# Patient Record
Sex: MALE | Race: WHITE | NOT HISPANIC OR LATINO | Employment: FULL TIME | URBAN - METROPOLITAN AREA
[De-identification: names, ages, dates, MRNs, and addresses within clinical notes are randomized per-mention and may not be internally consistent; named-entity substitution may affect disease eponyms.]

---

## 2017-03-14 ENCOUNTER — OFFICE VISIT - HEALTHEAST (OUTPATIENT)
Dept: FAMILY MEDICINE | Facility: CLINIC | Age: 32
End: 2017-03-14

## 2017-03-14 DIAGNOSIS — J40 BRONCHITIS: ICD-10-CM

## 2017-03-29 ENCOUNTER — RECORDS - HEALTHEAST (OUTPATIENT)
Dept: ADMINISTRATIVE | Facility: OTHER | Age: 32
End: 2017-03-29

## 2017-05-12 ENCOUNTER — RECORDS - HEALTHEAST (OUTPATIENT)
Dept: GENERAL RADIOLOGY | Facility: CLINIC | Age: 32
End: 2017-05-12

## 2017-05-12 ENCOUNTER — OFFICE VISIT - HEALTHEAST (OUTPATIENT)
Dept: FAMILY MEDICINE | Facility: CLINIC | Age: 32
End: 2017-05-12

## 2017-05-12 DIAGNOSIS — M54.12 RADICULOPATHY, CERVICAL REGION: ICD-10-CM

## 2017-05-12 DIAGNOSIS — M54.12 BRACHIAL NEURITIS OR RADICULITIS: ICD-10-CM

## 2017-05-15 ENCOUNTER — COMMUNICATION - HEALTHEAST (OUTPATIENT)
Dept: FAMILY MEDICINE | Facility: CLINIC | Age: 32
End: 2017-05-15

## 2017-05-15 DIAGNOSIS — M54.9 BACK PAIN: ICD-10-CM

## 2017-05-15 DIAGNOSIS — M47.816 OSTEOARTHRITIS OF LUMBAR SPINE: ICD-10-CM

## 2017-05-15 DIAGNOSIS — M47.812 OSTEOARTHRITIS CERVICAL SPINE: ICD-10-CM

## 2017-05-15 DIAGNOSIS — M54.2 NECK PAIN: ICD-10-CM

## 2017-05-18 ENCOUNTER — OFFICE VISIT - HEALTHEAST (OUTPATIENT)
Dept: FAMILY MEDICINE | Facility: CLINIC | Age: 32
End: 2017-05-18

## 2017-05-18 DIAGNOSIS — M54.12 BRACHIAL NEURITIS OR RADICULITIS: ICD-10-CM

## 2017-05-18 DIAGNOSIS — M54.50 LUMBAGO: ICD-10-CM

## 2017-05-18 DIAGNOSIS — M54.16 LUMBAR RADICULOPATHY: ICD-10-CM

## 2017-05-18 ASSESSMENT — MIFFLIN-ST. JEOR: SCORE: 2280.24

## 2017-05-19 ENCOUNTER — HOSPITAL ENCOUNTER (OUTPATIENT)
Dept: PHYSICAL MEDICINE AND REHAB | Facility: CLINIC | Age: 32
Discharge: HOME | End: 2017-05-19
Attending: PHYSICIAN ASSISTANT

## 2017-05-19 ENCOUNTER — HOSPITAL ENCOUNTER (OUTPATIENT)
Dept: PHYSICAL MEDICINE AND REHAB | Facility: CLINIC | Age: 32
Discharge: HOME | End: 2017-05-19
Attending: FAMILY MEDICINE

## 2017-05-19 DIAGNOSIS — M47.812 FACET ARTHRITIS OF CERVICAL REGION: ICD-10-CM

## 2017-05-19 DIAGNOSIS — M79.18 MYOFASCIAL PAIN: ICD-10-CM

## 2017-05-19 DIAGNOSIS — M51.26 LUMBAR DISC HERNIATION: ICD-10-CM

## 2017-05-19 DIAGNOSIS — M54.50 LUMBALGIA: ICD-10-CM

## 2017-05-19 DIAGNOSIS — M47.812 CERVICAL FACET JOINT SYNDROME: ICD-10-CM

## 2017-05-19 DIAGNOSIS — M99.01 CERVICAL SEGMENT DYSFUNCTION: ICD-10-CM

## 2017-05-19 DIAGNOSIS — M54.12 CERVICAL RADICULITIS: ICD-10-CM

## 2017-05-19 DIAGNOSIS — M99.02 THORACIC REGION SOMATIC DYSFUNCTION: ICD-10-CM

## 2017-05-19 DIAGNOSIS — M54.2 CERVICALGIA: ICD-10-CM

## 2017-05-19 DIAGNOSIS — G47.9 SLEEP DISTURBANCE: ICD-10-CM

## 2017-05-19 DIAGNOSIS — M54.16 LUMBAR RADICULITIS: ICD-10-CM

## 2017-05-19 DIAGNOSIS — M99.00 SEGMENTAL AND SOMATIC DYSFUNCTION OF HEAD REGION: ICD-10-CM

## 2017-05-19 ASSESSMENT — MIFFLIN-ST. JEOR: SCORE: 2272.53

## 2017-05-22 ENCOUNTER — RECORDS - HEALTHEAST (OUTPATIENT)
Dept: ADMINISTRATIVE | Facility: OTHER | Age: 32
End: 2017-05-22

## 2017-05-23 ENCOUNTER — HOSPITAL ENCOUNTER (OUTPATIENT)
Dept: MRI IMAGING | Facility: CLINIC | Age: 32
Discharge: HOME | End: 2017-05-23
Attending: FAMILY MEDICINE

## 2017-05-23 ENCOUNTER — HOSPITAL ENCOUNTER (OUTPATIENT)
Dept: PHYSICAL MEDICINE AND REHAB | Facility: CLINIC | Age: 32
Discharge: HOME | End: 2017-05-23
Attending: PHYSICIAN ASSISTANT

## 2017-05-23 DIAGNOSIS — M47.812 CERVICAL FACET JOINT SYNDROME: ICD-10-CM

## 2017-05-23 DIAGNOSIS — M99.01 CERVICAL SEGMENT DYSFUNCTION: ICD-10-CM

## 2017-05-23 DIAGNOSIS — M54.12 BRACHIAL NEURITIS OR RADICULITIS: ICD-10-CM

## 2017-05-23 DIAGNOSIS — M54.16 LUMBAR RADICULOPATHY: ICD-10-CM

## 2017-05-23 DIAGNOSIS — M99.02 THORACIC REGION SOMATIC DYSFUNCTION: ICD-10-CM

## 2017-05-23 DIAGNOSIS — M47.812 FACET ARTHRITIS OF CERVICAL REGION: ICD-10-CM

## 2017-05-23 DIAGNOSIS — M99.00 SEGMENTAL AND SOMATIC DYSFUNCTION OF HEAD REGION: ICD-10-CM

## 2017-05-23 DIAGNOSIS — M54.2 CERVICALGIA: ICD-10-CM

## 2017-05-24 ENCOUNTER — OFFICE VISIT - HEALTHEAST (OUTPATIENT)
Dept: PHYSICAL THERAPY | Facility: CLINIC | Age: 32
End: 2017-05-24

## 2017-05-24 DIAGNOSIS — R29.898 DECREASED ROM OF NECK: ICD-10-CM

## 2017-05-24 DIAGNOSIS — M54.50 CHRONIC BILATERAL LOW BACK PAIN WITHOUT SCIATICA: ICD-10-CM

## 2017-05-24 DIAGNOSIS — M54.12 CERVICAL RADICULITIS: ICD-10-CM

## 2017-05-24 DIAGNOSIS — G89.29 CHRONIC BILATERAL LOW BACK PAIN WITHOUT SCIATICA: ICD-10-CM

## 2017-05-24 DIAGNOSIS — M54.2 NECK PAIN: ICD-10-CM

## 2017-05-24 DIAGNOSIS — M54.16 LUMBAR RADICULOPATHY: ICD-10-CM

## 2017-05-25 ENCOUNTER — HOSPITAL ENCOUNTER (OUTPATIENT)
Dept: PHYSICAL MEDICINE AND REHAB | Facility: CLINIC | Age: 32
Discharge: HOME | End: 2017-05-25
Attending: PHYSICIAN ASSISTANT

## 2017-05-25 DIAGNOSIS — M47.812 CERVICAL FACET JOINT SYNDROME: ICD-10-CM

## 2017-05-25 DIAGNOSIS — M99.02 THORACIC REGION SOMATIC DYSFUNCTION: ICD-10-CM

## 2017-05-25 DIAGNOSIS — M99.01 CERVICAL SEGMENT DYSFUNCTION: ICD-10-CM

## 2017-05-25 DIAGNOSIS — M54.2 CERVICALGIA: ICD-10-CM

## 2017-05-25 DIAGNOSIS — M99.00 SEGMENTAL AND SOMATIC DYSFUNCTION OF HEAD REGION: ICD-10-CM

## 2017-05-30 ENCOUNTER — RECORDS - HEALTHEAST (OUTPATIENT)
Dept: ADMINISTRATIVE | Facility: OTHER | Age: 32
End: 2017-05-30

## 2017-06-02 ENCOUNTER — HOSPITAL ENCOUNTER (OUTPATIENT)
Dept: PHYSICAL MEDICINE AND REHAB | Facility: CLINIC | Age: 32
Discharge: HOME | End: 2017-06-02
Attending: PHYSICIAN ASSISTANT

## 2017-06-02 DIAGNOSIS — M99.01 CERVICAL SEGMENT DYSFUNCTION: ICD-10-CM

## 2017-06-02 DIAGNOSIS — M47.812 CERVICAL FACET JOINT SYNDROME: ICD-10-CM

## 2017-06-02 DIAGNOSIS — M54.2 CERVICALGIA: ICD-10-CM

## 2017-06-02 DIAGNOSIS — M99.00 SEGMENTAL AND SOMATIC DYSFUNCTION OF HEAD REGION: ICD-10-CM

## 2017-06-02 DIAGNOSIS — M99.02 THORACIC REGION SOMATIC DYSFUNCTION: ICD-10-CM

## 2017-06-07 ENCOUNTER — HOSPITAL ENCOUNTER (OUTPATIENT)
Dept: PHYSICAL MEDICINE AND REHAB | Facility: CLINIC | Age: 32
Discharge: HOME | End: 2017-06-07
Attending: PHYSICIAN ASSISTANT

## 2017-06-07 DIAGNOSIS — M79.18 MYOFASCIAL PAIN: ICD-10-CM

## 2017-06-07 DIAGNOSIS — M54.12 CERVICAL RADICULITIS: ICD-10-CM

## 2017-06-07 DIAGNOSIS — M54.50 LUMBALGIA: ICD-10-CM

## 2017-06-07 DIAGNOSIS — G47.9 SLEEP DISTURBANCE: ICD-10-CM

## 2017-06-07 DIAGNOSIS — M54.2 CERVICALGIA: ICD-10-CM

## 2017-06-07 DIAGNOSIS — M54.16 LUMBAR RADICULITIS: ICD-10-CM

## 2017-06-07 DIAGNOSIS — M47.812 CERVICAL FACET JOINT SYNDROME: ICD-10-CM

## 2017-06-07 DIAGNOSIS — M51.26 LUMBAR DISC HERNIATION: ICD-10-CM

## 2017-06-12 ENCOUNTER — OFFICE VISIT - HEALTHEAST (OUTPATIENT)
Dept: PHYSICAL THERAPY | Facility: CLINIC | Age: 32
End: 2017-06-12

## 2017-06-12 DIAGNOSIS — M54.16 LUMBAR RADICULOPATHY: ICD-10-CM

## 2017-06-12 DIAGNOSIS — M54.12 CERVICAL RADICULITIS: ICD-10-CM

## 2017-06-12 DIAGNOSIS — G89.29 CHRONIC BILATERAL LOW BACK PAIN WITHOUT SCIATICA: ICD-10-CM

## 2017-06-12 DIAGNOSIS — R29.898 DECREASED ROM OF NECK: ICD-10-CM

## 2017-06-12 DIAGNOSIS — M54.2 NECK PAIN: ICD-10-CM

## 2017-06-12 DIAGNOSIS — M54.50 CHRONIC BILATERAL LOW BACK PAIN WITHOUT SCIATICA: ICD-10-CM

## 2017-06-15 ENCOUNTER — OFFICE VISIT - HEALTHEAST (OUTPATIENT)
Dept: PHYSICAL THERAPY | Facility: CLINIC | Age: 32
End: 2017-06-15

## 2017-06-15 DIAGNOSIS — M54.16 LUMBAR RADICULOPATHY: ICD-10-CM

## 2017-06-15 DIAGNOSIS — R29.898 DECREASED ROM OF NECK: ICD-10-CM

## 2017-06-15 DIAGNOSIS — M54.12 CERVICAL RADICULITIS: ICD-10-CM

## 2017-06-15 DIAGNOSIS — G89.29 CHRONIC BILATERAL LOW BACK PAIN WITHOUT SCIATICA: ICD-10-CM

## 2017-06-15 DIAGNOSIS — M54.2 NECK PAIN: ICD-10-CM

## 2017-06-15 DIAGNOSIS — M54.50 CHRONIC BILATERAL LOW BACK PAIN WITHOUT SCIATICA: ICD-10-CM

## 2017-06-19 ENCOUNTER — HOSPITAL ENCOUNTER (OUTPATIENT)
Dept: PHYSICAL MEDICINE AND REHAB | Facility: CLINIC | Age: 32
Discharge: HOME | End: 2017-06-19
Attending: PHYSICIAN ASSISTANT

## 2017-06-19 ENCOUNTER — OFFICE VISIT - HEALTHEAST (OUTPATIENT)
Dept: PHYSICAL THERAPY | Facility: CLINIC | Age: 32
End: 2017-06-19

## 2017-06-19 DIAGNOSIS — M99.04 SACROILIAC JOINT SOMATIC DYSFUNCTION: ICD-10-CM

## 2017-06-19 DIAGNOSIS — R29.898 DECREASED ROM OF NECK: ICD-10-CM

## 2017-06-19 DIAGNOSIS — M54.50 CHRONIC BILATERAL LOW BACK PAIN WITHOUT SCIATICA: ICD-10-CM

## 2017-06-19 DIAGNOSIS — M99.00 SEGMENTAL AND SOMATIC DYSFUNCTION OF HEAD REGION: ICD-10-CM

## 2017-06-19 DIAGNOSIS — M54.2 CERVICALGIA: ICD-10-CM

## 2017-06-19 DIAGNOSIS — M47.812 CERVICAL FACET JOINT SYNDROME: ICD-10-CM

## 2017-06-19 DIAGNOSIS — M99.04 SACRAL REGION SOMATIC DYSFUNCTION: ICD-10-CM

## 2017-06-19 DIAGNOSIS — G89.29 CHRONIC BILATERAL LOW BACK PAIN WITHOUT SCIATICA: ICD-10-CM

## 2017-06-19 DIAGNOSIS — M54.16 LUMBAR RADICULOPATHY: ICD-10-CM

## 2017-06-19 DIAGNOSIS — M99.01 CERVICAL SEGMENT DYSFUNCTION: ICD-10-CM

## 2017-06-19 DIAGNOSIS — M54.2 NECK PAIN: ICD-10-CM

## 2017-06-19 DIAGNOSIS — M99.03 SEGMENTAL DYSFUNCTION OF LUMBAR REGION: ICD-10-CM

## 2017-06-19 DIAGNOSIS — M99.02 THORACIC REGION SOMATIC DYSFUNCTION: ICD-10-CM

## 2017-06-19 DIAGNOSIS — M54.12 CERVICAL RADICULITIS: ICD-10-CM

## 2017-06-20 ENCOUNTER — HOSPITAL ENCOUNTER (OUTPATIENT)
Dept: PHYSICAL MEDICINE AND REHAB | Facility: CLINIC | Age: 32
Discharge: HOME | End: 2017-06-20
Attending: PHYSICIAN ASSISTANT

## 2017-06-20 DIAGNOSIS — G47.9 SLEEP DISTURBANCE: ICD-10-CM

## 2017-06-20 DIAGNOSIS — M51.26 LUMBAR DISC HERNIATION: ICD-10-CM

## 2017-06-20 DIAGNOSIS — M54.16 LUMBAR RADICULITIS: ICD-10-CM

## 2017-06-20 DIAGNOSIS — M54.50 LUMBALGIA: ICD-10-CM

## 2017-06-21 ENCOUNTER — OFFICE VISIT - HEALTHEAST (OUTPATIENT)
Dept: PHYSICAL THERAPY | Facility: CLINIC | Age: 32
End: 2017-06-21

## 2017-06-21 ENCOUNTER — HOSPITAL ENCOUNTER (OUTPATIENT)
Dept: PHYSICAL MEDICINE AND REHAB | Facility: CLINIC | Age: 32
Discharge: HOME | End: 2017-06-21
Attending: PHYSICIAN ASSISTANT

## 2017-06-21 DIAGNOSIS — G89.29 CHRONIC BILATERAL LOW BACK PAIN WITHOUT SCIATICA: ICD-10-CM

## 2017-06-21 DIAGNOSIS — M54.2 NECK PAIN: ICD-10-CM

## 2017-06-21 DIAGNOSIS — M54.12 CERVICAL RADICULITIS: ICD-10-CM

## 2017-06-21 DIAGNOSIS — M54.16 LUMBAR RADICULOPATHY: ICD-10-CM

## 2017-06-21 DIAGNOSIS — R29.898 DECREASED ROM OF NECK: ICD-10-CM

## 2017-06-21 DIAGNOSIS — M54.50 CHRONIC BILATERAL LOW BACK PAIN WITHOUT SCIATICA: ICD-10-CM

## 2017-06-26 ENCOUNTER — OFFICE VISIT - HEALTHEAST (OUTPATIENT)
Dept: PHYSICAL THERAPY | Facility: CLINIC | Age: 32
End: 2017-06-26

## 2017-06-26 ENCOUNTER — HOSPITAL ENCOUNTER (OUTPATIENT)
Dept: PHYSICAL MEDICINE AND REHAB | Facility: CLINIC | Age: 32
Discharge: HOME | End: 2017-06-26
Attending: PHYSICIAN ASSISTANT

## 2017-06-26 DIAGNOSIS — M79.18 MYOFASCIAL PAIN: ICD-10-CM

## 2017-06-26 DIAGNOSIS — M99.03 SEGMENTAL DYSFUNCTION OF LUMBAR REGION: ICD-10-CM

## 2017-06-26 DIAGNOSIS — M47.812 FACET ARTHRITIS OF CERVICAL REGION: ICD-10-CM

## 2017-06-26 DIAGNOSIS — M99.00 SEGMENTAL AND SOMATIC DYSFUNCTION OF HEAD REGION: ICD-10-CM

## 2017-06-26 DIAGNOSIS — M99.01 CERVICAL SEGMENT DYSFUNCTION: ICD-10-CM

## 2017-06-26 DIAGNOSIS — M54.2 NECK PAIN: ICD-10-CM

## 2017-06-26 DIAGNOSIS — M54.50 CHRONIC BILATERAL LOW BACK PAIN WITHOUT SCIATICA: ICD-10-CM

## 2017-06-26 DIAGNOSIS — M54.12 CERVICAL RADICULITIS: ICD-10-CM

## 2017-06-26 DIAGNOSIS — G89.29 CHRONIC BILATERAL LOW BACK PAIN WITHOUT SCIATICA: ICD-10-CM

## 2017-06-26 DIAGNOSIS — M99.02 THORACIC REGION SOMATIC DYSFUNCTION: ICD-10-CM

## 2017-06-26 DIAGNOSIS — M99.04 SACROILIAC JOINT SOMATIC DYSFUNCTION: ICD-10-CM

## 2017-06-26 DIAGNOSIS — M99.04 SACRAL REGION SOMATIC DYSFUNCTION: ICD-10-CM

## 2017-06-26 DIAGNOSIS — M54.16 LUMBAR RADICULOPATHY: ICD-10-CM

## 2017-06-26 DIAGNOSIS — R29.898 DECREASED ROM OF NECK: ICD-10-CM

## 2017-06-26 ASSESSMENT — MIFFLIN-ST. JEOR: SCORE: 2266.63

## 2017-06-28 ENCOUNTER — RECORDS - HEALTHEAST (OUTPATIENT)
Dept: ADMINISTRATIVE | Facility: OTHER | Age: 32
End: 2017-06-28

## 2017-06-29 ENCOUNTER — HOSPITAL ENCOUNTER (OUTPATIENT)
Dept: PHYSICAL MEDICINE AND REHAB | Facility: CLINIC | Age: 32
Discharge: HOME | End: 2017-06-29
Attending: PHYSICIAN ASSISTANT

## 2017-06-29 ENCOUNTER — OFFICE VISIT - HEALTHEAST (OUTPATIENT)
Dept: PHYSICAL THERAPY | Facility: CLINIC | Age: 32
End: 2017-06-29

## 2017-06-29 DIAGNOSIS — M54.16 LUMBAR RADICULOPATHY: ICD-10-CM

## 2017-06-29 DIAGNOSIS — M54.12 CERVICAL RADICULITIS: ICD-10-CM

## 2017-06-29 DIAGNOSIS — G89.29 CHRONIC BILATERAL LOW BACK PAIN WITHOUT SCIATICA: ICD-10-CM

## 2017-06-29 DIAGNOSIS — M99.02 THORACIC REGION SOMATIC DYSFUNCTION: ICD-10-CM

## 2017-06-29 DIAGNOSIS — M54.2 NECK PAIN: ICD-10-CM

## 2017-06-29 DIAGNOSIS — M54.50 CHRONIC BILATERAL LOW BACK PAIN WITHOUT SCIATICA: ICD-10-CM

## 2017-06-29 DIAGNOSIS — M99.04 SACRAL REGION SOMATIC DYSFUNCTION: ICD-10-CM

## 2017-06-29 DIAGNOSIS — M99.00 SEGMENTAL AND SOMATIC DYSFUNCTION OF HEAD REGION: ICD-10-CM

## 2017-06-29 DIAGNOSIS — M79.18 MYOFASCIAL PAIN: ICD-10-CM

## 2017-06-29 DIAGNOSIS — M54.2 CERVICALGIA: ICD-10-CM

## 2017-06-29 DIAGNOSIS — M99.04 SACROILIAC JOINT SOMATIC DYSFUNCTION: ICD-10-CM

## 2017-06-29 DIAGNOSIS — M99.01 CERVICAL SEGMENT DYSFUNCTION: ICD-10-CM

## 2017-06-29 DIAGNOSIS — R29.898 DECREASED ROM OF NECK: ICD-10-CM

## 2017-06-29 DIAGNOSIS — M99.03 SEGMENTAL DYSFUNCTION OF LUMBAR REGION: ICD-10-CM

## 2017-07-06 ENCOUNTER — OFFICE VISIT - HEALTHEAST (OUTPATIENT)
Dept: PHYSICAL THERAPY | Facility: CLINIC | Age: 32
End: 2017-07-06

## 2017-07-06 DIAGNOSIS — M54.50 CHRONIC BILATERAL LOW BACK PAIN WITHOUT SCIATICA: ICD-10-CM

## 2017-07-06 DIAGNOSIS — M54.12 CERVICAL RADICULITIS: ICD-10-CM

## 2017-07-06 DIAGNOSIS — M54.16 LUMBAR RADICULOPATHY: ICD-10-CM

## 2017-07-06 DIAGNOSIS — M54.2 NECK PAIN: ICD-10-CM

## 2017-07-06 DIAGNOSIS — R29.898 DECREASED ROM OF NECK: ICD-10-CM

## 2017-07-06 DIAGNOSIS — G89.29 CHRONIC BILATERAL LOW BACK PAIN WITHOUT SCIATICA: ICD-10-CM

## 2017-07-13 ENCOUNTER — HOSPITAL ENCOUNTER (OUTPATIENT)
Dept: PHYSICAL MEDICINE AND REHAB | Facility: CLINIC | Age: 32
Discharge: HOME | End: 2017-07-13
Attending: PHYSICIAN ASSISTANT

## 2017-07-13 ENCOUNTER — OFFICE VISIT - HEALTHEAST (OUTPATIENT)
Dept: PHYSICAL THERAPY | Facility: CLINIC | Age: 32
End: 2017-07-13

## 2017-07-13 DIAGNOSIS — M99.01 CERVICAL SEGMENT DYSFUNCTION: ICD-10-CM

## 2017-07-13 DIAGNOSIS — M79.18 MYOFASCIAL PAIN: ICD-10-CM

## 2017-07-13 DIAGNOSIS — G89.29 CHRONIC BILATERAL LOW BACK PAIN WITHOUT SCIATICA: ICD-10-CM

## 2017-07-13 DIAGNOSIS — R29.898 DECREASED ROM OF NECK: ICD-10-CM

## 2017-07-13 DIAGNOSIS — M54.12 CERVICAL RADICULITIS: ICD-10-CM

## 2017-07-13 DIAGNOSIS — M54.50 CHRONIC BILATERAL LOW BACK PAIN WITHOUT SCIATICA: ICD-10-CM

## 2017-07-13 DIAGNOSIS — M99.00 SEGMENTAL AND SOMATIC DYSFUNCTION OF HEAD REGION: ICD-10-CM

## 2017-07-13 DIAGNOSIS — M54.16 LUMBAR RADICULOPATHY: ICD-10-CM

## 2017-07-13 DIAGNOSIS — M99.03 SEGMENTAL DYSFUNCTION OF LUMBAR REGION: ICD-10-CM

## 2017-07-13 DIAGNOSIS — M54.2 CERVICALGIA: ICD-10-CM

## 2017-07-13 DIAGNOSIS — M99.04 SACROILIAC JOINT SOMATIC DYSFUNCTION: ICD-10-CM

## 2017-07-13 DIAGNOSIS — M54.2 NECK PAIN: ICD-10-CM

## 2017-07-13 DIAGNOSIS — M99.02 THORACIC REGION SOMATIC DYSFUNCTION: ICD-10-CM

## 2017-07-19 ENCOUNTER — HOSPITAL ENCOUNTER (OUTPATIENT)
Dept: PHYSICAL MEDICINE AND REHAB | Facility: CLINIC | Age: 32
Discharge: HOME | End: 2017-07-19
Attending: PHYSICIAN ASSISTANT

## 2017-07-19 DIAGNOSIS — M99.01 CERVICAL SEGMENT DYSFUNCTION: ICD-10-CM

## 2017-07-19 DIAGNOSIS — M54.50 CHRONIC BILATERAL LOW BACK PAIN WITHOUT SCIATICA: ICD-10-CM

## 2017-07-19 DIAGNOSIS — M99.02 THORACIC REGION SOMATIC DYSFUNCTION: ICD-10-CM

## 2017-07-19 DIAGNOSIS — M54.2 CERVICALGIA: ICD-10-CM

## 2017-07-19 DIAGNOSIS — M79.18 MYOFASCIAL PAIN: ICD-10-CM

## 2017-07-19 DIAGNOSIS — M99.03 SEGMENTAL DYSFUNCTION OF LUMBAR REGION: ICD-10-CM

## 2017-07-19 DIAGNOSIS — G89.29 CHRONIC BILATERAL LOW BACK PAIN WITHOUT SCIATICA: ICD-10-CM

## 2017-07-19 DIAGNOSIS — M99.04 SACROILIAC JOINT SOMATIC DYSFUNCTION: ICD-10-CM

## 2017-07-19 DIAGNOSIS — M99.04 SACRAL REGION SOMATIC DYSFUNCTION: ICD-10-CM

## 2017-07-21 ENCOUNTER — RECORDS - HEALTHEAST (OUTPATIENT)
Dept: ADMINISTRATIVE | Facility: OTHER | Age: 32
End: 2017-07-21

## 2017-07-26 ENCOUNTER — OFFICE VISIT - HEALTHEAST (OUTPATIENT)
Dept: PHYSICAL THERAPY | Facility: CLINIC | Age: 32
End: 2017-07-26

## 2017-07-26 DIAGNOSIS — M54.12 CERVICAL RADICULITIS: ICD-10-CM

## 2017-07-26 DIAGNOSIS — M54.50 CHRONIC BILATERAL LOW BACK PAIN WITHOUT SCIATICA: ICD-10-CM

## 2017-07-26 DIAGNOSIS — M54.2 NECK PAIN: ICD-10-CM

## 2017-07-26 DIAGNOSIS — R29.898 DECREASED ROM OF NECK: ICD-10-CM

## 2017-07-26 DIAGNOSIS — G89.29 CHRONIC BILATERAL LOW BACK PAIN WITHOUT SCIATICA: ICD-10-CM

## 2017-07-26 DIAGNOSIS — M54.16 LUMBAR RADICULOPATHY: ICD-10-CM

## 2017-08-24 ENCOUNTER — RECORDS - HEALTHEAST (OUTPATIENT)
Dept: ADMINISTRATIVE | Facility: OTHER | Age: 32
End: 2017-08-24

## 2017-10-25 ENCOUNTER — RECORDS - HEALTHEAST (OUTPATIENT)
Dept: ADMINISTRATIVE | Facility: OTHER | Age: 32
End: 2017-10-25

## 2017-10-27 ENCOUNTER — RECORDS - HEALTHEAST (OUTPATIENT)
Dept: ADMINISTRATIVE | Facility: OTHER | Age: 32
End: 2017-10-27

## 2018-04-13 ENCOUNTER — RECORDS - HEALTHEAST (OUTPATIENT)
Dept: ADMINISTRATIVE | Facility: OTHER | Age: 33
End: 2018-04-13

## 2018-04-17 ENCOUNTER — RECORDS - HEALTHEAST (OUTPATIENT)
Dept: ADMINISTRATIVE | Facility: OTHER | Age: 33
End: 2018-04-17

## 2018-04-25 ENCOUNTER — HOSPITAL ENCOUNTER (OUTPATIENT)
Dept: MRI IMAGING | Facility: CLINIC | Age: 33
Discharge: HOME | End: 2018-04-25
Attending: INTERNAL MEDICINE

## 2018-04-25 DIAGNOSIS — K50.813 CROHN'S DISEASE OF BOTH SMALL AND LARGE INTESTINE WITH FISTULA (H): ICD-10-CM

## 2018-05-02 ENCOUNTER — RECORDS - HEALTHEAST (OUTPATIENT)
Dept: ADMINISTRATIVE | Facility: OTHER | Age: 33
End: 2018-05-02

## 2018-05-04 ENCOUNTER — RECORDS - HEALTHEAST (OUTPATIENT)
Dept: ADMINISTRATIVE | Facility: OTHER | Age: 33
End: 2018-05-04

## 2018-06-20 ENCOUNTER — COMMUNICATION - HEALTHEAST (OUTPATIENT)
Dept: FAMILY MEDICINE | Facility: CLINIC | Age: 33
End: 2018-06-20

## 2018-08-08 DIAGNOSIS — Z31.41 ENCOUNTER FOR SPERM COUNT FOR FERTILITY TESTING: Primary | ICD-10-CM

## 2018-08-08 LAB
ABNORMAL SPERM: 93 MORPHOLOGY
ABSTINENCE DAYS: 3 DAYS (ref 2–7)
AGGLUTINATION: YES YES/NO
ANALYSIS TEMP - CENTIGRADE: 22 CENTIGRADE
CELL FRAGMENTS: NORMAL %
COLLECTION METHOD: NORMAL
COLLECTION SITE: NORMAL
CONSENT TO RELEASE TO PARTNER: YES
HEAD DEFECT: 93
IMMATURE SPERM: NORMAL %
IMMOTILE: 29 %
LAB RECEIPT TIME: NORMAL
LIQUEFIED: YES YES/NO
MIDPIECE DEFECT: 34
NON-PROGRESSIVE MOTILITY: 7 %
NORMAL SPERM: 7 % NORMAL FORMS (ref 4–?)
PROGRESSIVE MOTILITY: 64 % (ref 32–?)
ROUND CELLS: <0.1 MILLION/ML (ref ?–2)
SPECIMEN CONCENTRATION: 336 MILLION/ML (ref 15–?)
SPECIMEN PH: 7.6 PH (ref 7.2–?)
SPECIMEN TYPE: NORMAL
SPECIMEN VOL UR: 2.2 ML (ref 1.5–?)
TAIL DEFECT: 20
TIME OF ANALYSIS: NORMAL
TOTAL NUMBER: 739 MILLION (ref 39–?)
TOTAL PROGRESSIVE MOTILE: 473 MILLION (ref 15.6–?)
VISCOUS: NO YES/NO
VITALITY: NORMAL % (ref 58–?)
WBC SPECIMEN: NORMAL %

## 2018-08-08 PROCEDURE — 89322 SEMEN ANAL STRICT CRITERIA: CPT

## 2018-09-26 ENCOUNTER — RECORDS - HEALTHEAST (OUTPATIENT)
Dept: ADMINISTRATIVE | Facility: OTHER | Age: 33
End: 2018-09-26

## 2018-10-04 ENCOUNTER — RECORDS - HEALTHEAST (OUTPATIENT)
Dept: ADMINISTRATIVE | Facility: OTHER | Age: 33
End: 2018-10-04

## 2018-10-11 ENCOUNTER — RECORDS - HEALTHEAST (OUTPATIENT)
Dept: ADMINISTRATIVE | Facility: OTHER | Age: 33
End: 2018-10-11

## 2018-10-24 ENCOUNTER — COMMUNICATION - HEALTHEAST (OUTPATIENT)
Dept: CARE COORDINATION | Facility: CLINIC | Age: 33
End: 2018-10-24

## 2018-10-25 ENCOUNTER — COMMUNICATION - HEALTHEAST (OUTPATIENT)
Dept: FAMILY MEDICINE | Facility: CLINIC | Age: 33
End: 2018-10-25

## 2018-10-29 ENCOUNTER — OFFICE VISIT - HEALTHEAST (OUTPATIENT)
Dept: FAMILY MEDICINE | Facility: CLINIC | Age: 33
End: 2018-10-29

## 2018-10-29 DIAGNOSIS — K50.10 CROHN'S COLITIS (H): ICD-10-CM

## 2018-10-29 DIAGNOSIS — Z71.84 COUNSELING ABOUT TRAVEL: ICD-10-CM

## 2018-10-29 DIAGNOSIS — R10.9 ABDOMINAL PAIN: ICD-10-CM

## 2018-12-07 ENCOUNTER — RECORDS - HEALTHEAST (OUTPATIENT)
Dept: ADMINISTRATIVE | Facility: OTHER | Age: 33
End: 2018-12-07

## 2018-12-11 ENCOUNTER — RECORDS - HEALTHEAST (OUTPATIENT)
Dept: ADMINISTRATIVE | Facility: OTHER | Age: 33
End: 2018-12-11

## 2018-12-20 ENCOUNTER — RECORDS - HEALTHEAST (OUTPATIENT)
Dept: ADMINISTRATIVE | Facility: OTHER | Age: 33
End: 2018-12-20

## 2018-12-30 ENCOUNTER — RECORDS - HEALTHEAST (OUTPATIENT)
Dept: ADMINISTRATIVE | Facility: OTHER | Age: 33
End: 2018-12-30

## 2019-02-04 ENCOUNTER — OFFICE VISIT (OUTPATIENT)
Dept: URGENT CARE | Facility: URGENT CARE | Age: 34
End: 2019-02-04
Payer: COMMERCIAL

## 2019-02-04 ENCOUNTER — ANCILLARY PROCEDURE (OUTPATIENT)
Dept: GENERAL RADIOLOGY | Facility: CLINIC | Age: 34
End: 2019-02-04
Payer: COMMERCIAL

## 2019-02-04 VITALS
DIASTOLIC BLOOD PRESSURE: 102 MMHG | SYSTOLIC BLOOD PRESSURE: 146 MMHG | OXYGEN SATURATION: 99 % | TEMPERATURE: 98.3 F | HEART RATE: 82 BPM

## 2019-02-04 DIAGNOSIS — S60.021A CONTUSION OF RIGHT INDEX FINGER WITHOUT DAMAGE TO NAIL, INITIAL ENCOUNTER: Primary | ICD-10-CM

## 2019-02-04 DIAGNOSIS — M79.644 PAIN OF FINGER OF RIGHT HAND: ICD-10-CM

## 2019-02-04 PROCEDURE — 99203 OFFICE O/P NEW LOW 30 MIN: CPT | Performed by: PHYSICIAN ASSISTANT

## 2019-02-04 PROCEDURE — 73130 X-RAY EXAM OF HAND: CPT | Mod: RT

## 2019-02-04 RX ORDER — USTEKINUMAB 90 MG/ML
INJECTION, SOLUTION SUBCUTANEOUS
COMMUNITY
Start: 2019-01-29 | End: 2021-12-14

## 2019-02-04 RX ORDER — IBUPROFEN 200 MG
500 CAPSULE ORAL
COMMUNITY
Start: 2018-10-22 | End: 2024-01-10

## 2019-02-04 NOTE — PATIENT INSTRUCTIONS
Patient Education     Finger Contusion  You have a contusion. This is also called a bruise. There is swelling and some bleeding under the skin, but no broken bones. This injury generally takes a few days to a few weeks to heal. During that time, the bruise will typically change in color from reddish, to purple-blue, to greenish-yellow, then to yellow-brown.  A finger contusion may be treated with a splint or adalberto tape (taping the injured finger to the one next to it for support). Minor contusions likely will need no other treatment.  Home care    Elevate the hand to reduce pain and swelling. As much as possible, sit or lie down with the hand raised about the level of your heart. This is especially important during the first 48 hours.    Ice the finger to help reduce pain and swelling. Wrap a cold source (ice pack or ice cubes in a plastic bag) in a thin towel. Apply to the bruised finger for 20 minutes every 1 to 2 hours the first day. Continue this 3 to 4 times a day until the pain and swelling goes away.    If adalberto tape was applied and it becomes wet or dirty, change it. You may replace it with paper, plastic, or cloth tape. Before taping, put a thin strip of cotton or gauze between the fingers to absorb sweat. This will help prevent any breakdown of skin or fungal infections.     Unless another medicine was prescribed, you can take acetaminophen, ibuprofen, or naproxen to control pain. (If you have chronic liver or kidney disease or ever had a stomach ulcer or gastrointestinal bleeding, talk with your doctor before using these medicines.)  Follow up  Follow up with your healthcare provider, or as advised. Call if you are not improving within 1 to 2 weeks.  When to seek medical advice   Call your healthcare provider right away if you have any of the following:    Increased pain or swelling    Hand or arm becomes cold, blue, numb or tingly    Signs of infection: Warmth, drainage, or increased redness or pain  around the bruise    Inability to move the injured finger or hand     Frequent bruising for unknown reasons  Date Last Reviewed: 5/1/2017 2000-2018 [employer]. [address] [place] [address], [place] [address]. All rights reserved. This information is not intended as a substitute for professional medical care. Always follow your healthcare professional's instructions.

## 2019-02-04 NOTE — NURSING NOTE
Chief Complaint   Patient presents with     Urgent Care     Fall     right index finger and wrist injury. Patient did hit his head.        ATTILA GUERRA CMA

## 2019-02-04 NOTE — PROGRESS NOTES
SUBJECTIVE:  Chief Complaint   Patient presents with     Urgent Care     Fall     right index finger and wrist injury. Patient did hit his head.     Jose Zuniga is a 33 year old male who presents with a chief complaint of right first finger pain.  Symptoms began last night. Patient slipped on the ice and caught himself and landed on his right hand. He noted over today his finger has become more swollen. He does note some pain in his wrist.   Pain exacerbated by movement Relieved by rest.  He treated it initially with Tylenol.    Past Medical History:   Diagnosis Date     NO ACTIVE PROBLEMS      Regional enteritis (Crohn's disease) (H)      Current Outpatient Medications   Medication Sig Dispense Refill     calcium carbonate 500 mg, elemental, (OSCAL 500) 1250 (500 Ca) MG TABS tablet Take 500 mg by mouth       STELARA 90 MG/ML        Social History     Tobacco Use     Smoking status: Never Smoker     Smokeless tobacco: Current User     Types: Chew   Substance Use Topics     Alcohol use: Yes     Comment: has a drink once a month       ROS:  Review of systems negative except as stated above.      EXAM:   BP (!) 146/102 (Cuff Size: Adult Large)   Pulse 82   Temp 98.3  F (36.8  C)   SpO2 99%   M/S Exam:right pointer finger has erythema, swelling and decreased ROM.   GENERAL APPEARANCE: healthy, alert and no distress  EXTREMITIES: peripheral pulses normal  SKIN: no suspicious lesions or rashes  NEURO: Normal strength and tone, sensory exam grossly normal, mentation intact and speech normal    X-RAY was done -- NO fracture noted. Pending radiology report    ASSESSMENT / PLAN:  1. Contusion of right index finger without damage to nail, initial encounter  No fracture noted on XR.   Patient was placed in finger splint for compression, encouraged RICE treatment  Cannot take IBU 2/2 Chron's, Tylenol indicated for pain  Follow-up with ortho if no improvement or finger becomes cold or icy     I have discussed the patient's  diagnosis and my plan of treatment with the patient. We went over any labs or imaging. Patient is aware to come back in with worsening symptoms or if no relief despite treatment plan.  Patient verbalizes understanding. All questions were addressed and answered.   Wendy Roche PA-C

## 2019-03-22 ENCOUNTER — RECORDS - HEALTHEAST (OUTPATIENT)
Dept: ADMINISTRATIVE | Facility: OTHER | Age: 34
End: 2019-03-22

## 2019-03-27 ENCOUNTER — RECORDS - HEALTHEAST (OUTPATIENT)
Dept: ADMINISTRATIVE | Facility: OTHER | Age: 34
End: 2019-03-27

## 2019-04-01 ENCOUNTER — HOSPITAL ENCOUNTER (OUTPATIENT)
Dept: BONE DENSITY | Facility: CLINIC | Age: 34
Discharge: HOME | End: 2019-04-01
Attending: INTERNAL MEDICINE | Admitting: INTERNAL MEDICINE
Payer: COMMERCIAL

## 2019-04-01 DIAGNOSIS — Z71.3 DIETARY COUNSELING AND SURVEILLANCE: ICD-10-CM

## 2019-04-01 DIAGNOSIS — I10 ESSENTIAL (PRIMARY) HYPERTENSION: ICD-10-CM

## 2019-04-01 DIAGNOSIS — K50.812 CROHN'S DISEASE OF BOTH SMALL AND LARGE INTESTINE WITH INTESTINAL OBSTRUCTION (H): ICD-10-CM

## 2019-04-01 PROCEDURE — 77080 DXA BONE DENSITY AXIAL: CPT

## 2019-04-11 ENCOUNTER — RECORDS - HEALTHEAST (OUTPATIENT)
Dept: ADMINISTRATIVE | Facility: OTHER | Age: 34
End: 2019-04-11

## 2019-04-22 ENCOUNTER — OFFICE VISIT - HEALTHEAST (OUTPATIENT)
Dept: FAMILY MEDICINE | Facility: CLINIC | Age: 34
End: 2019-04-22

## 2019-04-22 DIAGNOSIS — Z72.0 TOBACCO ABUSE: ICD-10-CM

## 2019-04-22 DIAGNOSIS — Z23 NEED FOR HEPATITIS VACCINATION: ICD-10-CM

## 2019-04-22 DIAGNOSIS — K50.118 CROHN'S DISEASE OF COLON WITH OTHER COMPLICATION (H): ICD-10-CM

## 2019-04-22 DIAGNOSIS — Z82.49 FAMILY HISTORY OF PULMONARY EMBOLISM: ICD-10-CM

## 2019-04-22 DIAGNOSIS — Z01.810 PREOP CARDIOVASCULAR EXAM: ICD-10-CM

## 2019-04-22 LAB
ALBUMIN SERPL-MCNC: 3.1 G/DL (ref 3.5–5)
ALP SERPL-CCNC: 74 U/L (ref 45–120)
ALT SERPL W P-5'-P-CCNC: 13 U/L (ref 0–45)
ANION GAP SERPL CALCULATED.3IONS-SCNC: 6 MMOL/L (ref 5–18)
AST SERPL W P-5'-P-CCNC: 11 U/L (ref 0–40)
ATRIAL RATE - MUSE: 73 BPM
BILIRUB DIRECT SERPL-MCNC: 0.2 MG/DL
BILIRUB SERPL-MCNC: 0.4 MG/DL (ref 0–1)
BUN SERPL-MCNC: 12 MG/DL (ref 8–22)
CALCIUM SERPL-MCNC: 8.6 MG/DL (ref 8.5–10.5)
CHLORIDE BLD-SCNC: 111 MMOL/L (ref 98–107)
CO2 SERPL-SCNC: 24 MMOL/L (ref 22–31)
CREAT SERPL-MCNC: 0.83 MG/DL (ref 0.7–1.3)
DIASTOLIC BLOOD PRESSURE - MUSE: NORMAL MMHG
ERYTHROCYTE [DISTWIDTH] IN BLOOD BY AUTOMATED COUNT: 12.9 % (ref 11–14.5)
GFR SERPL CREATININE-BSD FRML MDRD: >60 ML/MIN/1.73M2
GLUCOSE BLD-MCNC: 79 MG/DL (ref 70–125)
HCT VFR BLD AUTO: 40.2 % (ref 40–54)
HGB BLD-MCNC: 13.2 G/DL (ref 14–18)
INTERPRETATION ECG - MUSE: NORMAL
MCH RBC QN AUTO: 29 PG (ref 27–34)
MCHC RBC AUTO-ENTMCNC: 32.8 G/DL (ref 32–36)
MCV RBC AUTO: 88 FL (ref 80–100)
P AXIS - MUSE: 14 DEGREES
PLATELET # BLD AUTO: 415 THOU/UL (ref 140–440)
PMV BLD AUTO: 7.3 FL (ref 7–10)
POTASSIUM BLD-SCNC: 4 MMOL/L (ref 3.5–5)
PR INTERVAL - MUSE: 156 MS
PROT SERPL-MCNC: 5.6 G/DL (ref 6–8)
QRS DURATION - MUSE: 94 MS
QT - MUSE: 392 MS
QTC - MUSE: 431 MS
R AXIS - MUSE: 7 DEGREES
RBC # BLD AUTO: 4.55 MILL/UL (ref 4.4–6.2)
SODIUM SERPL-SCNC: 141 MMOL/L (ref 136–145)
SYSTOLIC BLOOD PRESSURE - MUSE: NORMAL MMHG
T AXIS - MUSE: 0 DEGREES
VENTRICULAR RATE- MUSE: 73 BPM
WBC: 5.5 THOU/UL (ref 4–11)

## 2019-04-22 ASSESSMENT — MIFFLIN-ST. JEOR: SCORE: 2382.29

## 2019-05-08 ENCOUNTER — RECORDS - HEALTHEAST (OUTPATIENT)
Dept: ADMINISTRATIVE | Facility: OTHER | Age: 34
End: 2019-05-08

## 2019-06-06 ENCOUNTER — RECORDS - HEALTHEAST (OUTPATIENT)
Dept: ADMINISTRATIVE | Facility: OTHER | Age: 34
End: 2019-06-06

## 2019-08-01 ENCOUNTER — RECORDS - HEALTHEAST (OUTPATIENT)
Dept: ADMINISTRATIVE | Facility: OTHER | Age: 34
End: 2019-08-01

## 2019-08-19 ENCOUNTER — COMMUNICATION - HEALTHEAST (OUTPATIENT)
Dept: FAMILY MEDICINE | Facility: CLINIC | Age: 34
End: 2019-08-19

## 2019-08-22 ENCOUNTER — OFFICE VISIT - HEALTHEAST (OUTPATIENT)
Dept: FAMILY MEDICINE | Facility: CLINIC | Age: 34
End: 2019-08-22

## 2019-08-22 DIAGNOSIS — Z82.49 FAMILY HISTORY OF PULMONARY EMBOLISM: ICD-10-CM

## 2019-08-22 DIAGNOSIS — R03.0 ELEVATED BP WITHOUT DIAGNOSIS OF HYPERTENSION: ICD-10-CM

## 2019-08-22 LAB
ALBUMIN UR-MCNC: ABNORMAL MG/DL
ANION GAP SERPL CALCULATED.3IONS-SCNC: 7 MMOL/L (ref 5–18)
APPEARANCE UR: CLEAR
BACTERIA #/AREA URNS HPF: ABNORMAL HPF
BILIRUB UR QL STRIP: NEGATIVE
BUN SERPL-MCNC: 12 MG/DL (ref 8–22)
CALCIUM SERPL-MCNC: 9.6 MG/DL (ref 8.5–10.5)
CHLORIDE BLD-SCNC: 108 MMOL/L (ref 98–107)
CO2 SERPL-SCNC: 26 MMOL/L (ref 22–31)
COLOR UR AUTO: YELLOW
CREAT SERPL-MCNC: 0.83 MG/DL (ref 0.7–1.3)
GFR SERPL CREATININE-BSD FRML MDRD: >60 ML/MIN/1.73M2
GLUCOSE BLD-MCNC: 81 MG/DL (ref 70–125)
GLUCOSE UR STRIP-MCNC: NEGATIVE MG/DL
HGB UR QL STRIP: ABNORMAL
HYALINE CASTS #/AREA URNS LPF: ABNORMAL LPF
KETONES UR STRIP-MCNC: NEGATIVE MG/DL
LEUKOCYTE ESTERASE UR QL STRIP: NEGATIVE
MUCOUS THREADS #/AREA URNS LPF: ABNORMAL LPF
NITRATE UR QL: NEGATIVE
PH UR STRIP: 5.5 [PH] (ref 5–8)
POTASSIUM BLD-SCNC: 4.6 MMOL/L (ref 3.5–5)
RBC #/AREA URNS AUTO: ABNORMAL HPF
SODIUM SERPL-SCNC: 141 MMOL/L (ref 136–145)
SP GR UR STRIP: >=1.03 (ref 1–1.03)
SQUAMOUS #/AREA URNS AUTO: ABNORMAL LPF
TSH SERPL DL<=0.005 MIU/L-ACNC: 1.18 UIU/ML (ref 0.3–5)
UROBILINOGEN UR STRIP-ACNC: ABNORMAL
WBC #/AREA URNS AUTO: ABNORMAL HPF

## 2019-08-22 ASSESSMENT — MIFFLIN-ST. JEOR: SCORE: 2389.1

## 2019-08-24 LAB — AT III ACT/NOR PPP CHRO: 83 % (ref 85–135)

## 2019-08-27 ENCOUNTER — COMMUNICATION - HEALTHEAST (OUTPATIENT)
Dept: FAMILY MEDICINE | Facility: CLINIC | Age: 34
End: 2019-08-27

## 2019-08-28 LAB
PROT C ACT/NOR PPP CHRO: 112 % (ref 70–170)
PROT S FREE AG ACT/NOR PPP IA: 106 % (ref 70–148)

## 2019-08-29 LAB — FACTOR 5 LEIDEN AND FACTOR 2 PROTHROMBIN MUTATION: NORMAL

## 2019-09-12 ENCOUNTER — OFFICE VISIT - HEALTHEAST (OUTPATIENT)
Dept: FAMILY MEDICINE | Facility: CLINIC | Age: 34
End: 2019-09-12

## 2019-09-12 DIAGNOSIS — I10 BENIGN ESSENTIAL HYPERTENSION: ICD-10-CM

## 2019-09-12 DIAGNOSIS — Z82.49 FAMILY HISTORY OF PULMONARY EMBOLISM: ICD-10-CM

## 2019-09-20 ENCOUNTER — COMMUNICATION - HEALTHEAST (OUTPATIENT)
Dept: ADMINISTRATIVE | Facility: HOSPITAL | Age: 34
End: 2019-09-20

## 2019-10-10 ENCOUNTER — OFFICE VISIT - HEALTHEAST (OUTPATIENT)
Dept: ONCOLOGY | Facility: CLINIC | Age: 34
End: 2019-10-10

## 2019-10-10 DIAGNOSIS — Z82.49 FAMILY HISTORY OF PULMONARY EMBOLISM: ICD-10-CM

## 2019-10-10 ASSESSMENT — MIFFLIN-ST. JEOR: SCORE: 2459.4

## 2019-10-11 ENCOUNTER — COMMUNICATION - HEALTHEAST (OUTPATIENT)
Dept: ADMINISTRATIVE | Facility: HOSPITAL | Age: 34
End: 2019-10-11

## 2019-10-12 LAB
CARDIOLIPIN IGG SER IA-ACNC: <1.6 GPL-U/ML (ref 0–19.9)
CARDIOLIPIN IGM SER IA-ACNC: 1.2 MPL-U/ML (ref 0–19.9)

## 2019-10-14 LAB
B2 GLYCOPROT1 IGG SERPL IA-ACNC: <0.6 U/ML
B2 GLYCOPROT1 IGM SERPL IA-ACNC: <2.9 U/ML

## 2019-10-17 LAB — AT III ACT/NOR PPP CHRO: 102 % (ref 85–135)

## 2019-10-18 LAB — LA PPP-IMP: NEGATIVE

## 2019-11-07 ENCOUNTER — OFFICE VISIT - HEALTHEAST (OUTPATIENT)
Dept: FAMILY MEDICINE | Facility: CLINIC | Age: 34
End: 2019-11-07

## 2019-11-07 DIAGNOSIS — I10 BENIGN ESSENTIAL HYPERTENSION: ICD-10-CM

## 2019-11-07 DIAGNOSIS — G89.29 CHRONIC BILATERAL LOW BACK PAIN, UNSPECIFIED WHETHER SCIATICA PRESENT: ICD-10-CM

## 2019-11-07 DIAGNOSIS — M54.50 CHRONIC BILATERAL LOW BACK PAIN, UNSPECIFIED WHETHER SCIATICA PRESENT: ICD-10-CM

## 2019-11-07 ASSESSMENT — MIFFLIN-ST. JEOR: SCORE: 2477.55

## 2020-01-13 ENCOUNTER — OFFICE VISIT - HEALTHEAST (OUTPATIENT)
Dept: RHEUMATOLOGY | Facility: CLINIC | Age: 35
End: 2020-01-13

## 2020-01-13 DIAGNOSIS — M54.50 CHRONIC BILATERAL LOW BACK PAIN WITHOUT SCIATICA: ICD-10-CM

## 2020-01-13 DIAGNOSIS — G89.29 CHRONIC BILATERAL LOW BACK PAIN WITHOUT SCIATICA: ICD-10-CM

## 2020-01-13 DIAGNOSIS — M54.2 CERVICALGIA: ICD-10-CM

## 2020-01-13 DIAGNOSIS — K50.118 CROHN'S DISEASE OF COLON WITH OTHER COMPLICATION (H): ICD-10-CM

## 2020-01-13 LAB
ALBUMIN SERPL-MCNC: 3.5 G/DL (ref 3.5–5)
ALT SERPL W P-5'-P-CCNC: 49 U/L (ref 0–45)
BASOPHILS # BLD AUTO: 0 THOU/UL (ref 0–0.2)
BASOPHILS NFR BLD AUTO: 1 % (ref 0–2)
C REACTIVE PROTEIN LHE: 0.6 MG/DL (ref 0–0.8)
CREAT SERPL-MCNC: 0.87 MG/DL (ref 0.7–1.3)
EOSINOPHIL # BLD AUTO: 0.2 THOU/UL (ref 0–0.4)
EOSINOPHIL NFR BLD AUTO: 4 % (ref 0–6)
ERYTHROCYTE [DISTWIDTH] IN BLOOD BY AUTOMATED COUNT: 12.6 % (ref 11–14.5)
ERYTHROCYTE [SEDIMENTATION RATE] IN BLOOD BY WESTERGREN METHOD: 10 MM/HR (ref 0–15)
GFR SERPL CREATININE-BSD FRML MDRD: >60 ML/MIN/1.73M2
HCT VFR BLD AUTO: 41.1 % (ref 40–54)
HGB BLD-MCNC: 13.7 G/DL (ref 14–18)
LYMPHOCYTES # BLD AUTO: 1 THOU/UL (ref 0.8–4.4)
LYMPHOCYTES NFR BLD AUTO: 16 % (ref 20–40)
MCH RBC QN AUTO: 28.9 PG (ref 27–34)
MCHC RBC AUTO-ENTMCNC: 33.4 G/DL (ref 32–36)
MCV RBC AUTO: 87 FL (ref 80–100)
MONOCYTES # BLD AUTO: 0.5 THOU/UL (ref 0–0.9)
MONOCYTES NFR BLD AUTO: 8 % (ref 2–10)
NEUTROPHILS # BLD AUTO: 4.5 THOU/UL (ref 2–7.7)
NEUTROPHILS NFR BLD AUTO: 72 % (ref 50–70)
PLATELET # BLD AUTO: 307 THOU/UL (ref 140–440)
PMV BLD AUTO: 7.2 FL (ref 7–10)
RBC # BLD AUTO: 4.75 MILL/UL (ref 4.4–6.2)
RHEUMATOID FACT SERPL-ACNC: <15 IU/ML (ref 0–30)
URATE SERPL-MCNC: 6.4 MG/DL (ref 3–8)
WBC: 6.2 THOU/UL (ref 4–11)

## 2020-01-13 ASSESSMENT — MIFFLIN-ST. JEOR: SCORE: 2477.55

## 2020-01-14 LAB
ANA SER QL: 0.2 U
CCP AB SER IA-ACNC: <0.5 U/ML

## 2020-01-16 LAB
B LOCUS: NORMAL
B27TEST METHOD: NORMAL

## 2020-01-20 ENCOUNTER — HOSPITAL ENCOUNTER (OUTPATIENT)
Dept: MRI IMAGING | Facility: CLINIC | Age: 35
Discharge: HOME | End: 2020-01-20
Attending: INTERNAL MEDICINE

## 2020-01-20 ENCOUNTER — HOSPITAL ENCOUNTER (OUTPATIENT)
Dept: RADIOLOGY | Facility: CLINIC | Age: 35
Discharge: HOME | End: 2020-01-20
Attending: INTERNAL MEDICINE

## 2020-01-20 DIAGNOSIS — M54.2 CERVICALGIA: ICD-10-CM

## 2020-01-20 DIAGNOSIS — K50.118 CROHN'S DISEASE OF COLON WITH OTHER COMPLICATION (H): ICD-10-CM

## 2020-01-20 DIAGNOSIS — M54.50 CHRONIC BILATERAL LOW BACK PAIN WITHOUT SCIATICA: ICD-10-CM

## 2020-01-20 DIAGNOSIS — G89.29 CHRONIC BILATERAL LOW BACK PAIN WITHOUT SCIATICA: ICD-10-CM

## 2020-01-24 ENCOUNTER — HOSPITAL ENCOUNTER (OUTPATIENT)
Dept: MRI IMAGING | Facility: CLINIC | Age: 35
Discharge: HOME | End: 2020-01-24
Attending: INTERNAL MEDICINE

## 2020-01-24 DIAGNOSIS — M54.2 CERVICALGIA: ICD-10-CM

## 2020-01-24 DIAGNOSIS — K50.118 CROHN'S DISEASE OF COLON WITH OTHER COMPLICATION (H): ICD-10-CM

## 2020-01-27 ENCOUNTER — OFFICE VISIT - HEALTHEAST (OUTPATIENT)
Dept: FAMILY MEDICINE | Facility: CLINIC | Age: 35
End: 2020-01-27

## 2020-01-27 DIAGNOSIS — I10 BENIGN ESSENTIAL HYPERTENSION: ICD-10-CM

## 2020-01-27 DIAGNOSIS — Z13.220 LIPID SCREENING: ICD-10-CM

## 2020-01-27 LAB
ANION GAP SERPL CALCULATED.3IONS-SCNC: 6 MMOL/L (ref 5–18)
BUN SERPL-MCNC: 13 MG/DL (ref 8–22)
CALCIUM SERPL-MCNC: 9.5 MG/DL (ref 8.5–10.5)
CHLORIDE BLD-SCNC: 106 MMOL/L (ref 98–107)
CHOLEST SERPL-MCNC: 133 MG/DL
CO2 SERPL-SCNC: 28 MMOL/L (ref 22–31)
CREAT SERPL-MCNC: 0.65 MG/DL (ref 0.7–1.3)
FASTING STATUS PATIENT QL REPORTED: YES
GFR SERPL CREATININE-BSD FRML MDRD: >60 ML/MIN/1.73M2
GLUCOSE BLD-MCNC: 91 MG/DL (ref 70–125)
HDLC SERPL-MCNC: 39 MG/DL
LDLC SERPL CALC-MCNC: 96 MG/DL
POTASSIUM BLD-SCNC: 4.6 MMOL/L (ref 3.5–5)
SODIUM SERPL-SCNC: 140 MMOL/L (ref 136–145)

## 2020-01-29 ENCOUNTER — COMMUNICATION - HEALTHEAST (OUTPATIENT)
Dept: FAMILY MEDICINE | Facility: CLINIC | Age: 35
End: 2020-01-29

## 2020-02-08 ENCOUNTER — HEALTH MAINTENANCE LETTER (OUTPATIENT)
Age: 35
End: 2020-02-08

## 2020-02-17 ENCOUNTER — OFFICE VISIT - HEALTHEAST (OUTPATIENT)
Dept: RHEUMATOLOGY | Facility: CLINIC | Age: 35
End: 2020-02-17

## 2020-02-17 DIAGNOSIS — K50.118 CROHN'S DISEASE OF COLON WITH OTHER COMPLICATION (H): ICD-10-CM

## 2020-02-17 DIAGNOSIS — M47.812 SPONDYLOSIS OF CERVICAL REGION WITHOUT MYELOPATHY OR RADICULOPATHY: ICD-10-CM

## 2020-02-17 DIAGNOSIS — M43.28 ANKYLOSIS OF SACROILIAC JOINT: ICD-10-CM

## 2020-02-17 ASSESSMENT — MIFFLIN-ST. JEOR: SCORE: 2572.8

## 2020-03-04 ENCOUNTER — RECORDS - HEALTHEAST (OUTPATIENT)
Dept: ADMINISTRATIVE | Facility: OTHER | Age: 35
End: 2020-03-04

## 2020-04-20 ENCOUNTER — RECORDS - HEALTHEAST (OUTPATIENT)
Dept: ADMINISTRATIVE | Facility: OTHER | Age: 35
End: 2020-04-20

## 2020-04-24 ENCOUNTER — COMMUNICATION - HEALTHEAST (OUTPATIENT)
Dept: RHEUMATOLOGY | Facility: CLINIC | Age: 35
End: 2020-04-24

## 2020-04-24 DIAGNOSIS — M47.812 SPONDYLOSIS OF CERVICAL REGION WITHOUT MYELOPATHY OR RADICULOPATHY: ICD-10-CM

## 2020-05-13 ENCOUNTER — COMMUNICATION - HEALTHEAST (OUTPATIENT)
Dept: FAMILY MEDICINE | Facility: CLINIC | Age: 35
End: 2020-05-13

## 2020-05-18 ENCOUNTER — OFFICE VISIT - HEALTHEAST (OUTPATIENT)
Dept: RHEUMATOLOGY | Facility: CLINIC | Age: 35
End: 2020-05-18

## 2020-05-18 DIAGNOSIS — M47.812 SPONDYLOSIS OF CERVICAL REGION WITHOUT MYELOPATHY OR RADICULOPATHY: ICD-10-CM

## 2020-05-18 DIAGNOSIS — M43.28 ANKYLOSIS OF SACROILIAC JOINT: ICD-10-CM

## 2020-05-18 DIAGNOSIS — M54.2 CERVICALGIA: ICD-10-CM

## 2020-05-18 DIAGNOSIS — K50.118 CROHN'S DISEASE OF COLON WITH OTHER COMPLICATION (H): ICD-10-CM

## 2020-05-20 ENCOUNTER — COMMUNICATION - HEALTHEAST (OUTPATIENT)
Dept: RHEUMATOLOGY | Facility: CLINIC | Age: 35
End: 2020-05-20

## 2020-05-27 ENCOUNTER — OFFICE VISIT - HEALTHEAST (OUTPATIENT)
Dept: FAMILY MEDICINE | Facility: CLINIC | Age: 35
End: 2020-05-27

## 2020-05-27 DIAGNOSIS — M43.28 ANKYLOSIS OF SACROILIAC JOINT: ICD-10-CM

## 2020-05-27 DIAGNOSIS — I10 BENIGN ESSENTIAL HYPERTENSION: ICD-10-CM

## 2020-05-27 DIAGNOSIS — M47.812 SPONDYLOSIS OF CERVICAL REGION WITHOUT MYELOPATHY OR RADICULOPATHY: ICD-10-CM

## 2020-05-27 DIAGNOSIS — K50.118 CROHN'S DISEASE OF COLON WITH OTHER COMPLICATION (H): ICD-10-CM

## 2020-05-28 ENCOUNTER — VIRTUAL VISIT (OUTPATIENT)
Dept: FAMILY MEDICINE | Facility: OTHER | Age: 35
End: 2020-05-28

## 2020-05-28 LAB
ANION GAP SERPL CALCULATED.3IONS-SCNC: 6 MMOL/L (ref 5–18)
BUN SERPL-MCNC: 10 MG/DL (ref 8–22)
CALCIUM SERPL-MCNC: 9.1 MG/DL (ref 8.5–10.5)
CHLORIDE BLD-SCNC: 107 MMOL/L (ref 98–107)
CO2 SERPL-SCNC: 27 MMOL/L (ref 22–31)
CREAT SERPL-MCNC: 1.01 MG/DL (ref 0.7–1.3)
GFR SERPL CREATININE-BSD FRML MDRD: >60 ML/MIN/1.73M2
GLUCOSE BLD-MCNC: 78 MG/DL (ref 70–125)
POTASSIUM BLD-SCNC: 4.2 MMOL/L (ref 3.5–5)
SODIUM SERPL-SCNC: 140 MMOL/L (ref 136–145)

## 2020-05-28 NOTE — PROGRESS NOTES
"Date: 2020 18:36:54  Clinician: Monica Lyle  Clinician NPI: [id number]  Patient: Jose Zuniga  Patient : 1985  Patient Address: [address] [place] [address]  Patient Phone: [phone]  Visit Protocol: URI  Patient Summary:  Jose is a 35 year old ( : 1985 ) male who initiated a Visit for COVID-19 (Coronavirus) evaluation and screening. When asked the question \"Please sign me up to receive news, health information and promotions. \", Jose responded \"No\".    His symptoms consist of diarrhea, a sore throat, myalgia, nasal congestion, and a headache.   Symptom details     Nasal secretions: The color of his mucus is blood-tinged.    Sore throat: Jose reports having mild throat pain (1-3 on a 10 point pain scale), does not have exudate on his tonsils, and can swallow liquids. The lymph nodes in his neck are not enlarged. A rash has not appeared on the skin since the sore throat started.     Headache: He states the headache is mild (1-3 on a 10 point pain scale).      Jose denies having wheezing, nausea, teeth pain, ageusia, enlarged lymph nodes, malaise, anosmia, facial pain or pressure, fever, cough, vomiting, rhinitis, ear pain, and chills. He also denies having recent facial or sinus surgery in the past 60 days and taking antibiotic medication for the symptoms. He is not experiencing dyspnea.   Precipitating events  Within the past week, Jose has not been exposed to someone with strep throat. He has not recently been exposed to someone with influenza. Jose has been in close contact with the following high risk individuals: pregnant women.   Pertinent COVID-19 (Coronavirus) information  In the past 14 days, Jose has not worked in a congregate living setting.   He does not work or volunteer as healthcare worker or a  and does not work or volunteer in a healthcare facility.   Jose also has not lived in a congregate living setting in the past 14 days. He does not " live with a healthcare worker.   Jose has had a close contact with a laboratory-confirmed COVID-19 patient within 14 days of symptom onset. Additional information about contact with COVID-19 (Coronavirus) patient as reported by the patient (free text): In a work (office setting) I was exposed to a coworker. The office notified us of the exposure today.   Pertinent medical history  Jose needs a return to work/school note.   Weight: 330 lbs   Jose smokes or uses smokeless tobacco.   Weight: 330 lbs    MEDICATIONS: amlodipine oral, losartan oral, Stelara subcutaneous, methotrexate sodium oral, duloxetine oral, ALLERGIES: NKDA  Clinician Response:  Dear Jose,      Your symptoms show that you may have coronavirus (COVID-19). This illness can cause fever, cough and trouble breathing. Many people get a mild case and get better on their own. Some people can get very sick.  What should I do?  Based on your medication list, it looks like you have been diagnosed with high blood pressure and maybe arthritis or psoriasis. Because you have these chronic conditions, we would like to test you for this virus. This will be a curbside test done outside the clinic.  Please call [phone] to schedule your visit. Explain that you were referred by [facility] to have a COVID-19 test. Be ready to share your [facility] visit ID number.  Starting now:  Stay at least 6 feet away from others. (If someone will drive you to your test, stay in the backseat, as far away from the  as you can.)   Don't go to work, school or anywhere else. When it's time for your test, go straight to the testing site. Don't make any stops on the way there or back.   Wash your hands and face often. Use soap and water.   Cover your mouth and nose with a mask, tissue or washcloth.   Don't touch anyone. No hugging, kissing or handshakes.  While at home   Stay home and away from others (self-isolate) until:  You've had no fever---and no medicine that reduces  "fever---for 3 full days (72 hours). And...  Your other symptoms have gotten better. For example, your cough or breathing has improved. And...  At least 10 days have passed since your symptoms started.  During this time:  Stay in your own room (and use your own bathroom), if you can.  Don't go to work, school or anywhere else.  Stay away from others in your home. No hugging, kissing or shaking hands.  Don't let anyone visit.  Cover your mouth and nose with a mask, tissue or washcloth to avoid spreading germs.  Clean \"high touch\" surfaces often (doorknobs, counters, handles, etc.). Use a household cleaning spray or wipes.  Wash your hands and face often. Use soap and water.  How can I take care of myself?  1. Get lots of rest. Drink extra fluids (unless your doctor has told you not to).  2. Take Tylenol (acetaminophen) for fever or pain. If you have liver or kidney problems, ask your family doctor if it's okay to take Tylenol.  Adults can take either:   650 mg (two 325 mg pills) every 4 to 6 hours, or...  1,000 mg (two 500 mg pills) every 8 hours as needed.   Note: Don't take more than 3,000 mg in one day.   Acetaminophen is found in many medicines (both prescribed and over-the-counter medicines). Read all labels to be sure you don't take too much.   For children, check the Tylenol bottle for the right dose. The dose is based on the child's age or weight.  3. If you have other health problems (like cancer, heart failure, an organ transplant or severe kidney disease): Call your specialty clinic if you don't feel better in the next 2 days.  4. Know when to call 911: If your breathing is so bad that it keeps you from doing normal activities, call 911 or go to the emergency room. Tell them that you've been staying home and may have COVID-19.  5. Sign up for [employer]. We know it's scary to hear that you might have COVID-19. We want to track your symptoms to make sure you're okay over the next 2 weeks. Please look for " an email from [employer]---this is a free, online program that we'll use to keep in touch. To sign up, follow the link in the email. Learn more at [email or web address].  6. The following will serve as your written order for this Covid Test ordered by me for the indication of suspected Covid [Z20.828]: The test will be ordered in [facility], our electronic health record after you are scheduled and will show as ordered and authorized by Gary Salazar MD   Order: Covid-19 (Coronavirus) PCR for SYMPTOMATIC testing from [facility]  Where can I get more information?  To learn more about COVID-19 and how to care for yourself at home, please visit the CDC website at https://www.cdc.gov/coronavirus/2019-ncov/about/steps-when-sick.html.  For more about your care at [facility], please visit [email or web address]  If you'd like to be part of a COVID-19 clinical trial (research study) at the [facility], go to [email or web address] for details.    Diagnosis: Acute pharyngitis, unspecified  Diagnosis ICD: J02.9  Additional Clinician Notes: Before returning to work, you must be fever free for 3 days and have an improvement in symptoms. Contact your Employee Health Department for your company's return to work guidelines.

## 2020-06-01 DIAGNOSIS — Z20.822 SUSPECTED COVID-19 VIRUS INFECTION: Primary | ICD-10-CM

## 2020-06-01 PROCEDURE — 87635 SARS-COV-2 COVID-19 AMP PRB: CPT | Mod: 90 | Performed by: FAMILY MEDICINE

## 2020-06-01 PROCEDURE — 99000 SPECIMEN HANDLING OFFICE-LAB: CPT | Performed by: FAMILY MEDICINE

## 2020-06-01 PROCEDURE — 99207 ZZC NO BILLABLE SERVICE THIS VISIT: CPT

## 2020-06-01 NOTE — PROGRESS NOTES
Provided pt with covid 19 education handout.  COVID-19 PCR test completed. Patient handout For Patients Who Have Been Tested for Covid-19 (Coronavirus) was given to the patient, which includes test result notification process.

## 2020-06-02 LAB
SARS-COV-2 RNA SPEC QL NAA+PROBE: NOT DETECTED
SPECIMEN SOURCE: NORMAL

## 2020-07-20 ENCOUNTER — COMMUNICATION - HEALTHEAST (OUTPATIENT)
Dept: FAMILY MEDICINE | Facility: CLINIC | Age: 35
End: 2020-07-20

## 2020-07-20 DIAGNOSIS — I10 BENIGN ESSENTIAL HYPERTENSION: ICD-10-CM

## 2020-08-13 ENCOUNTER — COMMUNICATION - HEALTHEAST (OUTPATIENT)
Dept: RHEUMATOLOGY | Facility: CLINIC | Age: 35
End: 2020-08-13

## 2020-08-13 ENCOUNTER — COMMUNICATION - HEALTHEAST (OUTPATIENT)
Dept: LAB | Facility: CLINIC | Age: 35
End: 2020-08-13

## 2020-08-13 DIAGNOSIS — M43.28 ANKYLOSIS OF SACROILIAC JOINT: ICD-10-CM

## 2020-08-13 DIAGNOSIS — M47.812 SPONDYLOSIS OF CERVICAL REGION WITHOUT MYELOPATHY OR RADICULOPATHY: ICD-10-CM

## 2020-08-14 ENCOUNTER — AMBULATORY - HEALTHEAST (OUTPATIENT)
Dept: LAB | Facility: CLINIC | Age: 35
End: 2020-08-14

## 2020-08-14 DIAGNOSIS — M43.28 ANKYLOSIS OF SACROILIAC JOINT: ICD-10-CM

## 2020-08-14 LAB
ALBUMIN SERPL-MCNC: 3.7 G/DL (ref 3.5–5)
ALT SERPL W P-5'-P-CCNC: 38 U/L (ref 0–45)
CREAT SERPL-MCNC: 1.02 MG/DL (ref 0.7–1.3)
ERYTHROCYTE [DISTWIDTH] IN BLOOD BY AUTOMATED COUNT: 12.6 % (ref 11–14.5)
GFR SERPL CREATININE-BSD FRML MDRD: >60 ML/MIN/1.73M2
HCT VFR BLD AUTO: 41.6 % (ref 40–54)
HGB BLD-MCNC: 14.7 G/DL (ref 14–18)
MCH RBC QN AUTO: 30.1 PG (ref 27–34)
MCHC RBC AUTO-ENTMCNC: 35.3 G/DL (ref 32–36)
MCV RBC AUTO: 85 FL (ref 80–100)
PLATELET # BLD AUTO: 341 THOU/UL (ref 140–440)
PMV BLD AUTO: 7.2 FL (ref 7–10)
RBC # BLD AUTO: 4.88 MILL/UL (ref 4.4–6.2)
WBC: 6.1 THOU/UL (ref 4–11)

## 2020-08-31 ENCOUNTER — OFFICE VISIT - HEALTHEAST (OUTPATIENT)
Dept: RHEUMATOLOGY | Facility: CLINIC | Age: 35
End: 2020-08-31

## 2020-08-31 DIAGNOSIS — M43.28 ANKYLOSIS OF SACROILIAC JOINT: ICD-10-CM

## 2020-08-31 DIAGNOSIS — M54.2 CERVICALGIA: ICD-10-CM

## 2020-08-31 DIAGNOSIS — K50.118 CROHN'S DISEASE OF COLON WITH OTHER COMPLICATION (H): ICD-10-CM

## 2020-08-31 DIAGNOSIS — M47.812 SPONDYLOSIS OF CERVICAL REGION WITHOUT MYELOPATHY OR RADICULOPATHY: ICD-10-CM

## 2020-10-24 ENCOUNTER — RECORDS - HEALTHEAST (OUTPATIENT)
Dept: ADMINISTRATIVE | Facility: OTHER | Age: 35
End: 2020-10-24

## 2020-11-07 ENCOUNTER — HEALTH MAINTENANCE LETTER (OUTPATIENT)
Age: 35
End: 2020-11-07

## 2020-11-30 ENCOUNTER — OFFICE VISIT - HEALTHEAST (OUTPATIENT)
Dept: RHEUMATOLOGY | Facility: CLINIC | Age: 35
End: 2020-11-30

## 2020-11-30 DIAGNOSIS — M43.28 ANKYLOSIS OF SACROILIAC JOINT: ICD-10-CM

## 2020-11-30 DIAGNOSIS — M47.812 SPONDYLOSIS OF CERVICAL REGION WITHOUT MYELOPATHY OR RADICULOPATHY: ICD-10-CM

## 2020-11-30 DIAGNOSIS — M54.2 CERVICALGIA: ICD-10-CM

## 2020-12-01 ENCOUNTER — COMMUNICATION - HEALTHEAST (OUTPATIENT)
Dept: RHEUMATOLOGY | Facility: CLINIC | Age: 35
End: 2020-12-01

## 2021-03-27 ENCOUNTER — HEALTH MAINTENANCE LETTER (OUTPATIENT)
Age: 36
End: 2021-03-27

## 2021-05-25 ENCOUNTER — RECORDS - HEALTHEAST (OUTPATIENT)
Dept: ADMINISTRATIVE | Facility: CLINIC | Age: 36
End: 2021-05-25

## 2021-05-27 ENCOUNTER — RECORDS - HEALTHEAST (OUTPATIENT)
Dept: ADMINISTRATIVE | Facility: CLINIC | Age: 36
End: 2021-05-27

## 2021-05-28 NOTE — PROGRESS NOTES
Preoperative Exam          Scheduled Procedure:LAPAROSCOPIC Partial  ILEOCOLIC RESECTION  Surgery Date:  5/8/19  Surgery Location: [facility]  Fax to: [phone] & [phone]  Surgeon:  Dr. Pankaj Radford    Assessment/Plan:     1. Preop cardiovascular exam  Electrocardiogram Perform - Clinic    Basic Metabolic Panel    HM2(CBC w/o Differential)    Hepatic Profile   2. Crohn's disease of colon with other complication (H)  Electrocardiogram Perform - Clinic    Basic Metabolic Panel    HM2(CBC w/o Differential)    Hepatic Profile   3. Tobacco abuse  Electrocardiogram Perform - Clinic   4. Family history of pulmonary embolism         I encouraged him to totally discontinue chewing tobacco and he agrees.  If he has any difficulty, he could use nicotine patch to manage cravings.  I explained that if need be we could use bupropion but would wait until after the surgery to consider starting that if it is needed.  He will continue his prednisone taper as instructed.  He knows to be n.p.o. on the morning of surgery.      Surgical Procedure Risk: Low (reported cardiac risk generally < 1%)  Have you had prior anesthesia?: Yes  Have you or any family members had a previous anesthesia reaction:  No  Do you or any family members have a history of a clotting or bleeding disorder?: Yes: father &paternal grandmother passed from plum embolisim  Cardiac Risk Assessment: no increased risk for major cardiac complications    Patient approved for surgery with general or local anesthesia.    Please Note:  No special equipment considerations.    Functional Status: Independent  Patient plans to recover at home with family.     Subjective:      Jose Zuniga is a 34 y.o. male who presents for a preoperative consultation.  Last July he had a hospitalization due to a flare of his Crohn's disease.  Then in October he had another flare and was hospitalized at [facility].  The [facility] was not working.  He was changed to  Stelara after his December visit with GI.  He apparently has a large stricture that sounds like it is probably in the distal ileum which is not able to respond to the immune system therapy and it has been advised that he have a partial resection and he has seen Dr. Pankaj Radford who will be doing this partial ileal colectomy at [facility] on May 8.  He notes he is also been missing work because of the intermittent pain nausea and vomiting.  He has only had occasional slight blood in the stool and that is infrequent.  He has no fever.  No trouble with urination.  He has not smoked cigarettes but he does chew tobacco.  One time when he quit in the past he had significant craving occur at about day #20, and was not able to continue cessation then.  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  His last dose of Stelara was on April 7 and he will not take any further prior to the surgery.  Then it is anticipated that he will be starting that sometime after the surgery by his GI specialist for managing the remainder of the Crohn's disease.    All other systems reviewed and are negative, other than those listed in the HPI.    Pertinent History  Do you have difficulty breathing or chest pain after walking up a flight of stairs: No  History of obstructive sleep apnea: No  Steroid use in the last 6 months: Yes: predisone   Frequent Aspirin/NSAID use: No  Prior Blood Transfusion: No  Prior Blood Transfusion Reaction: N/A  If for some reason prior to, during or after the procedure, if it is medically indicated, would you be willing to have a blood transfusion?:  There is no transfusion refusal.    Current Outpatient Medications   Medication Sig Dispense Refill     calcium, as carbonate, (OS-FAUSTINO) 500 mg calcium (1,250 mg) tablet Take 1 tablet (500 mg total) by mouth 2 (two) times a day.  0     multivitamin with minerals (THERA-M) 9 mg iron-400 mcg Tab tablet Take 1  tablet by mouth daily as needed.        predniSONE (DELTASONE) 10 mg tablet pack 4 tabs daily for 2 weeks, 3 tabs daily for 2 weeks, 2 tabs daily for 2 weeks then further prescription per primary. 126 tablet 0     STELARA 90 mg/mL Syrg        No current facility-administered medications for this visit.         No Known Allergies    Patient Active Problem List   Diagnosis     Allergic Rhinitis     Localized Primary Osteoarthritis Of The Right Shoulder Glenohumeral Joint     Lower Back Pain     Neck Pain     Cervical Radiculopathy     Myalgia And Myositis     Lumbar radiculopathy     Crohn's colitis (H)     Anemia     Tobacco abuse     Osteoarthritis of lumbar spine     Osteoarthritis cervical spine     Abdominal pain       Past Medical History:   Diagnosis Date     Chronic back pain      Chronic neck pain      Eczema     mild, as a child     Shingles        Past Surgical History:   Procedure Laterality Date     COLONOSCOPY N/A 8/28/2015    Procedure: COLONOSCOPY with biopsies using forceps;  Surgeon: Hina Madison MD;  Location: [facility];  Service:      NM APPENDECTOMY      Description: Appendectomy;  Proc Date: 01/01/2005;     NM REMOVAL OF TONSILS,<11 Y/O      Description: Tonsillectomy;  Recorded: 09/04/2013;  Comments: childhood     NM SHLDR ARTHROSCOP,DIAGNOSTIC Left x2    Description: Arthroscopy Shoulder Left;  Proc Date: 01/01/2000;  Comments: labral repair by Dr Hector     SHOULDER SURGERY Right        Social History     Socioeconomic History     Marital status:      Spouse name: Val     Number of children: Not on file     Years of education: Not on file     Highest education level: Not on file   Occupational History     Not on file   Social Needs     Financial resource strain: Not on file     Food insecurity:     Worry: Not on file     Inability: Not on file     Transportation needs:     Medical: Not on file     Non-medical: Not on file   Tobacco Use     Smoking status: Never Smoker      "Smokeless tobacco: Current User     Types: Chew     Tobacco comment: No exposure   Substance and Sexual Activity     Alcohol use: Yes     Alcohol/week: 1.2 oz     Types: 2 Standard drinks or equivalent per week     Drug use: No     Sexual activity: Not on file   Lifestyle     Physical activity:     Days per week: Not on file     Minutes per session: Not on file     Stress: Not on file   Relationships     Social connections:     Talks on phone: Not on file     Gets together: Not on file     Attends Worship service: Not on file     Active member of club or organization: Not on file     Attends meetings of clubs or organizations: Not on file     Relationship status: Not on file     Intimate partner violence:     Fear of current or ex partner: Not on file     Emotionally abused: Not on file     Physically abused: Not on file     Forced sexual activity: Not on file   Other Topics Concern     Not on file   Social History Narrative     Not on file     Family history-his father  of pulmonary embolism in his 50s.  The patient has not experienced any deep venous thrombosis or pulmonary embolism.    Patient Care Team:  Eusebio Lewis MD as PCP - General          Objective:     Vitals:    19 0926   BP: 130/90   Pulse: 83   Resp: 22   Temp: 98.5  F (36.9  C)   TempSrc: Oral   SpO2: 98%   Weight: (!) 307 lb (139.3 kg)   Height: 6' 2\" (1.88 m)         Physical Exam:  Physical Exam   Alert male.  Head normocephalic.  External ears and TMs are normal.  Eyes show pupils equal round and react to light and accommodation.  Nose and oropharynx unremarkable.  Neck supple without adenopathy or thyromegaly.  Lungs clear to auscultation.  Heart regular rate and rhythm without murmur.  Abdomen shows no masses or hepatosplenomegaly.  No current tenderness to palpation.  Has well-healed right lower quadrant surgical scar from previous appendectomy.  Genitalia show normal testes, no hernia.  Rectal examination unremarkable with " smooth prostate.  No pedal edema.  Good peripheral pulses.  Skin is not remarkable.  He is alert with clear speech.  No focal neurologic abnormalities.        There are no Patient Instructions on file for this visit.    EKG: Normal sinus rhythm, very minimal nonspecific ST-T wave changes.  No previous EKG for comparison.    Labs:  Labs pending at this time.  Results will be reviewed when available.    Immunization History   Administered Date(s) Administered     Hep A, Adult IM (19yr & older) 10/29/2018     Hep B, Peds or Adolescent 02/10/2011     Hep B, historic 02/10/2010     Influenza, inj, historic,unspecified 10/23/2015     Influenza, seasonal,quad inj 6-35 mos 10/23/2015     Influenza,seasonal quad, PF, 36+MOS 10/24/2016, 10/25/2017, 10/22/2018     MMR 04/26/1995     Pneumo Polysac 23-V 10/23/2015     Td, adult adsorbed, PF 01/02/2004     Tdap 02/11/2011           Electronically signed by Eusebio Lewis MD 04/22/19 9:19 AM

## 2021-05-29 ENCOUNTER — RECORDS - HEALTHEAST (OUTPATIENT)
Dept: ADMINISTRATIVE | Facility: CLINIC | Age: 36
End: 2021-05-29

## 2021-05-30 ENCOUNTER — RECORDS - HEALTHEAST (OUTPATIENT)
Dept: ADMINISTRATIVE | Facility: CLINIC | Age: 36
End: 2021-05-30

## 2021-05-30 VITALS — WEIGHT: 279 LBS | BODY MASS INDEX: 34.87 KG/M2

## 2021-05-30 VITALS — HEIGHT: 75 IN | BODY MASS INDEX: 34.73 KG/M2 | WEIGHT: 279.3 LBS

## 2021-05-30 VITALS — WEIGHT: 287.7 LBS | BODY MASS INDEX: 35.96 KG/M2

## 2021-05-30 VITALS — BODY MASS INDEX: 34.94 KG/M2 | WEIGHT: 281 LBS | HEIGHT: 75 IN

## 2021-05-30 VITALS — BODY MASS INDEX: 34.72 KG/M2 | WEIGHT: 277.8 LBS

## 2021-05-30 VITALS — BODY MASS INDEX: 34.87 KG/M2 | WEIGHT: 279 LBS

## 2021-05-31 ENCOUNTER — RECORDS - HEALTHEAST (OUTPATIENT)
Dept: ADMINISTRATIVE | Facility: CLINIC | Age: 36
End: 2021-05-31

## 2021-05-31 VITALS — WEIGHT: 278 LBS | BODY MASS INDEX: 34.75 KG/M2

## 2021-05-31 VITALS — WEIGHT: 280 LBS | BODY MASS INDEX: 35 KG/M2

## 2021-05-31 VITALS — HEIGHT: 75 IN | BODY MASS INDEX: 34.57 KG/M2 | WEIGHT: 278 LBS

## 2021-05-31 VITALS — BODY MASS INDEX: 35 KG/M2 | WEIGHT: 280 LBS

## 2021-05-31 NOTE — TELEPHONE ENCOUNTER
Who is calling: Patient  Reason for Call:  Patient states his mother is currently in the hospital due to having blood clots in her lungs. Patient states his father passed away, due to blood clots in his lungs. Questioning if he should have genetic testing moving forward. Please advise, and reach out to the patient and advise.  Date of last appointment with primary care: 04/22/2019  Okay to leave a detailed message: Yes

## 2021-05-31 NOTE — PROGRESS NOTES
Assessment:  1.  Family history of pulmonary embolism in both of his parents.  2.  Significant elevated blood pressure, but significant recent stress and lack of sleep.  3.  Obesity.    Plan: We will check the factor V Leiden, protein C level, protein S level and anti-thrombin level.  Check basic metabolic profile TSH and urinalysis.  Follow-up in 2 weeks for recheck on blood pressure.  In the meantime avoid sodium salt, reduce caffeine intake, get more rest here as possible, but explained if there is not improvement in his blood pressure that we would need to start medication.  He understands and agrees.  Spent in excess of 25 minutes release 50% in counseling.  Explained that once we know the above coagulation results we can determine whether or not to pursue an hematology consultation as well as having the input from his mother seeing the hematologist.  At this point it would probably not involve starting anticoagulation for him but being aware of potential strategies down the line.    Subjective: 34-year-old male presents for evaluation of possible clotting predisposition given his family history.  His father had  at age 62 of a pulmonary embolus with no prior history of DVT, this was back in .  His mother was just hospitalized last Saturday 5 days ago with diagnosis of multiple leg DVTs and bilateral pulmonary emboli seen on CAT scan and is now on blood thinner and will be having further evaluation through hematology.  She had no prior history of blood clot or pulmonary embolus.  His brother Sammy did have some testing after his father's death and was found to have factor V Leiden.  His paternal grandmother  of a blood clot at age 58 but this was after surgery for colon cancer apparently.  He notes he has not slept very well the last 3-4 nights because of his mother's situation and has been drinking much more caffeine than usual.  He had only had one cup of coffee this morning before the visit to see  "me.  He is not having any headaches or dizziness or leg swelling.  He had the surgery earlier this year for the Crohn's disease and feels that he is doing so much better and is not having any abdominal pain now at all.  He does have some stiffness in both the neck and the lower back and some aching with his known cervical and lumbar osteoarthritis.  He is not taking any ibuprofen or Aleve because of his Crohn's disease issue in the medication he is on for that.  Patient Active Problem List   Diagnosis     Allergic Rhinitis     Localized Primary Osteoarthritis Of The Right Shoulder Glenohumeral Joint     Lower Back Pain     Neck Pain     Cervical Radiculopathy     Lumbar radiculopathy     Crohn's colitis (H)     Anemia     Osteoarthritis of lumbar spine     Osteoarthritis cervical spine     Past Medical History:   Diagnosis Date     Chronic back pain      Chronic neck pain      Eczema     mild, as a child     Shingles      No Known Allergies  Current Outpatient Medications   Medication Sig Dispense Refill     calcium, as carbonate, (OS-FAUSTINO) 500 mg calcium (1,250 mg) tablet Take 1 tablet (500 mg total) by mouth 2 (two) times a day.  0     folic acid (FOLVITE) 1 MG tablet Take 1,000 mcg by mouth daily.  11     methotrexate, PF, 25 mg/mL Soln injection 25 mg.       multivitamin with minerals (THERA-M) 9 mg iron-400 mcg Tab tablet Take 1 tablet by mouth daily as needed.        predniSONE (DELTASONE) 10 mg tablet pack 4 tabs daily for 2 weeks, 3 tabs daily for 2 weeks, 2 tabs daily for 2 weeks then further prescription per primary. 126 tablet 0     STELARA 90 mg/mL Syrg        No current facility-administered medications for this visit.      All other review of systems are negative.    Objective:BP (!) 160/104 (Patient Site: Left Arm, Patient Position: Sitting, Cuff Size: Adult Large)   Pulse 81   Ht 6' 1\" (1.854 m)   Wt (!) 312 lb (141.5 kg)   SpO2 98%   BMI 41.16 kg/m      "

## 2021-05-31 NOTE — TELEPHONE ENCOUNTER
Do have him schedule appointment to see me so that we can decide further specific blood tests.  We can further discuss the specific options in terms of blood testing.

## 2021-06-01 ENCOUNTER — COMMUNICATION - HEALTHEAST (OUTPATIENT)
Dept: FAMILY MEDICINE | Facility: CLINIC | Age: 36
End: 2021-06-01

## 2021-06-01 NOTE — PROGRESS NOTES
Assessment:  1.  Hypertension, needs medication.  2.  Family history of pulmonary embolism, he has borderline low Antithrombin III level.    Plan: For non-medicine measures for the hypertension, recommend low-sodium diet, continuing to minimize alcohol, continuing to avoid any tobacco products, regular physical activity, pursue gradual weight reduction and he is planning on starting of variety of the Atkins diet which he had success with when he was in his 20s.  Follow-up in 1 and half months for recheck but earlier as needed.  Start amlodipine 5 mg-1 p.o. daily #30 refillable x2.  And recommend hematology consultation regarding the family history of pulmonary embolism in his current blood tests.  Explained the low Antithrombin III level could be related to his inflammatory bowel disease, the remainder of his coagulation studies were normal, but his overall situation is complex enough that it is very worthwhile getting the consultation.  He understands and agrees.  Spent 25 minutes release 50% in counseling.    Subjective: 34-year-old male presenting for follow-up on the above.  He only has about 2 drinks of alcohol per week, does not smoke or use chewing tobacco now, is planning on trying to work on weight reduction, knows that he is having too much in the way of starches and pasta.  He does not have any headaches or dizziness or visual difficulties etc.  He is willing to start medication but is hoping he will be able to get off of it in the future if he can lose enough weight etc.  He notes that his mother went to her hematology consultation and is being treated for her pulmonary embolism.  See the past note regarding remainder of his family history.  All other review of systems are negative.  Patient Active Problem List   Diagnosis     Allergic Rhinitis     Localized Primary Osteoarthritis Of The Right Shoulder Glenohumeral Joint     Lower Back Pain     Neck Pain     Cervical Radiculopathy     Lumbar  radiculopathy     Crohn's colitis (H)     Anemia     Osteoarthritis of lumbar spine     Osteoarthritis cervical spine     Family history of pulmonary embolism     All other review of systems are negative.    Objective:BP (!) 148/98 (Patient Site: Left Arm, Patient Position: Sitting, Cuff Size: Adult Large)   Pulse 77   Wt (!) 321 lb (145.6 kg)   SpO2 97%   BMI 42.35 kg/m    Other than his weight being higher his exam is otherwise unchanged.  See the lab levels of 8/22/2019 with normal factor V Leiden, normal protein S and protein C, borderline low Antithrombin III level at 83, previous CBC back in May 2019 as well.

## 2021-06-02 VITALS — BODY MASS INDEX: 39.4 KG/M2 | WEIGHT: 307 LBS | HEIGHT: 74 IN

## 2021-06-02 VITALS — WEIGHT: 305 LBS | BODY MASS INDEX: 38.12 KG/M2

## 2021-06-02 NOTE — PROGRESS NOTES
[facility] Hematology and Oncology Consult Note    Patient: Jose Zuniga  MRN: [id number]  Date of Service: 10/10/2019      Reason for Visit    Chief Complaint   Patient presents with     Benign Hematology     Referred by Dr. Lewis regarding family history of pulmonary embolism    Assessment/Plan    ECOG Performance   ECOG Performance Status: 0  Distress Assessment  Distress Assessment Score: 8(work stress, visit today)    #.  Mildly low Antithrombin III activity  #.  Family history of venous thromboembolism in both parents  #.  Family history of factor V Leiden mutation in his brother  #.  Crohn's disease  #.  Obesity     I reviewed recently completed labs including factor V Leiden, prothrombin gene mutation and they were normal.  Protein C and protein S activity levels were also normal.  Coagulation profile about 6 years ago was unremarkable.  He does not have personal history of venous thromboembolism and prior surgery without any issues.     I discussed with him about expanding hypercoagulable work-up for antiphospholipid antibody syndrome and repeating Antithrombin III level.   He clinically had multiple challenges prior without any clinical evidence of venous thromboembolism.  Again, I discussed about primary prevention for venous thromboembolism in his situation is primarily on awareness of signs and symptoms of venous thromboembolism and to seek medical attention immediately.  I discussed with him in details about that.  I discussed about other risk factor for venous thromboembolism including chronic inflammatory disease, heavy weight.  He is continued working on weight loss.  He will is also on appropriate management for his Crohn's disease as well.   I would not recommend any pharmacologic primary prophylaxis.    -I will communicate once the results are available.  -No primary prophylaxis with pharmacologic agents.  However, he is advised to remain active, avoiding immobilization, proper hydration,  awareness of signs and symptoms of venous thromboembolism and time to seek medical attention.    Problem List    1. Family history of pulmonary embolism  Phospholipid Ab (Cardiolip) IgM/IgG    Beta-2 Glycoprotein 1 Antibodies, IgG, IgM    Lupus Anticoagulant Panel    Antithrombin III Activity    CANCELED: Lupus Anticoagulant    CANCELED: Antithrombin III Activity     ______________________________________________________________________________    History    Mr. Jose Zuniga is a very pleasant 34-year-old gentleman presented by himself today to review the blood test completed for thrombophilia and discuss about risk reduction for venous thromboembolism.  He personally never had venous thromboembolism.  However, his father  from saddle PE at age 62 suddenly.  He was prep for colonoscopy for screening purposes at that time.  His colon prep was not adequate that he was continuing for bowel prep.  No active cancer at the time of death.  He never had prior history of DVT or PE.  Recently, his mom was diagnosed with DVT and pulmonary emboli at age 62 during the process of right knee total arthroplasty.  She was limiting on her activity on the right leg and found right lower extremity DVT and PE.  Surgery was postponed.  That all triggered the family to screen for thrombophilia work-up.  Reported his brother had factor V Leiden but he does not know whether he has homozygous or heterozygous.     He has Crohn's disease, high blood pressure.  He had bowel resection in May 2019. His Crohn's disease is controlled.  Prior surgery with appendectomy in about  and 3 shoulder surgeries before.  Did not have any venous thromboembolic complication with those surgeries.  He is .  No children.  He is a .  Never smoked.  He drinks alcohol about 2/week.    Past History    Past Medical History:   Diagnosis Date     Chronic back pain      Chronic neck pain      Crohn's disease (H)      Eczema     mild,  as a child     Hypertension      Shingles     Family History   Problem Relation Age of Onset     Colon cancer Mother      Pulmonary embolism Mother      Heart disease Father      Kidney disease Father      Pulmonary embolism Father      Factor V Leiden deficiency Brother       Past Surgical History:   Procedure Laterality Date     COLON SURGERY      bowel resection may 2019     COLONOSCOPY N/A 8/28/2015    Procedure: COLONOSCOPY with biopsies using forceps;  Surgeon: Hina Madison MD;  Location: [facility];  Service:      lap partial ileocolectomy  05/08/2019    [facility] for Crohn's disease     MO APPENDECTOMY      Description: Appendectomy;  Proc Date: 01/01/2005;     MO REMOVAL OF TONSILS,<13 Y/O      Description: Tonsillectomy;  Recorded: 09/04/2013;  Comments: childhood     MO SHLDR ARTHROSCOP,DIAGNOSTIC Left x2    Description: Arthroscopy Shoulder Left;  Proc Date: 01/01/2000;  Comments: labral repair by Dr Hector     SHOULDER SURGERY Right     Social History     Socioeconomic History     Marital status:      Spouse name: Val     Number of children: Not on file     Years of education: Not on file     Highest education level: Not on file   Occupational History     Not on file   Social Needs     Financial resource strain: Not on file     Food insecurity:     Worry: Not on file     Inability: Not on file     Transportation needs:     Medical: Not on file     Non-medical: Not on file   Tobacco Use     Smoking status: Never Smoker     Smokeless tobacco: Former User     Types: Chew     Tobacco comment: No exposure   Substance and Sexual Activity     Alcohol use: Yes     Alcohol/week: 2.0 standard drinks     Types: 2 Standard drinks or equivalent per week     Drug use: No     Comment: 2 drinks per week     Sexual activity: Never   Lifestyle     Physical activity:     Days per week: Not on file     Minutes per session: Not on file     Stress: Not on file   Relationships     Social  connections:     Talks on phone: Not on file     Gets together: Not on file     Attends [employer] service: Not on file     Active member of club or organization: Not on file     Attends meetings of clubs or organizations: Not on file     Relationship status: Not on file     Intimate partner violence:     Fear of current or ex partner: Not on file     Emotionally abused: Not on file     Physically abused: Not on file     Forced sexual activity: Not on file   Other Topics Concern     Not on file   Social History Narrative     Not on file        Allergies    No Known Allergies    Review of Systems    General  General (WDL): Exceptions to WDL  ENT  ENT (WDL): Exceptions to WDL  Glasses or Contacts: Yes - Chronic (Greater than 3 months)  Respiratory  Respiratory (WDL): All respiratory elements are within defined limits  Cardiovascular  Cardiovascular (WDL): All cardiovascular elements are within defined limits  Endocrine  Endocrine (WDL): All endocrine elements are within defined limits  Gastrointestinal  Gastrointestinal (WDL): Exceptions to WDL  Diarrhea: Yes - Chronic (Greater than 3 months)  Musculoskeletal  Musculoskeletal (WDL): Exceptions to WDL  Range of Motion Limitation: Yes - Chronic (Greater than 3 months)  Joint pain: Yes - Chronic (Greater than 3 months)  Back Pain: Yes - Chronic (Greater than 3 months)  Muscle pain or stiffness: Yes - Chronic (Greater than 3 months)  Neurological  Neurological (WDL): All neurological elements are within defined limits  Dominant Hand: Right  Psychological/Emotional  Psychological/Emotional (WDL): All psychological/emotional elements are within defined limits  Hematological/Lymphatic  Hematological/Lymphatic (WDL): All hematological/lymphatic elements are within defined limits  Dermatological  Dermatologic (WDL): All dermatological elements are within defined limits  Genitourinary/Reproductive  Genitourinary/Reproductive (WDL): All genitourinary/reproductive elements are  "within defined limits  Reproductive (Females only)     Pain  Currently in Pain: No/denies    Physical Exam    Recent Vitals 10/10/2019   Height 6' 2\"   Weight 324 lbs   BSA (m2) 2.77 m2   /104   Pulse 90   Temp 98   Temp src 1   SpO2 99   Some recent data might be hidden     General: alert, awake, not in acute distress.  Obese man  HEENT: Head: Normal, normocephalic, atraumatic.  Eye: Normal external eye, conjunctiva, lids cornea, TALIA.  Ears:  Non-tender.  Nose: Normal external nose, mucus membranes and septum.  Pharynx: Dental Hygiene adequate. Normal buccal mucosa. Normal pharynx.  Neck / Thyroid: Supple, no masses, nodes, nodules or enlargement.  Lymphatics: No abnormally enlarged lymph nodes.  Chest: Normal chest wall and respirations. Clear to auscultation.  Heart: S1 S2 RRR, no murmur.   Abdomen: abdomen is soft without significant tenderness, masses, organomegaly or guarding  Extremities: normal strength, tone, and muscle mass  Skin: normal. no rash or abnormalities  CNS: non focal.    Lab Results    Recent Results (from the past 168 hour(s))   Phospholipid Ab (Cardiolip) IgM/IgG   Result Value Ref Range    Cardiolipin Sabi IgG <1.6 0.0 - 19.9 GPL-U/mL    Cardiolipin Sabi IgM 1.2 0.0 - 19.9 MPL-U/mL       Imaging Results    No results found.      Signed by: Hipolito Barahona MD  "

## 2021-06-03 VITALS
OXYGEN SATURATION: 97 % | BODY MASS INDEX: 42.35 KG/M2 | DIASTOLIC BLOOD PRESSURE: 98 MMHG | WEIGHT: 315 LBS | SYSTOLIC BLOOD PRESSURE: 148 MMHG | HEART RATE: 77 BPM

## 2021-06-03 VITALS
HEART RATE: 82 BPM | OXYGEN SATURATION: 99 % | WEIGHT: 315 LBS | SYSTOLIC BLOOD PRESSURE: 144 MMHG | BODY MASS INDEX: 40.43 KG/M2 | DIASTOLIC BLOOD PRESSURE: 92 MMHG | HEIGHT: 74 IN

## 2021-06-03 VITALS
OXYGEN SATURATION: 99 % | SYSTOLIC BLOOD PRESSURE: 179 MMHG | BODY MASS INDEX: 40.43 KG/M2 | WEIGHT: 315 LBS | HEART RATE: 90 BPM | HEIGHT: 74 IN | TEMPERATURE: 98 F | DIASTOLIC BLOOD PRESSURE: 104 MMHG

## 2021-06-03 VITALS — HEIGHT: 73 IN | WEIGHT: 312 LBS | BODY MASS INDEX: 41.35 KG/M2

## 2021-06-03 NOTE — PROGRESS NOTES
Assessment:  1.  Hypertension, not at goal.  2.  Chronic back and neck pain.  Question of whether or not he could have ankylosing spondylitis.  3.  Known definite osteoarthritis in degenerative disc disease both in lumbar spine and cervical spine.  4.  Underlying Crohn's disease.    Plan: Continue amlodipine 5 mg daily.  Start losartan 50 mg-1 p.o. daily-#30 refillable x2.  Follow-up in 2 months regarding hypertension.  Continue efforts at weight reduction with healthy diet, reducing calories, regular physical exercise, low-sodium diet etc.  Then explained to him that the diagnosis of ankylosing spondylitis can be difficult at times and I recommend that we refer him to rheumatology for evaluation of what he is already had done in the way of testing and consideration of any potential further testing in determining this issue of whether or not he could have ankylosing spondylitis.  He understands that his past MRIs have definitely shown osteoarthritis and degenerative disc disease.  He understands that he has not tolerated some of the medicines that are used for ankylosing spondylitis as well as the Crohn's disease and continues to follow with [facility] for his Crohn's management.  Reviewed risks and benefits of the losartan medication as opposed to the option of raising the dose of amlodipine to 10 mg etc.    Subjective: 34-year-old male presenting for follow-up on hypertension and then requesting evaluation regarding potential ankylosing spondylitis.  Regarding hypertension is been taking the amlodipine 5 mg, he denies any headaches or dizziness or leg swelling.  He continues to try to follow diet etc.  He notes that they recently moved to a different location and he requests prescriptions being sent now to the [facility] in [place].  He notes he had a blood pressure he thinks that was 129/82 at [facility] on Friday and then he saw a new chiropractor on Monday and his blood pressure there was  135/85.  He states the chiropractor said he should consider getting evaluated for possible ankylosing spondylitis because of his persisting back and neck pain etc.  He has had the back pain for more than 5 years.  He has the underlying Crohn's disease.  Past Surgical History:   Procedure Laterality Date     COLON SURGERY      bowel resection may 2019     COLONOSCOPY N/A 8/28/2015    Procedure: COLONOSCOPY with biopsies using forceps;  Surgeon: Hina Madison MD;  Location: [facility];  Service:      lap partial ileocolectomy  05/08/2019    [facility] for Crohn's disease     IN APPENDECTOMY      Description: Appendectomy;  Proc Date: 01/01/2005;     IN REMOVAL OF TONSILS,<9 Y/O      Description: Tonsillectomy;  Recorded: 09/04/2013;  Comments: childhood     IN SHLDR ARTHROSCOP,DIAGNOSTIC Left x2    Description: Arthroscopy Shoulder Left;  Proc Date: 01/01/2000;  Comments: labral repair by Dr Hector     SHOULDER SURGERY Right    No Known Allergies  Past Medical History:   Diagnosis Date     Chronic back pain      Chronic neck pain      Crohn's disease (H)      Eczema     mild, as a child     Hypertension      Shingles      Patient Active Problem List   Diagnosis     Allergic Rhinitis     Localized Primary Osteoarthritis Of The Right Shoulder Glenohumeral Joint     Lower Back Pain     Neck Pain     Cervical Radiculopathy     Lumbar radiculopathy     Crohn's colitis (H)     Anemia     Osteoarthritis of lumbar spine     Osteoarthritis cervical spine     Family history of pulmonary embolism     Current Outpatient Medications on File Prior to Visit   Medication Sig Dispense Refill     calcium, as carbonate, (OS-FAUSTINO) 500 mg calcium (1,250 mg) tablet Take 1 tablet (500 mg total) by mouth 2 (two) times a day.  0     folic acid (FOLVITE) 1 MG tablet Take 1,000 mcg by mouth daily.  11     multivitamin with minerals (THERA-M) 9 mg iron-400 mcg Tab tablet Take 1 tablet by mouth daily as needed.         "STELARA 90 mg/mL Syrg        [DISCONTINUED] amLODIPine (NORVASC) 5 MG tablet Take 1 tablet (5 mg total) by mouth daily. 30 tablet 2     methotrexate, PF, 25 mg/mL Soln injection 25 mg.       No current facility-administered medications on file prior to visit.      All other review of systems are negative for any other changes.    Objective:BP (!) 144/92 (Patient Site: Right Arm, Patient Position: Sitting, Cuff Size: Adult Large)   Pulse 82   Ht 6' 2\" (1.88 m)   Wt (!) 328 lb (148.8 kg)   SpO2 99%   BMI 42.11 kg/m    HEENT examination shows no acute change.  Neck supple without adenopathy or thyromegaly.  Lungs clear.  Heart regular rate and rhythm without murmur.  Abdomen shows no masses tenderness or paraspinal megaly.  No pitting edema at the ankle.  He is alert with clear speech.    Reviewed the MRI report from May 2017 which showed extensive osteoarthritis and degenerative disks, no comment was made in that scan about any sacroiliitis.  I do not see evidence he has had HLA-B27 checked etc.  "

## 2021-06-04 VITALS
BODY MASS INDEX: 44.55 KG/M2 | SYSTOLIC BLOOD PRESSURE: 138 MMHG | HEART RATE: 100 BPM | DIASTOLIC BLOOD PRESSURE: 96 MMHG | WEIGHT: 315 LBS

## 2021-06-04 VITALS
HEIGHT: 74 IN | SYSTOLIC BLOOD PRESSURE: 134 MMHG | WEIGHT: 315 LBS | DIASTOLIC BLOOD PRESSURE: 82 MMHG | BODY MASS INDEX: 40.43 KG/M2

## 2021-06-04 VITALS
DIASTOLIC BLOOD PRESSURE: 80 MMHG | BODY MASS INDEX: 44.81 KG/M2 | SYSTOLIC BLOOD PRESSURE: 132 MMHG | OXYGEN SATURATION: 98 % | HEART RATE: 105 BPM | WEIGHT: 315 LBS

## 2021-06-04 VITALS
DIASTOLIC BLOOD PRESSURE: 82 MMHG | BODY MASS INDEX: 40.43 KG/M2 | HEIGHT: 74 IN | WEIGHT: 315 LBS | SYSTOLIC BLOOD PRESSURE: 138 MMHG

## 2021-06-05 NOTE — PROGRESS NOTES
ASSESSMENT AND PLAN:  Jose Zuniga 34 y.o. male is seen here on 01/13/20 for evaluation of neck and back pain.  More of stiffness and pain in the neck, which is more troublesome symptom for him, background of Crohn's on Stelara, methotrexate, and well-documented degenerative changes, lumbar is spine, and less so in the cervical spine.  We discussed how 1 of the key question here is going to be if his symptoms relate to an inflammatory bowel process of the spine given the background of Crohn's.  Further work-up as noted.  Once these data are available we will get together to chart the next step.  Diagnoses and all orders for this visit:    Neck Pain  -     MR Cervical Spine With Without Contrast; Future; Expected date: 01/13/2020  -     HLA-B27 Antigen  -     XR Sacroliac Joints 3 Or More VWS; Future; Expected date: 01/13/2020  -     XR Sacroliac Joints 3 Or More VWS  -     HM1(CBC and Differential)  -     Creatinine  -     ALT (SGPT)  -     Albumin  -     Rheumatoid Factor Quant  -     CCP Antibodies  -     Uric Acid  -     Erythrocyte Sedimentation Rate  -     C-Reactive Protein  -     Antinuclear Antibody (KARLA) Cascade  -     HM1 (CBC with Diff)    Crohn's disease of colon with other complication (H)  -     MR Cervical Spine With Without Contrast; Future; Expected date: 01/13/2020  -     HLA-B27 Antigen  -     XR Sacroliac Joints 3 Or More VWS; Future; Expected date: 01/13/2020  -     XR Sacroliac Joints 3 Or More VWS  -     HM1(CBC and Differential)  -     Creatinine  -     ALT (SGPT)  -     Albumin  -     Rheumatoid Factor Quant  -     CCP Antibodies  -     Uric Acid  -     Erythrocyte Sedimentation Rate  -     C-Reactive Protein  -     Antinuclear Antibody (KARLA) Cascade  -     HM1 (CBC with Diff)    Chronic bilateral low back pain without sciatica  -     HLA-B27 Antigen  -     XR Sacroliac Joints 3 Or More VWS; Future; Expected date: 01/13/2020  -     XR Sacroliac Joints 3 Or More VWS  -     HM1(CBC and  Differential)  -     Creatinine  -     ALT (SGPT)  -     Albumin  -     Rheumatoid Factor Quant  -     CCP Antibodies  -     Uric Acid  -     Erythrocyte Sedimentation Rate  -     C-Reactive Protein  -     Antinuclear Antibody (KARLA) Cascade  -     HM1 (CBC with Diff)      HISTORY OF PRESENTING ILLNESS:  Jose Zuniga, 34 y.o., male is here for evaluation of pain.  This is in his neck and lower back.  More of an issue for him in the neck than the back.  The neck pain is associated with stiffness.  In the morning it takes about 45 minutes or so before he limbers up.  He has noted some limitation in the range of motion such as impairing his ability to watch for blind spot.  He has not had recent trauma.  He used to play various sports including football and past hockey and baseball.  He has had surgeries on his shoulders left side was labral tear which was an open surgery the right shoulder rotator cuff.  These surgeries were done in early to mid 2010/2016.  For the Crohn's initially he was started on Remicade that worked very well, gradually the duration between infusions became shorter and shorter, change to Humira same have been there.  Each 1 of those lasted for about 2 years.  Now for 12+ months he has been on Stelara.  Finding it helpful.  He is on subcutaneous weekly methotrexate.  He has not had sulfasalazine.  He noted that while he was on Humira and Remicade his neck and back pain were less troublesome than when when he is now on Stelara..  He has not been able to take nonsteroidals because of Crohn's.  He has not observed swelling in the fingers or toes suggestive of dactylitis.  He has not had features suggestive of uveitis.  There is no family history of rheumatoid arthritis ankylosing spondylitis or lupus.  He reports no personal or family history of psoriasis.  He is not a smoker alcohol 1-2 drinks per week.  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  He has had multiple surgeries  as noted including right knee torn meniscus repair, 2005 2000 left shoulder labral repair, 2015 rotator cuff on the right side..    .  Further historical information and ADL limitations as noted in the multidimensional health assessment questionnaire attached in the EMR. Rest of the 13 system ROS is negative.     ALLERGIES:Patient has no known allergies.    PAST MEDICAL/ACTIVE PROBLEMS/MEDICATION/ FAMILY HISTORY/SOCIAL DATA:  The patient has a family history of  Past Medical History:   Diagnosis Date     Chronic back pain      Chronic neck pain      Crohn's disease (H)      Eczema     mild, as a child     Hypertension      Shingles      Social History     Tobacco Use   Smoking Status Never Smoker   Smokeless Tobacco Former User     Types: Chew   Tobacco Comment    No exposure     Patient Active Problem List   Diagnosis     Allergic Rhinitis     Localized Primary Osteoarthritis Of The Right Shoulder Glenohumeral Joint     Lower Back Pain     Neck Pain     Cervical Radiculopathy     Lumbar radiculopathy     Crohn's colitis (H)     Anemia     Osteoarthritis of lumbar spine     Osteoarthritis cervical spine     Family history of pulmonary embolism     Current Outpatient Medications   Medication Sig Dispense Refill     amLODIPine (NORVASC) 5 MG tablet Take 1 tablet (5 mg total) by mouth daily. 30 tablet 2     calcium, as carbonate, (OS-FAUSTINO) 500 mg calcium (1,250 mg) tablet Take 1 tablet (500 mg total) by mouth 2 (two) times a day.  0     folic acid (FOLVITE) 1 MG tablet Take 1,000 mcg by mouth daily.  11     losartan (COZAAR) 50 MG tablet Take 1 tablet (50 mg total) by mouth daily. 30 tablet 2     methotrexate, PF, 25 mg/mL Soln injection 25 mg.       multivitamin with minerals (THERA-M) 9 mg iron-400 mcg Tab tablet Take 1 tablet by mouth daily as needed.        STELARA 90 mg/mL Syrg        No current facility-administered medications for this visit.        COMPREHENSIVE EXAMINATION:  Vitals:    01/13/20 0902   BP:  "134/82   Patient Site: Right Arm   Patient Position: Sitting   Cuff Size: Adult Regular   Weight: (!) 328 lb (148.8 kg)   Height: 6' 2\" (1.88 m)     A well appearing alert oriented male. Vital data as noted above. His eyes without inflammation/scleromalacia. ENTwithout oral mucositis, thrush, nasal deformity, external ear redness, deformity. His neck is without lymphadenopathy and supple. Lungs normal sounds, no pleural rub. Heart auscultation normal rate, rhythm; no pericardial rub and murmurs. Abdomen soft, non tender, no organomegaly. Skin examined for heliotrope, malar area eruption, lupus pernio, periungual erythema, sclerodactyly, papules, erythema nodosum, purpura, nail pitting, onycholysis, and obvious psoriasis lesion. Neurological examination shows normal alertness, speech, facial symmetry, tone and power in upper and lower extremities. The joint examination is performed for swelling, tenderness, warmth, erythema, and range of motion in the following joints: DIPs, PIPs, MCPs, wrists, first CMC's, elbows, shoulders, hips, knees, ankles, feet; spine for range of motion and paraspinal muscles for tenderness. The salient  findings are: He does not have dactylitis of digits or toes none of the palpable joints of upper and lower extremities show evidence of inflammatory changes.  He does not have enthesitis around the knees, Achilles areas.  Occipital wall is 4 inches, flexion of the lumbar spine is reduced.  Nails are without pitting.    LAB / IMAGING DATA:  ALT   Date Value Ref Range Status   04/22/2019 13 0 - 45 U/L Final   10/19/2018 33 0 - 45 U/L Final   08/26/2015 16 0 - 45 U/L Final     Albumin   Date Value Ref Range Status   04/22/2019 3.1 (L) 3.5 - 5.0 g/dL Final   10/22/2018 2.7 (L) 3.5 - 5.0 g/dL Final   10/21/2018 2.7 (L) 3.5 - 5.0 g/dL Final     Creatinine   Date Value Ref Range Status   08/22/2019 0.83 0.70 - 1.30 mg/dL Final   04/22/2019 0.83 0.70 - 1.30 mg/dL Final   10/22/2018 0.85 0.70 - 1.30 " mg/dL Final       WBC   Date Value Ref Range Status   04/22/2019 5.5 4.0 - 11.0 thou/uL Final   10/22/2018 8.2 4.0 - 11.0 thou/uL Final   08/28/2015 10.6 4.0 - 11.0 thou/uL Final   08/27/2015 11.5 (H) 4.0 - 11.0 thou/uL Final     Hemoglobin   Date Value Ref Range Status   04/22/2019 13.2 (L) 14.0 - 18.0 g/dL Final   10/22/2018 13.3 (L) 14.0 - 18.0 g/dL Final   10/21/2018 12.9 (L) 14.0 - 18.0 g/dL Final     Platelets   Date Value Ref Range Status   04/22/2019 415 140 - 440 thou/uL Final   10/22/2018 305 140 - 440 thou/uL Final   10/21/2018 271 140 - 440 thou/uL Final       Lab Results   Component Value Date    SEDRATE 8 10/21/2018

## 2021-06-05 NOTE — TELEPHONE ENCOUNTER
"Patient Returning Call  Reason for call:  results  Information relayed to patient:  The writer read the following to patient per Dr Lewis: Kidney function normal, borderline low HDL i.e. good cholesterol remainder of lipids are fine.  Regular exercise can raise the HDL cholesterol a few points.  Plan to recheck fasting lipids once a year.  Will fax the results for your biometric profile form. The writer read the following to patient per [facility]: also biometric form completed & faxed. Copy made for his chart & original mailed to him. \"Thank you.\"  Patient has additional questions:  No  If YES, what are your questions/concerns:  NA  Okay to leave a detailed message?: No      "

## 2021-06-05 NOTE — TELEPHONE ENCOUNTER
Left message to call back for: pt  Information to relay to patient:  See below- also biometric form completed & faxed. Copy made for his chart & original mailed to him   0 = swallows foods/liquids without difficulty

## 2021-06-05 NOTE — PROGRESS NOTES
Assessment:  1.  Hypertension controlled.  2.  Screen lipids.    Plan: Check basic metabolic profile and total, HDL, and LDL cholesterol since he is not fasting this afternoon, and that meets criteria for his biometric profile.  Will finish filling out the form and information is available and then can fax it as requested on the form.  At minimum follow-up within 6 months for the hypertension recheck.  He will likely follow-up in early June because of timing with their other issues and when his wife is due to deliver etc.  He will need to get a Tdap immunization when she is in her third trimester.  He will continue to work at gradual weight reduction through healthy diet and regular activity and he has found the elliptical to be better for him.    Subjective: 34-year-old male presenting for follow-up on the above and needing a biometric form filled out for his work insurance.  He notes he recently saw Dr. Mix and is in the process of getting a number of pieces of testing done and he follows up with him then in mid February.  He is taking the amlodipine 5 mg daily and the losartan 50 mg daily and is tolerated that fine.  No headaches or dizziness or leg swelling.  Patient Active Problem List   Diagnosis     Allergic Rhinitis     Localized Primary Osteoarthritis Of The Right Shoulder Glenohumeral Joint     Lower Back Pain     Neck Pain     Cervical Radiculopathy     Lumbar radiculopathy     Crohn's colitis (H)     Anemia     Osteoarthritis of lumbar spine     Osteoarthritis cervical spine     Family history of pulmonary embolism     Past Medical History:   Diagnosis Date     Chronic back pain      Chronic neck pain      Crohn's disease (H)      Eczema     mild, as a child     Hypertension      Shingles      No Known Allergies  Current Outpatient Medications   Medication Sig Dispense Refill     amLODIPine (NORVASC) 5 MG tablet Take 1 tablet (5 mg total) by mouth daily. 30 tablet 5     calcium, as carbonate, (OS-FAUSTINO)  500 mg calcium (1,250 mg) tablet Take 1 tablet (500 mg total) by mouth 2 (two) times a day.  0     folic acid (FOLVITE) 1 MG tablet Take 1,000 mcg by mouth daily.  11     losartan (COZAAR) 50 MG tablet Take 1 tablet (50 mg total) by mouth daily. 30 tablet 5     methotrexate, PF, 25 mg/mL Soln injection 25 mg.       multivitamin with minerals (THERA-M) 9 mg iron-400 mcg Tab tablet Take 1 tablet by mouth daily as needed.        STELARA 90 mg/mL Syrg        No current facility-administered medications for this visit.      He does not smoke and little alcohol.  All other review of systems negative.    Objective:/80   Pulse (!) 105   Wt (!) 349 lb (158.3 kg)   SpO2 98%   BMI 44.81 kg/m    HEENT is negative.  Neck supple.  Lungs clear.  Heart regular rate and rhythm.  Abdomen nontender.  Waist circumference is 57 inches.  No pitting edema at the ankle.  Lab pending.

## 2021-06-05 NOTE — TELEPHONE ENCOUNTER
----- Message from Euseibo Lewis MD sent at 1/29/2020  7:06 AM CST -----  Kidney function normal, borderline low HDL i.e. good cholesterol remainder of lipids are fine.  Regular exercise can raise the HDL cholesterol a few points.  Plan to recheck fasting lipids once a year.  Will fax the results for your biometric profile form.

## 2021-06-06 NOTE — PROGRESS NOTES
ASSESSMENT AND PLAN:  Jose Zuniga 35 y.o. male is seen here on 02/17/20 for follow-up again, he has Crohn's disease on Stelara, methotrexate has sacroiliac disease with ankylosis, he had neck pain and the question of if there is inflammatory component it appears that more of a degenerative process, he had MRI, and other work-up including labs sed rate CRP which along with his clinical features suggest the degenerative process more likely the cause of his neck pain.  We discussed options.  He is going to try duloxetine literature provided.  He is aware that his current immunomodulators would have very little if any benefit for the degenerative related pain.  We will meet here in 3 months he will call back here in 6weeks if he has tolerated duloxetine and noted some improvement he could increase the dose at that point to twice daily.    Diagnoses and all orders for this visit:    Spondylosis of cervical region without myelopathy or radiculopathy  -     DULoxetine (CYMBALTA) 30 MG capsule; Take 1 capsule (30 mg total) by mouth daily.  Dispense: 30 capsule; Refill: 1    Crohn's disease of colon with other complication (H)    Ankylosis of sacroiliac joint      HISTORY OF PRESENTING ILLNESS:  Jose Zuniga, 35 y.o., male is here for follow-up of pain, predominantly neck and lower back.  He is known to have Crohn's disease on Stelara, methotrexate from gastroenterology.  His work-up has shown sacroiliac ankylosis.  Cervical degenerative joint disease disc disease.  On balance it would appear that his symptoms and especially in the neck are more related to degenerative rather than inflammatory spondylitis this is discussed with him.  In view of his comorbidities he is not a candidate for nonsteroidals.  As noted before his pain is in his neck and lower back.  More of an issue for him in the neck than the back.  The neck pain is associated with stiffness.  In the morning it takes about 45 minutes or so before he limbers  up.  He has noted some limitation in the range of motion such as impairing his ability to watch for blind spot.  He has not had recent trauma.  He used to play various sports including football and past hockey and baseball.  He has had surgeries on his shoulders left side was labral tear which was an open surgery the right shoulder rotator cuff.  These surgeries were done in early to mid 2010/2016.  For the Crohn's initially he was started on Remicade that worked very well, gradually the duration between infusions became shorter and shorter, change to Humira same have been there.  Each 1 of those lasted for about 2 years.  Now for 12+ months he has been on Stelara.  Finding it helpful.  He is on subcutaneous weekly methotrexate.  He has not had sulfasalazine.  He noted that while he was on Humira and Remicade his neck and back pain were less troublesome than when when he is now on Stelara..  He has not been able to take nonsteroidals because of Crohn's.  He has not observed swelling in the fingers or toes suggestive of dactylitis.  He has not had features suggestive of uveitis.  There is no family history of rheumatoid arthritis ankylosing spondylitis or lupus.  He reports no personal or family history of psoriasis.  He is not a smoker alcohol 1-2 drinks per week.  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  He has had multiple surgeries as noted including right knee torn meniscus repair, 2005 2000 left shoulder labral repair, 2015 rotator cuff on the right side..    .  Further historical information and ADL limitations as noted in the multidimensional health assessment questionnaire attached in the EMR.  ALLERGIES:Patient has no known allergies.    PAST MEDICAL/ACTIVE PROBLEMS/MEDICATION/ FAMILY HISTORY/SOCIAL DATA:  The patient has a family history of  Past Medical History:   Diagnosis Date     Chronic back pain      Chronic neck pain      Crohn's disease (H)      Eczema     mild, as a child  "    Hypertension      Shingles      Social History     Tobacco Use   Smoking Status Never Smoker   Smokeless Tobacco Former User     Types: Chew   Tobacco Comment    No exposure     Patient Active Problem List   Diagnosis     Allergic Rhinitis     Localized Primary Osteoarthritis Of The Right Shoulder Glenohumeral Joint     Lower Back Pain     Neck Pain     Cervical Radiculopathy     Lumbar radiculopathy     Crohn's colitis (H)     Anemia     Osteoarthritis of lumbar spine     Osteoarthritis cervical spine     Family history of pulmonary embolism     Current Outpatient Medications   Medication Sig Dispense Refill     amLODIPine (NORVASC) 5 MG tablet Take 1 tablet (5 mg total) by mouth daily. 30 tablet 5     calcium, as carbonate, (OS-FAUSTINO) 500 mg calcium (1,250 mg) tablet Take 1 tablet (500 mg total) by mouth 2 (two) times a day.  0     folic acid (FOLVITE) 1 MG tablet Take 1,000 mcg by mouth daily.  11     losartan (COZAAR) 50 MG tablet Take 1 tablet (50 mg total) by mouth daily. 30 tablet 5     methotrexate, PF, 25 mg/mL Soln injection 25 mg.       multivitamin with minerals (THERA-M) 9 mg iron-400 mcg Tab tablet Take 1 tablet by mouth daily as needed.        STELARA 90 mg/mL Syrg        No current facility-administered medications for this visit.      DETAILED EXAMINATION  02/17/20  :  Vitals:    02/17/20 1626   BP: 138/82   Patient Site: Right Arm   Patient Position: Sitting   Cuff Size: Adult Large   Weight: (!) 349 lb (158.3 kg)   Height: 6' 2\" (1.88 m)     Alert oriented. Head including the face is examined for malar rash, heliotropes, scarring, lupus pernio. Eyes examined for redness such as in episcleritis/scleritis, periorbital lesions.   Neck/ Face examined for parotid gland swelling, range of motion of neck.  Left upper and lower and right upper and lower extremities examined for tenderness, swelling, warmth of the appendicular joints, range of motion, edema, rash.  Some of the important findings " included: he does not have evidence of synovitis in the palpable joints of the upper extremities.  No significant deformities of the digits.  no Heberden nodes.  Range of motion of the shoulders show full abduction.  No JLT effusion or warmth of the knees.    He does not have enthesitis around the knees, Achilles areas.  Occipital wall is 4 inches, flexion of the lumbar spine is reduced.  Nails are without pitting.    LAB / IMAGING DATA:  ALT   Date Value Ref Range Status   01/13/2020 49 (H) 0 - 45 U/L Final   04/22/2019 13 0 - 45 U/L Final   10/19/2018 33 0 - 45 U/L Final     Albumin   Date Value Ref Range Status   01/13/2020 3.5 3.5 - 5.0 g/dL Final   04/22/2019 3.1 (L) 3.5 - 5.0 g/dL Final   10/22/2018 2.7 (L) 3.5 - 5.0 g/dL Final     Creatinine   Date Value Ref Range Status   01/27/2020 0.65 (L) 0.70 - 1.30 mg/dL Final   01/13/2020 0.87 0.70 - 1.30 mg/dL Final   08/22/2019 0.83 0.70 - 1.30 mg/dL Final       WBC   Date Value Ref Range Status   01/13/2020 6.2 4.0 - 11.0 thou/uL Final   04/22/2019 5.5 4.0 - 11.0 thou/uL Final   08/28/2015 10.6 4.0 - 11.0 thou/uL Final   08/27/2015 11.5 (H) 4.0 - 11.0 thou/uL Final     Hemoglobin   Date Value Ref Range Status   01/13/2020 13.7 (L) 14.0 - 18.0 g/dL Final   04/22/2019 13.2 (L) 14.0 - 18.0 g/dL Final   10/22/2018 13.3 (L) 14.0 - 18.0 g/dL Final     Platelets   Date Value Ref Range Status   01/13/2020 307 140 - 440 thou/uL Final   04/22/2019 415 140 - 440 thou/uL Final   10/22/2018 305 140 - 440 thou/uL Final       Lab Results   Component Value Date    RF <15.0 01/13/2020    SEDRATE 10 01/13/2020

## 2021-06-08 NOTE — TELEPHONE ENCOUNTER
Called pharmacy to verify patient's insurance and they stated PA is not needed they have a paid claim on 05/19.

## 2021-06-08 NOTE — PROGRESS NOTES
Assessment:  1.  Hypertension, not at goal.  2.  Significant situational stress.  3.  Underlying Crohn's colitis well controlled.  4.  Ankylosing spondylitis.    Plan: Recommend increasing losartan to 100 mg-1 p.o. daily-#90 refillable x1.  Continue amlodipine 5 mg daily.  Check basic metabolic profile today.  Continue his other current medication as he is doing.  He will check his blood pressures at home and let us know in 2 to 3 weeks how his readings are doing.  Then at a minimum plan follow-up by November and get established with new clinician here.  Discussed the alternatives with him.  Because he and his wife are building a home in [place], they are both considering getting [place] physicians [place].  I did explain that I was retiring in the early part of August.  Spent 25 minutes release 50% in counseling.    Subjective: 35-year-old male presenting for follow-up primarily on hypertension but having multiple issues.  His wife is due in early July, is now about 34 weeks gestation, but was recently hospitalized with possible preeclampsia and she was discharged home now and they are seeing how she is doing.  They are building a new home in [place], but then had significant issues because of the COVID-19 pandemic causing his wife to be furloughed and his income to go down etc. affecting their mortgage application.  He did just have his colonoscopy that looked fairly good and is followed by [facility] for his Crohn's disease.  He sees Dr. Mix, rheumatology regarding the ankylosing spondylitis.  He is trying to exercise on a regular basis and doing biking.  He was doing swimming until the gyms had to close because of the COVID-19 pandemic.  He notes that when his back flares up he is able to use a hot tub currently where they are living.  He denies any headaches or dizziness or leg swelling.  He notes that he feels like his blood pressure is higher today than  some of the days at home.  His blood pressure on Sunday night at home was 127/82.  That level is about the best that he has had.  He notes he continues to have some intermittent pain then in the back and the neck with his known osteoarthritis.  Patient Active Problem List   Diagnosis     Allergic Rhinitis     Localized Primary Osteoarthritis Of The Right Shoulder Glenohumeral Joint     Lower Back Pain     Neck Pain     Cervical Radiculopathy     Lumbar radiculopathy     Crohn's colitis (H)     Anemia     Osteoarthritis of lumbar spine     Osteoarthritis cervical spine     Family history of pulmonary embolism     Ankylosis of sacroiliac joint     Benign essential hypertension     Past Medical History:   Diagnosis Date     Chronic back pain      Chronic neck pain      Crohn's disease (H)      Eczema     mild, as a child     Hypertension      Shingles      No Known Allergies  Current Outpatient Medications on File Prior to Visit   Medication Sig Dispense Refill     amLODIPine (NORVASC) 5 MG tablet Take 1 tablet (5 mg total) by mouth daily. 30 tablet 5     calcium, as carbonate, (OS-FAUSTINO) 500 mg calcium (1,250 mg) tablet Take 1 tablet (500 mg total) by mouth 2 (two) times a day.  0     DULoxetine (CYMBALTA) 30 MG capsule Take 1 capsule (30 mg total) by mouth 2 (two) times a day. 180 capsule 0     folic acid (FOLVITE) 1 MG tablet Take 1,000 mcg by mouth daily.  11     methotrexate 2.5 MG tablet Take 2.5 tablets by mouth once a week. Take 5 tabs once a week       multivitamin with minerals (THERA-M) 9 mg iron-400 mcg Tab tablet Take 1 tablet by mouth daily as needed.        STELARA 90 mg/mL Syrg        [DISCONTINUED] losartan (COZAAR) 50 MG tablet Take 1 tablet (50 mg total) by mouth daily. 30 tablet 5     No current facility-administered medications on file prior to visit.      He does not smoke.  Little alcohol.  He notes that with the recent stress he has had some stress eating.  He continues to try to follow a low-salt  diet and continues to try to work at gradual weight reduction.    Objective:BP (!) 138/96 (Patient Site: Left Arm, Patient Position: Sitting, Cuff Size: Adult Large)   Pulse 100   Wt (!) 347 lb (157.4 kg)   BMI 44.55 kg/m    HEENT examination is negative.  Neck supple without adenopathy.  Lungs clear.  Heart regular rate and rhythm without murmur.  Abdomen shows no masses tenderness or hepatosplenomegaly.  No pedal edema.  He is alert with clear speech.

## 2021-06-08 NOTE — PROGRESS NOTES
"Jose Zuniga is a 35 y.o. male who is being evaluated via a billable video visit.      The patient has been notified of following:     \"This video visit will be conducted via a call between you and your physician/provider. We have found that certain health care needs can be provided without the need for an in-person physical exam.  This service lets us provide the care you need with a video conversation.  If a prescription is necessary we can send it directly to your pharmacy.  If lab work is needed we can place an order for that and you can then stop by our lab to have the test done at a later time.    Video visits are billed at different rates depending on your insurance coverage. Please reach out to your insurance provider with any questions.    If during the course of the call the physician/provider feels a video visit is not appropriate, you will not be charged for this service.\"    Patient has given verbal consent to a Video visit? Yes    Patient would like to receive their AVS by AVS Preference: Chaitanya.    Patient would like the video invitation sent by: Text to cell phone: [phone]    Will anyone else be joining your video visit? No          Video-Visit Details    Type of service:  Video Visit    Originating Location (pt. Location): Home    Distant Location (provider location):  [facility]     [facility] Video Visit[facility]        ASSESSMENT AND PLAN:    Diagnoses and all orders for this visit:    Ankylosis of sacroiliac joint    Spondylosis of cervical region without myelopathy or radiculopathy  -     DULoxetine (CYMBALTA) 30 MG capsule; Take 1 capsule (30 mg total) by mouth 2 (two) times a day.  Dispense: 180 capsule; Refill: 0    Crohn's disease of colon with other complication (H)    Neck Pain          HISTORY OF PRESENTING ILLNESS:  Jose Zuniga 35 y.o. is evaluated here via video link.  He has ankylosing spondylitis causing ankylosis of sacroiliac joints,, he has spondylosis " of the C-spine, background of Crohn's on Stelara and methotrexate from his gastroenterologist..  He had residual pain on his previous visit.  He was started on duloxetine based on the variety of assessments leading to the opinion that his pain was more due to degenerative rather than an inflammatory process.  He noted improvement there is stiffness still there.  He noted the pain is worse with side-to-side movement around in flexion extension at the neck.  There is no definite radiation down the arms but does move toward the shoulders.  He has tolerated 30 mg of duloxetine.  No other joint areas affected.  No ocular symptoms.  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  They are going to quarantine intensively 3 weeks prior.  He is going to stay duloxetine but increase the dose to 30 mg twice daily.  We will meet here in 3 months or sooner.    ROS enquiry held for fever, ocular symptoms, rash, headache,  GI issues.  Today we also discussed the issues related to the current pandemic, the pros and cons of the current treatment plan, the CDC guidelines such as social distancing washing the hands covering the cough.  ALLERGIES:Patient has no known allergies.    PAST MEDICAL/ACTIVE PROBLEMS/MEDICATION/SOCIAL DATA  Past Medical History:   Diagnosis Date     Chronic back pain      Chronic neck pain      Crohn's disease (H)      Eczema     mild, as a child     Hypertension      Shingles      Social History     Tobacco Use   Smoking Status Never Smoker   Smokeless Tobacco Former User     Types: Chew   Tobacco Comment    No exposure     Patient Active Problem List   Diagnosis     Allergic Rhinitis     Localized Primary Osteoarthritis Of The Right Shoulder Glenohumeral Joint     Lower Back Pain     Neck Pain     Cervical Radiculopathy     Lumbar radiculopathy     Crohn's colitis (H)     Anemia     Osteoarthritis of lumbar spine     Osteoarthritis cervical spine     Family history of  pulmonary embolism     Ankylosis of sacroiliac joint     Current Outpatient Medications   Medication Sig Dispense Refill     amLODIPine (NORVASC) 5 MG tablet Take 1 tablet (5 mg total) by mouth daily. 30 tablet 5     calcium, as carbonate, (OS-FAUSTINO) 500 mg calcium (1,250 mg) tablet Take 1 tablet (500 mg total) by mouth 2 (two) times a day.  0     DULoxetine (CYMBALTA) 30 MG capsule Take 1 capsule (30 mg total) by mouth daily. 30 capsule 0     folic acid (FOLVITE) 1 MG tablet Take 1,000 mcg by mouth daily.  11     losartan (COZAAR) 50 MG tablet Take 1 tablet (50 mg total) by mouth daily. 30 tablet 5     methotrexate 2.5 MG tablet Take 2.5 tablets by mouth once a week. Take 5 tabs once a week       multivitamin with minerals (THERA-M) 9 mg iron-400 mcg Tab tablet Take 1 tablet by mouth daily as needed.        STELARA 90 mg/mL Syrg        No current facility-administered medications for this visit.          EXAMINATION:    Using the audio and video link as best as possible the constitutional, neck, neurologic, psych, skin, both upper extremities areas/organ system were evaluated during this assessment.  Some of the important findings: There is reduced range of neck with side to side movement, flexion extension are also limited.      LAB / IMAGING DATA:  ALT   Date Value Ref Range Status   01/13/2020 49 (H) 0 - 45 U/L Final   04/22/2019 13 0 - 45 U/L Final   10/19/2018 33 0 - 45 U/L Final     Albumin   Date Value Ref Range Status   01/13/2020 3.5 3.5 - 5.0 g/dL Final   04/22/2019 3.1 (L) 3.5 - 5.0 g/dL Final   10/22/2018 2.7 (L) 3.5 - 5.0 g/dL Final     Creatinine   Date Value Ref Range Status   01/27/2020 0.65 (L) 0.70 - 1.30 mg/dL Final   01/13/2020 0.87 0.70 - 1.30 mg/dL Final   08/22/2019 0.83 0.70 - 1.30 mg/dL Final       WBC   Date Value Ref Range Status   01/13/2020 6.2 4.0 - 11.0 thou/uL Final   04/22/2019 5.5 4.0 - 11.0 thou/uL Final   08/28/2015 10.6 4.0 - 11.0 thou/uL Final   08/27/2015 11.5 (H) 4.0 - 11.0  thou/uL Final     Hemoglobin   Date Value Ref Range Status   01/13/2020 13.7 (L) 14.0 - 18.0 g/dL Final   04/22/2019 13.2 (L) 14.0 - 18.0 g/dL Final   10/22/2018 13.3 (L) 14.0 - 18.0 g/dL Final     Platelets   Date Value Ref Range Status   01/13/2020 307 140 - 440 thou/uL Final   04/22/2019 415 140 - 440 thou/uL Final   10/22/2018 305 140 - 440 thou/uL Final       Lab Results   Component Value Date    RF <15.0 01/13/2020    SEDRATE 10 01/13/2020     Duration of the call:7  Minutes  Call start: 530  pm  Call end:   538pm

## 2021-06-09 NOTE — PROGRESS NOTES
Subjective:      Patient ID: Jose Zuniga is a 32 y.o. male.    Chief Complaint: coughx 2 weeks.    HPI Jose Zuniga is a 32 y.o. male who presents today complaining of productive cough and rattling with breathing 2 week. Patient is scheduled for remicade for the treatment of Chrone's this Friday and he can't recive the injection if he has any active infection. Patient denies any underlying lung disesase. His wife was recently dx with bronchitis. Patient reports that the cough is productive with lime green flem.         Past Medical History:   Diagnosis Date     Chronic back pain      Chronic neck pain      Eczema     mild, as a child     Shingles        Past Surgical History:   Procedure Laterality Date     COLONOSCOPY N/A 8/28/2015    Procedure: COLONOSCOPY with biopsies using forceps;  Surgeon: Hina Madison MD;  Location: [facility];  Service:      MN APPENDECTOMY      Description: Appendectomy;  Proc Date: 01/01/2005;     MN REMOVAL OF TONSILS,<11 Y/O      Description: Tonsillectomy;  Recorded: 09/04/2013;  Comments: childhood     MN SHLDR ARTHROSCOP,DIAGNOSTIC Left x2    Description: Arthroscopy Shoulder Left;  Proc Date: 01/01/2000;  Comments: labral repair by Dr Hector     SHOULDER SURGERY Right        Family History   Problem Relation Age of Onset     Colon cancer Mother      Heart disease Father      Kidney disease Father        Social History   Substance Use Topics     Smoking status: Never Smoker     Smokeless tobacco: Current User     Types: Chew      Comment: No exposure     Alcohol use Yes      Comment: rare/occasional       Review of Systems   Constitutional: Negative for chills and fever.   HENT: Positive for congestion. Negative for ear pain, sinus pressure and sore throat.    Respiratory: Positive for cough and shortness of breath. Negative for wheezing.    Neurological: Negative for headaches.       Objective:     Visit Vitals     /82     Pulse 92     Temp 98.6  F (37  C) (Oral)      Wt (!) 287 lb 11.2 oz (130.5 kg)     SpO2 98%     BMI 35.96 kg/m2       Physical Exam   Constitutional: He appears well-developed and well-nourished. No distress.   Cardiovascular: Normal rate, regular rhythm and normal heart sounds.    No murmur heard.  Pulmonary/Chest: Effort normal. No accessory muscle usage. No respiratory distress. He has no rales. He exhibits no tenderness.   Patients left lung sounds breathier, not a true wheeze. Lung sounds course.    Lymphadenopathy:     He has no cervical adenopathy.       Procedures      Assessment / Plan:     1. Bronchitis  predniSONE (DELTASONE) 20 MG tablet         Patient Instructions   1) Take prednisone 2 tablets daily for a total of 5 days.  2) Continue taking Nyquil if it's helping you to sleep  3) Return or seek medical attention if you develop difficulty breathing, chest pain, or your symptoms do not improve in 5 days.

## 2021-06-09 NOTE — TELEPHONE ENCOUNTER
Last Med Check: 5/27/20.    Next med check due on: ?    Future Appointment Scheduled ? No.     Last Med Refill? 1/27/20.    Lisbeth Thomason, CMA

## 2021-06-10 NOTE — PROGRESS NOTES
ASSESSMENT:  1. Cervical Radiculopathy  - cyclobenzaprine (FLEXERIL) 10 MG tablet; Take 1 tablet (10 mg total) by mouth at bedtime as needed for muscle spasms.  Dispense: 30 tablet; Refill: 0  - predniSONE (DELTASONE) 20 MG tablet; Take 1 tablet (20 mg total) by mouth daily for 10 days.  Dispense: 10 tablet; Refill: 0  - oxyCODONE-acetaminophen (PERCOCET) 5-325 mg per tablet; Take 1 tablet by mouth every 6 (six) hours as needed for pain.  Dispense: 40 tablet; Refill: 0  - XR Cervical Spine 2 - 3 VWS; Future  - ketorolac injection 60 mg (TORADOL); Inject 2 mL (60 mg total) into the shoulder, thigh, or buttocks once.      PLAN:  There are no Patient Instructions on file for this visit.    Orders Placed This Encounter   Procedures     XR Cervical Spine 2 - 3 VWS     Standing Status:   Future     Number of Occurrences:   1     Standing Expiration Date:   5/12/2018     Order Specific Question:   Can the procedure be changed per Radiologist protocol?     Answer:   Yes     Medications Discontinued During This Encounter   Medication Reason     oxyCODONE-acetaminophen (PERCOCET) 5-325 mg per tablet Reorder     Administrations This Visit     ketorolac injection 60 mg (TORADOL)     Admin Date Action Dose Route Administered By             05/12/2017 Given 60 mg Intramuscular Kristen Unger CMA                        No Follow-up on file.     Prescribed prednisone and cyclobenzaprine for cervical radiculopathy. Patient will receive a Toradol shot in clinic today. Encouraged patient to rest. Ordered a Cervical Spine X-ray; will await Radiologist read, and will consider ordering an MRI once results come back.    CHIEF COMPLAINT:  Chief Complaint   Patient presents with     Neck Pain     old neck injury- last few days has had hard to move       HISTORY OF PRESENT ILLNESS:  Jose is a 32 y.o. male presenting to the clinic today for evaluation of neck pain. He has a history of neck pain. He had an MRI in 2014, and [facility]. He used to play football and hockey. He has had shoulder surgeries for football-related injuries. Recently, his neck has had very limited range of motion over the last few days because of neck pain. He denies recent neck injury. The neck pain radiates into both of his shoulders. He would experience a jarring pain if he were to snap his neck quickly. He has used cyclobenzaprine in the past with short-term pain relief. He has had Toradol shots in the past. He cannot take oral ibuprofen because of Chron's disease. The neck pain he is experiencing now is as bad as it has ever been. He has not seen [facility] before.     Crohn's Disease: He does a prednisone taper before Remicade infusions. He has been taking prednisone because he will have an infusion.     REVIEW OF SYSTEMS:   Comprehensive review of systems negative except as noted above.    PFSH:  Reviewed, as below.     TOBACCO USE:  History   Smoking Status     Never Smoker   Smokeless Tobacco     Current User     Types: Chew     Comment: No exposure       VITALS:  Vitals:    05/12/17 1127   BP: 112/80   Pulse: 100   Weight: (!) 277 lb 12.8 oz (126 kg)     Wt Readings from Last 3 Encounters:   05/12/17 (!) 277 lb 12.8 oz (126 kg)   03/14/17 (!) 287 lb 11.2 oz (130.5 kg)   09/13/16 (!) 277 lb 4.8 oz (125.8 kg)     Body mass index is 34.72 kg/(m^2).    PHYSICAL EXAM:  General Appearance: Alert, cooperative, no distress, appears stated age  HEENT: Pupils equal, symmetric  Lungs: Respirations unlabored  Musculoskeletal: Neck tightness from the lower neck to the shoulder area, reduced neck movement, point tenderness around the C6 area.   Neurologic:  Alert and oriented times 3. Normal reflexes. Cranial nerves II-XII intact.   Psychiatric: Normal mood and affect.      ADDITIONAL HISTORY SUMMARIZED (2): None.  DECISION TO OBTAIN EXTRA INFORMATION (1): None.   RADIOLOGY TESTS (1): Ordered Cervical Spine X-ray. Reviewed 5/22/2014  "Cervical Spine MRI, indication: \"Neck pain radiating into the upper extremities.\"  LABS (1): None.  MEDICINE TESTS (1): None.  INDEPENDENT REVIEW (2 each): None.       The visit lasted a total of 15 minutes face to face with the patient. Over 50% of the time was spent counseling and educating the patient about neck pain.    ITravon, am scribing for and in the presence of, Dr. Pettit.    I, Dr. Pettit, personally performed the services described in this documentation, as scribed by Travon Parker in my presence, and it is both accurate and complete.    MEDICATIONS:  Current Outpatient Prescriptions   Medication Sig Dispense Refill     inFLIXimab (REMICADE) 100 mg injection Infuse into a venous catheter.       methotrexate, PF, 25 mg/mL Soln injection Infuse into a venous catheter.       MULTIVITAMIN ORAL Take by mouth.       multivitamin with minerals (THERA-M) 9 mg iron-400 mcg Tab tablet Take 1 tablet by mouth daily.       oxyCODONE-acetaminophen (PERCOCET) 5-325 mg per tablet Take 1 tablet by mouth every 6 (six) hours as needed for pain. 40 tablet 0     predniSONE (DELTASONE) 10 mg tablet Take 10 mg by mouth daily.       cyclobenzaprine (FLEXERIL) 10 MG tablet Take 1 tablet (10 mg total) by mouth at bedtime as needed for muscle spasms. 30 tablet 0     predniSONE (DELTASONE) 20 MG tablet Take 1 tablet (20 mg total) by mouth daily for 10 days. 10 tablet 0     No current facility-administered medications for this visit.      Facility-Administered Medications Ordered in Other Visits   Medication Dose Route Frequency Provider Last Rate Last Dose     glucagon (human recombinant) injection 1 mg  1 mg Intravenous Once Tereso Soto MD           Total data points: 1      "

## 2021-06-10 NOTE — PROGRESS NOTES
SUBJECTIVE:   Jose Zuniga is a 32 y.o. male [facility].  Patient reports that he is feeling a little bit better he rates his pain a 4/10 of his bilateral neck.  He reports that he is having a bit more range of motion in his neck from doing the home exercises that was prescribed at the last visit.  His pain can go up to 8/10 when aggravated.     PROCEDURE:  32 y.o. male with occipital, cervical, and thoracic somatic dysfunctions.      PRE-PROCEDURE DIAGNOSIS: Neck pain and cervical facet syndrome      PROCEDURE PERFORMED: joint manipulations.      Brief Procedure: The patient understands the risks and benefits of spinal manipulations      Procedure:  The patient has pain, tenderness and spasm at cervical, and thoracic regions.  Bilateral occipital PS, C1, C3, C4, C6, T1, T2, T4, and T5 malrotated which are determined by static and motion palpations. Patient was placed on table supine and prone. The occipital, cervical, and thoracic were manipulated by drop table and hands.  Scalene stretches applied bilaterally.  The patient tolerated the manipulations, scalene stretch without any complications.    DIAGNOSIS:  Diagnoses and all orders for this visit:    Cervicalgia    Cervical facet joint syndrome    Segmental and somatic dysfunction of head region    Cervical segment dysfunction    Thoracic region somatic dysfunction    Facet arthritis of cervical region      DISCUSSION/PLAN:  This is a 32 y.o. male with medical history significant of lumbar radiculopathy, Crohn's colitis, tobacco abuse, right shoulder arthritis who presents today for spinal manipulation.  Patient has improvement in pain intensity, but he still has neck stiffness.  3-4 regions were manipulated.     Plan:    Home instructions: ice QID for 10-15 minutes and OTC NSAID prn.    Patient will follow up this week for spinal manipulations.    Frequency: One more visit        Marita Dumont DC, RAOUL, PA-C

## 2021-06-10 NOTE — PROGRESS NOTES
Assessment:  1.  Cervical osteoarthritis and possibly cervical radiculopathy here.  2.  Lumbar osteoarthritis and degenerative disc, with left lumbar radiculopathy.  3.  Multiple nevi on his back, several that are questionable for being slightly dysplastic.    Plan: We will schedule both cervical and lumbar MRI.  He is scheduled to see  spine clinic tomorrow.  He certainly may need an injection in the lower back depending on the findings of the MRIs.  Discussed with him that he has definite known osteoarthritis in both the cervical and the lumbar areas by his scans of 2014 and 2015 as well as his recent cervical x-ray.  He has lived with chronic pain.  But the recent flareup certainly warrants further evaluation and potential injection etc.  And recommend that he follow-up for overall physical at his convenience.  Stated it would certainly be okay for him to pursue seeing dermatology.  Spent in excess of 25 minutes with at least 50% in counseling.  Do recommend that he eat healthy and continue to try to pursue gradual weight reduction.  He will finish the prednisone prescription from the walk-in clinic, use the cyclobenzaprine at bedtime as needed, and he has the prescription for the oxycodone from the walk-in clinic but definitely tries to avoid that.    Subjective: 32-year-old male presenting for evaluation of follow-up of the above.  See the recent walk-in clinic note.  Regarding his neck he has had flareup of pain in the neck and feels it more on the right side and notes that there is pain that goes to the right shoulder as well as some tingling that goes to the right shoulder but does not go down into the arm or hand.  He has also had flareup of lower back pain and has pain that radiates down the left leg to the mid calf area and is associated with tingling and numbness in that same area of distribution.  He has almost no discomfort in the right leg.  He has had previous injections by [facility].  He  notes that since their marriage her into [facility] it has been more difficult to pursue scheduling or follow-up there.  He does take the cyclobenzaprine at bedtime and does note that that causes drowsiness.  He does feel that the neck is a little bit better since being on the prednisone from the walk-in clinic.  He has had difficulty for some years, and when he stopped working at [employer] he notes that both his neck and back were better but his chronic pain was never completely gone.  Then with the recent flareup in the difficulty in turning side to side etc. let him to this follow-up.  Past Medical History:   Diagnosis Date     Chronic back pain      Chronic neck pain      Eczema     mild, as a child     Shingles      No Known Allergies  Current Outpatient Prescriptions   Medication Sig Dispense Refill     cyclobenzaprine (FLEXERIL) 10 MG tablet Take 1 tablet (10 mg total) by mouth at bedtime as needed for muscle spasms. 30 tablet 0     inFLIXimab (REMICADE) 100 mg injection Infuse into a venous catheter.       methotrexate, PF, 25 mg/mL Soln injection Infuse into a venous catheter.       multivitamin with minerals (THERA-M) 9 mg iron-400 mcg Tab tablet Take 1 tablet by mouth daily.       oxyCODONE-acetaminophen (PERCOCET) 5-325 mg per tablet Take 1 tablet by mouth every 6 (six) hours as needed for pain. 40 tablet 0     predniSONE (DELTASONE) 20 MG tablet Take 1 tablet (20 mg total) by mouth daily for 10 days. 10 tablet 0     MULTIVITAMIN ORAL Take by mouth.       predniSONE (DELTASONE) 10 mg tablet Take 10 mg by mouth daily.       No current facility-administered medications for this visit.      Facility-Administered Medications Ordered in Other Visits   Medication Dose Route Frequency Provider Last Rate Last Dose     glucagon (human recombinant) injection 1 mg  1 mg Intravenous Once Tereso Soto MD         He follows with GI on a regular basis for the Crohn's disease and is on the Remicade and  "gets his next infusion this next Monday.  He had labs done in March and will be getting lab done again next Monday.  See those notes from GI.  He denies any fever nausea or vomiting.  No change in terms of bowel movements or urination etc.  No difficulty with muscle control of his urination or bowel movements.  He notes that he had lost weight down to about 210 or 220 pounds when he had been ill with the Crohn's.  He obviously did not feel good at that time are good at that weight.  He has gained weight since with control of the Crohn's but he is currently trying to eat healthy and trying to pursue gradual weight reduction to try to lose about 6 pounds a month.    Objective:/70  Pulse (!) 103  Ht 6' 3\" (1.905 m)  Wt (!) 281 lb (127.5 kg)  SpO2 98%  BMI 35.12 kg/m2  Alert male, obviously uncomfortable sitting or standing.  Head normocephalic.  External ears and TMs eyes nose and oropharynx no acute change.  His neck is stiff in the cervical muscles are in some spasm.  There is no spinous process tenderness in the cervical or thoracic spine but there was some slight tenderness to palpation in the region of about L1 or L2, i.e. the upper lumbar spine.  His motor strength in the lower extremities and upper extremities appears to be full with no focal weakness.  Peripheral pulses are full.  Lungs are clear to auscultation.  Heart regular rate and rhythm without murmur.  Abdomen shows no masses tenderness or hepatosplenomegaly.  Skin shows numerous nevi, especially on the back, and in the central mid back to the right side there are several small moles that have some mild dysplastic appearance.    "

## 2021-06-10 NOTE — PROGRESS NOTES
SUBJECTIVE:   Jose Zuniga is a 32 y.o. male [facility].  She is a referral from CHAYA Eller, ZIGGY.  He was seen today by Rosa Fagan PA-C.  Patient suffer from neck and lower back pain.  He is experiencing stiffness and decreased range of motion due to his pain in his neck.  He reports that the pain is on bilateral neck that radiates into the shoulder with paresthesia.  He has experienced this for the past 2 weeks.  Today, he rates the pain a 7/10.    PROCEDURE:  32 y.o. male with occipital, cervical, and thoracic somatic dysfunctions.      PRE-PROCEDURE DIAGNOSIS: Neck pain and cervical facet syndrome      PROCEDURE PERFORMED: joint manipulations.      Brief Procedure: The patient understands the risks and benefits of spinal manipulations      Procedure:  The patient has pain, tenderness and spasm at occipital, cervical, and thoracic regions.  Bilateral occipital PS, C1, C2, C4, C6, T2, T4, and T6 malrotated which are determined by static and motion palpations. Patient was placed on table supine and prone. The occipital, cervical, and thoracic were manipulated by drop table and hands.  Scalene stretches applied bilaterally.  The patient tolerated the manipulations, scalene stretch without any complications.    DIAGNOSIS:  Diagnoses and all orders for this visit:    Cervicalgia    Cervical facet joint syndrome    Segmental and somatic dysfunction of head region    Cervical segment dysfunction    Thoracic region somatic dysfunction        DISCUSSION/PLAN:  This is a 32 y.o. male with medical history significant of lumbar radiculopathy, Crohn's colitis, tobacco abuse, right shoulder arthritis who presents today for spinal manipulation.  Patient has acute flareup of chronic neck pain bilaterally.  Patient has moderate muscle spasm that prevent cervical range of motion.  Patient tolerate spinal manipulation today.  I recommend patient come in a couple more visits to  increase range of motion and decreased pain. Patient agree with this plan.  3-4 regions were manipulated.     Plan:    Home instructions: ice QID for 10-15 minutes and OTC NSAID prn.    Patient will follow up next week for spinal manipulations.    Frequency: 2 more visits        Marita Dumont DC, RAOUL, PA-C
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
## 2021-06-10 NOTE — TELEPHONE ENCOUNTER
Patient on [facility] lab schedule 8/14/20 at 11:00am for Sajjad labs. No orders in chart

## 2021-06-10 NOTE — PROGRESS NOTES
[facility]   [facility]umbo-Pelvic Initial Evaluation    Patient Name: Jose Zuniga  Date of evaluation: 5/24/2017  Referral Diagnosis: MEDX  Lumbalgia [M54.5]  - Primary       Lumbar radiculitis [M54.16]       Lumbar disc herniation [M51.26]       Myofascial pain [M79.1]       Cervicalgia [M54.2]       Cervical radiculitis [M54.12]       Sleep disturbance [G47.9]       Facet arthritis of cervical region [M46.92]       Referring provider: Rosa Fagan PA-C  Visit Diagnosis:     ICD-10-CM    1. Chronic bilateral low back pain without sciatica M54.5     G89.29    2. Lumbar radiculopathy M54.16    3. Neck pain M54.2    4. Cervical radiculitis M54.12    5. Decreased ROM of neck R29.898        Assessment:        Jose Zuniga is a 32 y.o. male who presents to therapy today with chief complaints of acute flare of both chronic neck and low back pain, with bilateral shoulder and left leg tingling. The patient is limited with driving, walking, bending and lifting due to pain. With examination today, the patient demonstrates significant ROM limitations in the neck, normal myotome/dermatome screens, normal LE neural tension testing, and tenderness with PA's at L3,4 and C4,5. The patients pain is consistent with facet arthropathies, but potential discogenic pain due to radicular symptoms. The patient will likely benefit from skilled PT with use of MEDX to improve strength, ROM, mobility, pain and overall function. Awaiting MRI's to determine full POC.    Goals:  Pt. will be independent with home exercise program in : 4 weeks  Pt. will report decreased intensity, frequency of : Pain;in 4 weeks;Comment  Comment:: 50%  [place] in [place] 6 weeks:  Pt will: be able to ride his bike without increase in pain in 6 weeks:  Pt will: be able to play with his nephew without a flare in back pain in 6 weeks:    Patient's expectations/goals are realistic.    Barriers to Learning or Achieving  Goals:  Chronicity of the problem.       Plan / Patient Instructions:        Plan of Care:   Communication with: Referral Source  Patient Related Instruction: Nature of Condition;Treatment plan and rationale;Self Care instruction;Basis of treatment;Body mechanics;Precautions;Posture;Next steps;Expected outcome  Times per Week: 1-2  Number of Weeks: 12  Number of Visits: 16  Discharge Planning: pt will meet all PT goals or reach a plateau with PT  Precautions / Restrictions : none  Therapeutic Exercise: ROM;Stretching;Strengthening  Neuromuscular Reeducation: kinesio tape;posture;TNE  Manual Therapy: soft tissue mobilization;myofascial release;joint mobilization;muscle energy  Modalities: TENS;traction;ultrasound;cold pack;hot pack;other  Modalities: LIMITED PRN    Plan for next visit: testing on cervical and lumbar MEDX, MT to improve cervical ROM with MFR and joint mobilizations, rotational SNAGS and cervical stretching as able. Hip mobilizations to the left hip as able as well. Eventual core strengthening and lumbar stretches.      Subjective:         Social information:   Occupation:   Work Status:Working full time    History of Present Illness:      Pain started with the back in 2011 in the Winter time, back started getting stiff an sore. It progressively got worse over the last few years. Has had MRI showing L2,3 fused autofusion and is awaiting results of new MRI. He has had PT and injections for the spine and feels that everything has been a band-aide. He has not had any injections recently, and will see what MRI shows to determine a plan. He had a flare of pain a few weeks ago with increased neck pain with nerve pain down the leg. He is on medication for this which has helped.    A few weeks ago had a stabbing pain in the back of the neck and then things tightened up and made it tough to move in the back. A lot of on and off neck pain in the past from sports.     Pain described as in the neck  a dull pain in the neck, but has been stinging, dull and tingling pain pain into both shoulders. R>L.    Low back pain is decreased, but stinging/sharp in feel. Pain in the left leg pain, tingling pain.    Worse with walking, bending, lifting, changing positions, driving, dressing.  Better with stretching, pain medication.   Previous Treatment injections, PT, medication, chiropractic.  Likes to stay active with walking, golfing and biking.    Pain Ratin  Pain rating at best: 3  Pain rating at worst: 9  Pain description: pain, sharp and tingling    Functional limitations are described as occurring with:   walking, bending, lifting, changing positions, driving, dressing.         Objective:      Note: Items left blank indicates the item was not performed or not indicated at the time of the evaluation.    Patient Outcome Measures :    Modified Oswestry Low Back Pain Disablity Questionnaire  in %: 36   Scores range from 0-100%, where a score of 0% represents minimal pain and maximal function. The minimal clinically important difference is a score reduction of 12%.    Examination  1. Chronic bilateral low back pain without sciatica     2. Lumbar radiculopathy     3. Neck pain     4. Cervical radiculitis     5. Decreased ROM of neck       Precautions/Restrictions: None  Involved side: Bilateral  Posture Observation:      General sitting posture is  normal.  General standing posture is normal.    Lumbar ROM:    Date: 17     *Indicate scale AROM AROM AROM   Lumbar Flexion 8 cm nagging in back     Lumbar Extension Moderate limit      Right Left Right Left Right Left   Lumbar Sidebending Mod limit Pain on R Mod limit pain on L       Lumbar Rotation Mild limit Mild limit       Thoracic Rotation           Cervical ROM   Date: 17     *Indicate scale AROM AROM AROM   Cervical Flexion 30 deg     Cervical Extension 20  Deg pain and sting      Right Left Right Left Right Left   Cervical Sidebending 10 deg 10 deg        Cervical Rotation 28 deg muscle pull 30 deg muscle pull         UE Myotomes: WNL    Lower Extremity Strength:   WNL  Date: 5/24/17     LE strength/5 Right Left Right Left Right Left   Hip Flexion (L1-3) 5 5       Hip Extension (L5-S1) 5 5       Hip Abduction (L4-5) 5 5       Hip Adduction (L2-3)         Hip External Rotation         Hip Internal Rotation         Knee Extension (L3-4) 5 5       Knee Flexion 5 5       Ankle Dorsiflexion (L4-5) 5 5       Great Toe Extension (L5) 5 5       Ankle Plantar flexion (S1) 5 5       Abdominals        Sensation    WNL to lt touch screen of UE and LE dermatomes  Reflex Testing  Lumbar Dermatomes Right Left UE Reflexes Right Left   Iliac Crest and Groin (L1)   Biceps (C5-6)     Anterior Medial Thigh (L2)   Brachioradialis (C5-6)     Anterior Thigh, Medial Epicondyle Femur (L3)   Triceps (C7-8)     Lateral Thigh, Anterior Knee, Medial Leg/Malleolus (L4)   Derrick s test     Lateral Leg, Dorsal Foot (L5)   LE Reflexes     Lateral Foot (S1)   Patellar (L3-4)     Posterior Leg (S2)   Achilles (S1-2)     Other:   Babinski Response       Palpation: tenderness to cervical paraspinals near C5 UT and levator R>L. Tenderness to lumbar paraspinals bilaterally L>R and piriformis on L.    Lumbar Special Tests:     Lumbar Special Tests Right Left SI Tests Right  Left   Quadrant test   SI Compression     Straight leg raise 80 - 75- SI Distraction     Crossover response - - POSH Test - -   Slump - - Sacral Thrust - -   Sit-up test  FADIR - +   Trunk extensor endurance test  Resisted Abduction - -   Prone instability test  BELL - -   Pubic shotgun  Other:       Cervical Special Tests   Cervical Special Tests Right Left UE Nerve Mobility Right Left   Cervical compression   Median nerve     Cervical distraction stretch stretch Ulnar nerve     Spurling s test Pain with extension, too limited to assess Pain with extension, too limited to assess Radial nerve     Shoulder abduction sign   Thoracic  outlet     Deep neck flexor endurance test   Sanaz     Upper cervical rotation   Adson s     Sharper-Cathi   Cervical rotation lateral flexion     Alar ligament test   Other:     Other:   Other:       Repeated Motion Testing:  Does not centralize  Does not peripheralize    Passive Mobility - Joint Integrity:  Tenderness to L3,4   Tenderness to C 4,5    LE Screen:  Decreased and painful hip IR on the left.     Treatment Today     TREATMENT MINUTES COMMENTS   Evaluation 45 Cervical and lumbar assessment  Low complexity   Self-care/ Home management     Manual therapy     Neuromuscular Re-education     Therapeutic Activity     Therapeutic Exercises 10 Education on HEP, MEDX, and POC   Gait training     Modality__________________                Total 55    Blank areas are intentional and mean the treatment did not include these items.     PT Evaluation Code: (Please list factors)  Patient History/Comorbidities: none  Examination: see objective  Clinical Presentation: stable  Clinical Decision Making: low    Patient History/  Comorbidities Examination  (body structures and functions, activity limitations, and/or participation restrictions) Clinical Presentation Clinical Decision Making (Complexity)   No documented Comorbidities or personal factors 1-2 Elements Stable and/or uncomplicated Low   1-2 documented comorbidities or personal factor 3 Elements Evolving clinical presentation with changing characteristics Moderate   3-4 documented comorbidities or personal factors 4 or more Unstable and unpredictable High                J Luis LAURA  5/24/2017  3:00 PM

## 2021-06-10 NOTE — PROGRESS NOTES
SUBJECTIVE:   Jose Zuniga is a 32 y.o. male [facility].  Patient reports that he is feeling a little bit better he rates his pain a 6/10 of his bilateral neck.  Patient reports that he is doing a little bit better and his pain and range of motion.  This is a patient last scheduled spinal manipulation.  He is wondering if he needs to continue with spinal manipulation.  She just finished physical therapy.  He is doing the prescribed exercise that was given to him at the last visit.    PROCEDURE:  32 y.o. male with occipital, cervical, and thoracic somatic dysfunctions.      PRE-PROCEDURE DIAGNOSIS: Neck pain and cervical facet syndrome      PROCEDURE PERFORMED: joint manipulations.      Brief Procedure: The patient understands the risks and benefits of spinal manipulations      Procedure:  The patient has pain, tenderness and spasm at cervical regions.  Bilateral occipital PS, C2, C3, C5, T1, T3, and T5 malrotated which are determined by static and motion palpations. Patient was placed on table supine and prone. The occipital, cervical, and thoracic were manipulated by drop table and hands.  Scalene stretches applied bilaterally.  The patient tolerated the manipulations, scalene stretch without any complications.    DIAGNOSIS:  Diagnoses and all orders for this visit:    Cervicalgia    Cervical facet joint syndrome    Segmental and somatic dysfunction of head region    Cervical segment dysfunction    Thoracic region somatic dysfunction      DISCUSSION/PLAN:  This is a 32 y.o. male with medical history significant of lumbar radiculopathy, Crohn's colitis, tobacco abuse, right shoulder arthritis who presents today for spinal manipulation.  Patient is slightly improving his range of motion and pain.  I recommend patient come to more weeks for spinal manipulation.  The goal is to decrease his pain 50%. 3-4 regions were manipulated.     Plan:    Home instructions: ice QID for 10-15  minutes and OTC NSAID prn.    Patient will follow up next week for spinal manipulations.    Frequency: 2x week for 2 more weeks        Marita Dumont DC, RAOUL, PA-C

## 2021-06-11 NOTE — PROGRESS NOTES
SUBJECTIVE:   Jose Zuniga is a 32 y.o. male [facility]  Patient reports that he is feeling better of his neck and upper back.  He has some pain in his lower back.  Today, he rates his pain in his neck and upper back a 4/10.  He reports that he is experiencing a lot of muscle spasm and stiffness.       PROCEDURE:  32 y.o. male with occipital, cervical, thoracic, lumbar, sacral, and sacroiliac somatic dysfunctions.      PRE-PROCEDURE DIAGNOSIS: Neck pain, cervical facet syndrome, and chronic lower back pain      PROCEDURE PERFORMED: joint manipulations.      Brief Procedure: The patient understands the risks and benefits of spinal manipulations      Procedure:  The patient has pain, tenderness and spasm at cervical, thoracic and lumbosacral regions.  Bilateral occipital PS, C2, C3, C4, T2, T3, T4, T7, T8, L2, L3, L4, L5, left sacral and left SI joint malrotated which are determined by static and motion palpations. Patient was placed on table supine and prone. The occipital, cervical, thoracic, lumbar, sacral, and sacroiliac were manipulated by drop table and hands.  Scalene stretches applied bilaterally.  Electrical stimulation is applied to the bilateral cervical paraspinal and trapezius muscle with infrared therapy. The patient tolerated the manipulations, scalene stretch , stimulation and infrared therapy without any complications.    DIAGNOSIS:  There are no diagnoses linked to this encounter.    DISCUSSION/PLAN:  This is a 32 y.o. male with medical history significant of lumbar radiculopathy, Crohn's colitis, tobacco abuse, right shoulder arthritis, chronic lower back pain who presents today for spinal manipulation.  Patient has increase stiffness in his neck and upper back which is prognosis guarded.  Lower back pain is stable with spinal manipulation. 6 regions were manipulated with physical modalities.     Plan:    Home instructions: Heat QID for 10-15 minutes and OTC  NSAID prn.    Patient will follow up next week for spinal manipulations.    Frequency:1 more spinal manipulation        Marita Dumont DC, MSPAS, PA-C

## 2021-06-11 NOTE — PROGRESS NOTES
[facility] Daily Progress     Patient Name: Jose Zuniga  Date: 6/12/2017  Visit #: 2  PTA visit #:    Referral Diagnosis:   724.2 (ICD-9-CM) - M54.5 (ICD-10-CM) - Lumbalgia   724.4 (ICD-9-CM) - M54.16 (ICD-10-CM) - Lumbar radiculitis   722.10 (ICD-9-CM) - M51.26 (ICD-10-CM) - Lumbar disc herniation   729.1 (ICD-9-CM) - M79.1 (ICD-10-CM) - Myofascial pain   723.1 (ICD-9-CM) - M54.2 (ICD-10-CM) - Cervicalgia   723.4 (ICD-9-CM) - M54.12 (ICD-10-CM) - Cervical radiculitis   780.50 (ICD-9-CM) - G47.9 (ICD-10-CM) - Sleep disturbance   721.0 (ICD-9-CM) - M46.92 (ICD-10-CM) - Facet arthritis of cervical region     Referring provider: Rosa Fagan PA-C  Visit Diagnosis:     ICD-10-CM    1. Chronic bilateral low back pain without sciatica M54.5     G89.29    2. Lumbar radiculopathy M54.16    3. Neck pain M54.2    4. Cervical radiculitis M54.12    5. Decreased ROM of neck R29.898          Assessment:     Patient is benefitting from skilled physical therapy and is making steady progress toward functional goals.  Patient is appropriate to continue with skilled physical therapy intervention, as indicated by initial plan of care.     Pt has not been to PT over recent weeks while waiting on MRI results. Pt is appropriate to continue with MedX based on imaging results. Pt demonstrates limited ROM and weakness with cervical and lumbar MedX testing today compared to norms. Pt is below average for each. Pt is tolerating HEP well and appropriate to continue with daily. Pt is very motivated and expects to do well with PT and MedX program.    Goal Status:  Pt. will be independent with home exercise program in : 4 weeks;Progressing toward  Pt. will report decreased intensity, frequency of : Pain;in 4 weeks;Comment;Progressing toward  Comment:: 50%  [place] in [place] in 6 weeks: (In progress)  Pt will: be able to ride his bike without increase in pain in 6 weeks: (In progress)  Pt will:  be able to play with his nephew without a flare in back pain in 6 weeks: (In progress)    Plan / Patient Education:     Continue with initial plan of care.  Progress with home program as tolerated.     Next session: review HEP, continue with MedX DE (cervical and lumbar), rotary torso, review cervical SNAGs    Subjective:     Pain Ratin  Pt shares that he continues to feel stiff with movement. Pt is considering possible injection in his lower back. Pt is worried that he won't be able to do much once he gets  and has children.       Objective:     Lumbar MedX Initial testin17 4-week Re-test   AROM (full=  0-72  lumbar) 0-51 deg    Max Extension Torque  203#    Average Extension Torque 146#    Flex: ext ratio (ideal 1.4:1) 3.29:1        Cervical MedX Initial testin17 4-week Re-test   AROM (full= 0-120  cervical)  deg    Max Extension Torque  174#    Flex: ext ratio (ideal 1.4:1) 2.72:1      Soreness after MedX testing, cervical and lumbar    Seat height may need to be adjusted with MedX cervical    Continued limitations with lumbar and cervical ROM, cervical more involved   Cervical snags added to HEP    Treatment Today     TREATMENT MINUTES COMMENTS   Evaluation     Self-care/ Home management     Manual therapy     Neuromuscular Re-education     Therapeutic Activity     Therapeutic Exercises 30 See exercise and MedX flowsheets. Included MedX testing only. Added cervical snags to HEP.    Gait training     Modality__________________                Total 30    Blank areas are intentional and mean the treatment did not include these items.       Emory Gallegos, PT, DPT  2017

## 2021-06-11 NOTE — PROGRESS NOTES
SUBJECTIVE:   Jose Zuniga is a 32 y.o. male [facility]  Patient reports that he feels less stiff compared to the last spinal manipulation.  Today, he rates pain 2/10 of the neck upper back.  He reports that the stretching is helping his back and neck.      PROCEDURE:  32 y.o. male with cervical, thoracic, lumbar, sacral, and sacroiliac somatic dysfunctions.      PRE-PROCEDURE DIAGNOSIS: Neck pain, cervical facet syndrome, and chronic lower back pain      PROCEDURE PERFORMED: joint manipulations.      Brief Procedure: The patient understands the risks and benefits of spinal manipulations      Procedure:  The patient has pain and spasm at cervical, thoracic and lumbosacral regions.  C3, C6, T1, T2, T4, T5, L3, L4, L5, left sacral and left SI joint malrotated which are determined by static and motion palpations. Patient was placed on table supine and prone. The cervical, thoracic, lumbar, sacral, and sacroiliac were manipulated by drop table and hands.  Scalene stretches applied bilaterally. The patient tolerated the manipulations, and scalene stretch without any complications.    DIAGNOSIS:  There are no diagnoses linked to this encounter.  Encounter Diagnoses   Name Primary?     Myofascial pain Yes     Cervicalgia      Chronic bilateral low back pain without sciatica      Cervical segment dysfunction      Thoracic region somatic dysfunction      Segmental dysfunction of lumbar region      Sacroiliac joint somatic dysfunction      Sacral region somatic dysfunction        DISCUSSION/PLAN:  This is a 32 y.o. male with medical history significant of lumbar radiculopathy, Crohn's colitis, tobacco abuse, right shoulder arthritis, chronic lower back pain who presents today for spinal manipulation.  Patient has improvement of stiffness and pain in his neck and upper back.  He is at a tolerable level pain in his neck and upper back that can be managed with stretching.  Patient still  has physical therapy appointments.  We will have patient return back to his spine specialist. . 5 regions were manipulated.    Plan:    Home instructions: Heat QID for 10-15 minutes and OTC NSAID prn.    Patient will follow up as needed basis for spinal manipulation.      Marita Dumont DC, RAOUL, PA-C

## 2021-06-11 NOTE — PROGRESS NOTES
F/U   --To review MRI lumbar and cervical spine 5/23/17  --C/O B/L posterior neck pain with numbness in the B/L shoulders, no change   --ROM for neck is limited per pt  --Also C/O midline low back pain with numbness in the left buttock and left posterior/lateral thigh  --Neck symptom is the most concerning per pt  --SMT x 4 sessions with Marita (5/19/17-6/2/17)  --Medx x 1 sessions [facility], 5/24/17. Was told to hold on schedule until evaluation by provider per pt.     Medication  --Gabapentin 300 mg 1 cap daily only, could not tolerate higher dose per pt  --Flexeril 10 mg 1 tab QHS PRN  --Percocet 5-326 mg, does not like taking this and will only use for severe pain per pt

## 2021-06-11 NOTE — PROGRESS NOTES
[facility] Daily Progress     Patient Name: Jose Zuniga  Date: 7/6/2017  Visit #: 8  PTA visit #:    Referral Diagnosis:   724.2 (ICD-9-CM) - M54.5 (ICD-10-CM) - Lumbalgia   724.4 (ICD-9-CM) - M54.16 (ICD-10-CM) - Lumbar radiculitis   722.10 (ICD-9-CM) - M51.26 (ICD-10-CM) - Lumbar disc herniation   729.1 (ICD-9-CM) - M79.1 (ICD-10-CM) - Myofascial pain   723.1 (ICD-9-CM) - M54.2 (ICD-10-CM) - Cervicalgia   723.4 (ICD-9-CM) - M54.12 (ICD-10-CM) - Cervical radiculitis   780.50 (ICD-9-CM) - G47.9 (ICD-10-CM) - Sleep disturbance   721.0 (ICD-9-CM) - M46.92 (ICD-10-CM) - Facet arthritis of cervical region     Referring provider: Rosa Fagan PA-C  Visit Diagnosis:     ICD-10-CM    1. Chronic bilateral low back pain without sciatica M54.5     G89.29    2. Lumbar radiculopathy M54.16    3. Neck pain M54.2    4. Cervical radiculitis M54.12    5. Decreased ROM of neck R29.898          Assessment:     Patient is benefitting from skilled physical therapy and is making steady progress toward functional goals.  Patient is appropriate to continue with skilled physical therapy intervention, as indicated by initial plan of care.     Pt demonstrating improved ROM and strength with re-testing MedX today. Pt continues to be compliant with HEP, but frustrated with lack of improvements with ROM. Pt continues to have hypomobility in cervical spine, but tightness also limiting. Pt is appropriate to continue with MedX for further strengthening and mobility. Pt may require increases with resistance for appropriate repetitions.     Goal Status:  Pt. will be independent with home exercise program in : 4 weeks;Progressing toward  Pt. will report decreased intensity, frequency of : Pain;in 4 weeks;Comment;Progressing toward  Comment:: 50%  [place] in [place] 6 weeks: (In progress)  Pt will: be able to ride his bike without increase in pain in 6 weeks: (In progress)  Pt will: be able to  play with his nephew without a flare in back pain in 6 weeks: (In progress)    Plan / Patient Education:     Continue with initial plan of care.  Progress with home program as tolerated.     Next session: review HEP, continue with MedX DE (cervical and lumbar), rotary torso, possible MFR or cervical joint mobs for cervical spine, progress mobility exercises    Subjective:     Pain Ratin-3  Pt [place]. He says that he has noticed improved ROM and less difficulty sleeping.    Objective:     Improved cervical and lumbar ROM with re-testing today    Increased muscle tightness with passive cervical flexion    Mild tenderness throughout cervical spine with PAs    Lumbar MedX Initial testin17 4-week Re-test: 17   AROM (full=  0-72  lumbar) 0-51 deg 0-60 deg   Max Extension Torque  203# 329#   Average Extension Torque 146# 278#   Flex: ext ratio (ideal 1.4:1) 3.29:1 1.61:1       Cervical MedX Initial testin17 4-week Re-test: 17   AROM (full= 0-120  cervical)  deg  deg   Max Extension Torque  174# 500#   Flex: ext ratio (ideal 1.4:1) 2.72:1 3.47:1     Cervical ROM   Date: 17    *Indicate scale AROM AROM AROM   Cervical Flexion 30 deg 40 deg    Cervical Extension 20  Deg pain and sting 35 deg     Right Left Right Left Right Left   Cervical Sidebending 10 deg 10 deg       Cervical Rotation 28 deg muscle pull 30 deg muscle pull 45 deg 35 deg       Exercises:  Exercise #1: Cervical ROM: HEP  Comment #1: Cervical Rotation Snag: x5 B, HEP  Exercise #2: Chin Tuck: HEP  Comment #2: X 10  Exercise #3: LTR: HEP  Comment #3: X 10  Exercise #4: Piriformis Stretch: HEP  Comment #4: X 30 seconds  Exercise #5: Femoral Nerve Glide: HEP  Comment #5: Rotary torso: started R,40#, 90 seconds  Exercise #6: Bridges  Comment #6: reviewed, bridge  Exercise #7: TA   Comment #7: X 30  Exercise #8: Cervical Isometrics:  reviewed  Comment #8: Reach and Roll: x10 B, HEP  Exercise #9: 4 way neck: SH 5, x20 each direction 30# (not performed today)    Treatment Today     TREATMENT MINUTES COMMENTS   Evaluation     Self-care/ Home management     Manual therapy 5 In supine:  - passive cervical flexion x5 with 30 sec holds  - grade 2 and 3 lateral deviation to C3-C5   Neuromuscular Re-education     Therapeutic Activity     Therapeutic Exercises 25 See exercise and MedX flowsheets. Added reach and roll to HEP.    Gait training     Modality__________________                Total 30    Blank areas are intentional and mean the treatment did not include these items.       Emory Gallegos, PT, DPT  7/6/2017

## 2021-06-11 NOTE — PROGRESS NOTES
SUBJECTIVE:   Jose Zuniga is a 32 y.o. male [facility].  Patient reports that he feels better pain in his neck is upper back but he still has some stiffness.  Patient suffer from chronic bilateral lower back pain.  Today, he rates pain a 4/10 of the neck and mild-moderate pain of the lower back.  PROCEDURE:  32 y.o. male with occipital, cervical, thoracic, lumbar, sacral, and sacroiliac somatic dysfunctions.      PRE-PROCEDURE DIAGNOSIS: Neck pain, cervical facet syndrome, and chronic lower back pain      PROCEDURE PERFORMED: joint manipulations.      Brief Procedure: The patient understands the risks and benefits of spinal manipulations      Procedure:  The patient has pain, tenderness and spasm at cervical, thoracic and lumbosacral regions.  Bilateral occipital PS, C3, C4, T1, T3, T5, L3, L5, right sacral base and left SI joint malrotated which are determined by static and motion palpations. Patient was placed on table supine and prone. The occipital, cervical, thoracic, lumbar, sacral, and sacroiliac were manipulated by drop table and hands.  Scalene stretches applied bilaterally.  The patient tolerated the manipulations, scalene stretch without any complications.    DIAGNOSIS:  Diagnoses and all orders for this visit:    Cervicalgia    Cervical facet joint syndrome    Chronic bilateral low back pain without sciatica    Segmental and somatic dysfunction of head region    Cervical segment dysfunction    Thoracic region somatic dysfunction    Segmental dysfunction of lumbar region    Sacral region somatic dysfunction    Sacroiliac joint somatic dysfunction      DISCUSSION/PLAN:  This is a 32 y.o. male with medical history significant of lumbar radiculopathy, Crohn's colitis, tobacco abuse, right shoulder arthritis, chronic lower back pain who presents today for spinal manipulation.  Patient is improving with pain but still has stiffness in his neck and upper back.   6 regions  were manipulated.     Plan:    Home instructions: Heat QID for 10-15 minutes and OTC NSAID prn.    Patient will follow up this week for spinal manipulations.    Frequency: 2x week for 1 more week        Marita Dumont DC, RAOUL, PAShoshanaC

## 2021-06-11 NOTE — PROGRESS NOTES
1st attempt   LVMTCB- to go through rooming questions. Will try again later     FREDO [facility] 8/31/2020 3:02 PM

## 2021-06-11 NOTE — PROGRESS NOTES
[facility] Daily Progress     Patient Name: Jose Zuniga  Date: 6/15/2017  Visit #: 2  PTA visit #:    Referral Diagnosis:   724.2 (ICD-9-CM) - M54.5 (ICD-10-CM) - Lumbalgia   724.4 (ICD-9-CM) - M54.16 (ICD-10-CM) - Lumbar radiculitis   722.10 (ICD-9-CM) - M51.26 (ICD-10-CM) - Lumbar disc herniation   729.1 (ICD-9-CM) - M79.1 (ICD-10-CM) - Myofascial pain   723.1 (ICD-9-CM) - M54.2 (ICD-10-CM) - Cervicalgia   723.4 (ICD-9-CM) - M54.12 (ICD-10-CM) - Cervical radiculitis   780.50 (ICD-9-CM) - G47.9 (ICD-10-CM) - Sleep disturbance   721.0 (ICD-9-CM) - M46.92 (ICD-10-CM) - Facet arthritis of cervical region     Referring provider: Rosa Fagan PA-C  Visit Diagnosis:     ICD-10-CM    1. Chronic bilateral low back pain without sciatica M54.5     G89.29    2. Lumbar radiculopathy M54.16    3. Neck pain M54.2    4. Cervical radiculitis M54.12    5. Decreased ROM of neck R29.898          Assessment:     Patient is benefitting from skilled physical therapy and is making steady progress toward functional goals.  Patient is appropriate to continue with skilled physical therapy intervention, as indicated by initial plan of care.     Pt continues to have significant cervical and lumbar stiffness today. Pt having difficulty with mobility and control with MedX DE (cervical) due to stiffness. Pt able to tolerate DE lumbar and rotary torso without pain. Pt feeling less tight after machines, but pain persisted. Pt continues to be motivated and will benefit from continuing PT and MedX program. Review of HEP will be beneficial in upcoming sessions.     Goal Status:  Pt. will be independent with home exercise program in : 4 weeks;Progressing toward  Pt. will report decreased intensity, frequency of : Pain;in 4 weeks;Comment;Progressing toward  Comment:: 50%  Pt [place] in pain in 6 weeks: (In progress)  Pt will: be able to ride his bike without increase in pain in 6 weeks: (In  "progress)  Pt will: be able to play with his nephew without a flare in back pain in 6 weeks: (In progress)    Plan / Patient Education:     Continue with initial plan of care.  Progress with home program as tolerated.     Next session: review HEP, continue with MedX DE (cervical and lumbar), rotary torso, review cervical SNAGs, adject MedX DE as needed    Subjective:     Pain Ratin  Pt says that he continues to be very stiff. He shares that the exercises are going well, but sitffness makes them difficulty. At end of session, pt says \"I feel looser with machines.\"      Objective:     Lumbar MedX Initial testin17 4-week Re-test   AROM (full=  0-72  lumbar) 0-51 deg    Max Extension Torque  203#    Average Extension Torque 146#    Flex: ext ratio (ideal 1.4:1) 3.29:1        Cervical MedX Initial testin17 4-week Re-test   AROM (full= 0-120  cervical)  deg    Max Extension Torque  174#    Flex: ext ratio (ideal 1.4:1) 2.72:1      Significant limitations with ROM during MedX DE (cervical), may need to adjust ROM or weight    Limited mobility with rotary torso as well    No increases in pain during session    Treatment Today     TREATMENT MINUTES COMMENTS   Evaluation     Self-care/ Home management     Manual therapy     Neuromuscular Re-education     Therapeutic Activity     Therapeutic Exercises 28 See exercise and MedX flowsheets.    Gait training     Modality__________________                Total 28    Blank areas are intentional and mean the treatment did not include these items.       Emory Gallegos, PT, DPT  6/15/2017    "

## 2021-06-11 NOTE — PROGRESS NOTES
[facility] Daily Progress     Patient Name: Jose Zuniga  Date: 7/13/2017  Visit #: 9  PTA visit #:    Referral Diagnosis:   724.2 (ICD-9-CM) - M54.5 (ICD-10-CM) - Lumbalgia   724.4 (ICD-9-CM) - M54.16 (ICD-10-CM) - Lumbar radiculitis   722.10 (ICD-9-CM) - M51.26 (ICD-10-CM) - Lumbar disc herniation   729.1 (ICD-9-CM) - M79.1 (ICD-10-CM) - Myofascial pain   723.1 (ICD-9-CM) - M54.2 (ICD-10-CM) - Cervicalgia   723.4 (ICD-9-CM) - M54.12 (ICD-10-CM) - Cervical radiculitis   780.50 (ICD-9-CM) - G47.9 (ICD-10-CM) - Sleep disturbance   721.0 (ICD-9-CM) - M46.92 (ICD-10-CM) - Facet arthritis of cervical region     Referring provider: Rosa Fagan PA-C  Visit Diagnosis:     ICD-10-CM    1. Chronic bilateral low back pain without sciatica M54.5     G89.29    2. Lumbar radiculopathy M54.16    3. Neck pain M54.2    4. Cervical radiculitis M54.12    5. Decreased ROM of neck R29.898          Assessment:     Patient is benefitting from skilled physical therapy and is making steady progress toward functional goals.  Patient is appropriate to continue with skilled physical therapy intervention, as indicated by initial plan of care.     Pt continues to tolerate MedX well and fatigues more appropriately. Pt having increased stiffness today, but improved with cervical MET and machines. Pt is progressing well, but frustrated with lack of progress. Pt would benefit from further mobilizations to improve mobility if tolerated.    Goal Status:  Pt. will be independent with home exercise program in : 4 weeks;Progressing toward  Pt. will report decreased intensity, frequency of : Pain;in 4 weeks;Comment;Progressing toward  Comment:: 50%  Pt [place] in pain in 6 weeks: (In progress)  Pt will: be able to ride his bike without increase in pain in 6 weeks: (In progress)  Pt will: be able to play with his nephew without a flare in back pain in 6 weeks: (In progress)    Plan / Patient Education:  "    Continue with initial plan of care.  Progress with home program as tolerated.     Next session: review HEP, continue with MedX DE (cervical and lumbar), rotary torso, possible MFR or cervical joint mobs for cervical spine, progress mobility exercises    Subjective:     Pain Ratin  Pt says that he may have had a \"setback week.\" He does not recall doing anything out of the ordinary, but has been noticing more work related stress.    Objective:     Less stiffness with cervical ROM after MedX and MET (rotation)    Moderate tenderness with PAs to cervical spine, most at C4-5    Fatigues more appropriately with cervical and lumbar MedX DE    Lumbar MedX Initial testin17 4-week Re-test: 17   AROM (full=  0-72  lumbar) 0-51 deg 0-60 deg   Max Extension Torque  203# 329#   Average Extension Torque 146# 278#   Flex: ext ratio (ideal 1.4:1) 3.29:1 1.61:1       Cervical MedX Initial testin17 4-week Re-test: 17   AROM (full= 0-120  cervical)  deg  deg   Max Extension Torque  174# 500#   Flex: ext ratio (ideal 1.4:1) 2.72:1 3.47:1     Cervical ROM   Date: 17    *Indicate scale AROM AROM AROM   Cervical Flexion 30 deg 40 deg    Cervical Extension 20  Deg pain and sting 35 deg     Right Left Right Left Right Left   Cervical Sidebending 10 deg 10 deg       Cervical Rotation 28 deg muscle pull 30 deg muscle pull 45 deg 35 deg       Exercises:  Exercise #1: Cervical ROM: HEP  Comment #1: Cervical Rotation Snag: x5 B, HEP  Exercise #2: Chin Tuck: HEP  Comment #2: X 10  Exercise #3: LTR: HEP  Comment #3: X 10  Exercise #4: Piriformis Stretch: HEP  Comment #4: X 30 seconds  Exercise #5: Femoral Nerve Glide: HEP  Comment #5: Rotary torso: started R,40#, 90 seconds  Exercise #6: Bridges  Comment #6: reviewed, bridge  Exercise #7: TA   Comment #7: X 30  Exercise #8: Cervical Isometrics: reviewed  Comment #8: Reach and Roll: x10 B, HEP  Exercise #9: 4 way neck: SH 5, x20 " each direction 30# (not performed today)    Treatment Today     TREATMENT MINUTES COMMENTS   Evaluation     Self-care/ Home management     Manual therapy 10 In supine:  - grade 2 and 3 PAs to C3-6 and thoracic spine  - MET with cervical rotation B x5 with 5 sec holds   Neuromuscular Re-education     Therapeutic Activity     Therapeutic Exercises 20 See exercise and MedX flowsheets. Added reach and roll to HEP.    Gait training     Modality__________________                Total 30    Blank areas are intentional and mean the treatment did not include these items.       Emory Gallegos, PT, DPT  7/13/2017

## 2021-06-11 NOTE — PROGRESS NOTES
[facility] Daily Progress     Patient Name: Jose Zuniga  Date: 6/26/2017  Visit #: 7  PTA visit #:    Referral Diagnosis:   724.2 (ICD-9-CM) - M54.5 (ICD-10-CM) - Lumbalgia   724.4 (ICD-9-CM) - M54.16 (ICD-10-CM) - Lumbar radiculitis   722.10 (ICD-9-CM) - M51.26 (ICD-10-CM) - Lumbar disc herniation   729.1 (ICD-9-CM) - M79.1 (ICD-10-CM) - Myofascial pain   723.1 (ICD-9-CM) - M54.2 (ICD-10-CM) - Cervicalgia   723.4 (ICD-9-CM) - M54.12 (ICD-10-CM) - Cervical radiculitis   780.50 (ICD-9-CM) - G47.9 (ICD-10-CM) - Sleep disturbance   721.0 (ICD-9-CM) - M46.92 (ICD-10-CM) - Facet arthritis of cervical region     Referring provider: Rosa Fagan PA-C  Visit Diagnosis:     ICD-10-CM    1. Chronic bilateral low back pain without sciatica M54.5     G89.29    2. Lumbar radiculopathy M54.16    3. Neck pain M54.2    4. Cervical radiculitis M54.12    5. Decreased ROM of neck R29.898          Assessment:     Patient is benefitting from skilled physical therapy and is making steady progress toward functional goals.  Patient is appropriate to continue with skilled physical therapy intervention, as indicated by initial plan of care.     Pt continues to perform high numbers of repetitions with MedX, but more challenged today. Pt has been feeling improvements and evident improvements with his mobility. Pt able to tolerate 4 way neck, but significant limitations in neck ROM. Pt has been compliant with HEP and performing regularly.     Goal Status:  Pt. will be independent with home exercise program in : 4 weeks;Progressing toward  Pt. will report decreased intensity, frequency of : Pain;in 4 weeks;Comment;Progressing toward  Comment:: 50%  Pt [place] in [place] in 6 weeks: (In progress)  Pt will: be able to ride his bike without increase in pain in 6 weeks: (In progress)  Pt will: be able to play with his nephew without a flare in back pain in 6 weeks: (In progress)    Plan / Patient  "Education:     Continue with initial plan of care.  Progress with home program as tolerated.     Next session: review HEP, continue with MedX DE (cervical and lumbar), rotary torso,, adjust MedX DE (increase weight by 15-20+ #'s for each machine), possible MFR or cervical joint mobs for cervical spine.    Subjective:     Pain Ratin  Pt shares that he has been feeling better. He did not feel a \"lock up\" after last session. He feels that he is slowly starting to see some progress.    Objective:     Increased weight on lumbar and cervical MEDX significantly for improved fatigue level- continued high reps to fatigue.  Addition of 4 way neck with SB- limited motion and some discomfort with this compared to the other machines- reports as tolerable.    Lumbar MedX Initial testin17 4-week Re-test   AROM (full=  0-72  lumbar) 0-51 deg    Max Extension Torque  203#    Average Extension Torque 146#    Flex: ext ratio (ideal 1.4:1) 3.29:1        Cervical MedX Initial testin17 4-week Re-test   AROM (full= 0-120  cervical)  deg    Max Extension Torque  174#    Flex: ext ratio (ideal 1.4:1) 2.72:1      Cervical ROM   Date: 17    *Indicate scale AROM AROM AROM   Cervical Flexion 30 deg 40 deg    Cervical Extension 20  Deg pain and sting 35 deg     Right Left Right Left Right Left   Cervical Sidebending 10 deg 10 deg       Cervical Rotation 28 deg muscle pull 30 deg muscle pull 45 deg 35 deg       Exercises:  Exercise #1: Cervical ROM: HEP  Comment #1: Cervical Rotation Snag: x5 B, HEP  Exercise #2: Chin Tuck: HEP  Comment #2: X 10  Exercise #3: LTR: HEP  Comment #3: X 10  Exercise #4: Piriformis Stretch: HEP  Comment #4: X 30 seconds  Exercise #5: Femoral Nerve Glide: HEP  Comment #5: Rotary torso: started L, 38#, 90 seconds  Exercise #6: Bridges  Comment #6: X 10   Exercise #7: TA [place] March  Comment #7: X 30  Exercise #8: Cervical Isometrics: reviewed  Comment #8: X 5  Exercise #9: 4 way " neck: SH 5, x20 each direction 30#    Treatment Today     TREATMENT MINUTES COMMENTS   Evaluation     Self-care/ Home management     Manual therapy     Neuromuscular Re-education     Therapeutic Activity     Therapeutic Exercises 30 See exercise and MedX flowsheets. No changes to HEP.   Gait training     Modality__________________                Total 33    Blank areas are intentional and mean the treatment did not include these items.       Emory Gallegos, PT, DPT  6/26/2017

## 2021-06-11 NOTE — PROGRESS NOTES
Assessment / Plan:     Diagnoses and all orders for this visit:    Cervicalgia    Cervical facet joint syndrome    Lumbalgia  -     OPS TFESI Lumbar Sacral Unilateral; Future; Expected date: 6/7/17    Lumbar radiculitis  -     OPS TFESI Lumbar Sacral Unilateral; Future; Expected date: 6/7/17    Lumbar disc herniation  -     OPS TFESI Lumbar Sacral Unilateral; Future; Expected date: 6/7/17    Myofascial pain    Cervical radiculitis    Sleep disturbance  -     OPS TFESI Lumbar Sacral Unilateral; Future; Expected date: 6/7/17          Jose Zuniga is a 32 y.o. y.o. male with past medical history significant for allergic rhinitis, Crohn's disease, anemia, who presents today for new patient evaluation of acute left-sided low back pain radiating to the left hip, left groin, left latral thigh, and stops below the knee.  Lumbar MRI done on May 23 of 2017 shows at the L5-S1 severe bilateral facet arthropathy and neural foraminal narrowing that is greater on the left that have progressed since the prior lumbar MRI that could probably contribute to patient's symptoms of the left radicular pain to the left lateral thigh, affecting the L5 nerve root.  Lumbar MRI shows no significant posterior annular bulge or disc herniation at the L1-L2 that could explain patient's symptoms of the left groin pain.  I recommend left hip x-ray to evaluate for hip pathology of  groin pain.  I recommend left L5-S1 transforaminal epidural steroid injection.  If he does not improve with left L5-S1 I recommend left L3-L4 transforaminal epidural steroid injection.    With respect to the cervicalgia.  Patient's symptoms are more consistent with myofascial pain.  Cervical MRI shows no significant foraminal stenosis, and no cord signal abnormality and no spinal canal stenosis.  I recommend patient to continue with physical therapy, and spinal manipulation.  I recommend patient to continue on Flexeril 10 mg, 1/2 a tablet at nighttime.       A shared  decision making plan was used.  The patient's values and choices were respected.  The following represents what was discussed and decided upon by the physician and the patient.      1.  DIAGNOSTIC TESTS: I reviewed the lumbar MRI imaging and the report with the patient today.  He verbalizes understanding.  2.  PHYSICAL THERAPY: Patient will continue on physical therapy [facility] program.  3.  MEDICATIONS: Patient will continue on Flexeril 10 mg half a tablet at nighttime.  Gabapentin 300 mg 1 tablet 3 times daily.   4.  INTERVENTIONS: Left L5-S1 transforaminal epidural steroid injection.    5.  PATIENT EDUCATION: I thoroughly discussed the plan with the patient today. he verbalizes understanding and agrees with the plan.  6.  FOLLOW-UP: Patient will follow up with me in 3  weeks.  All questions were answered.      CHAYA Eller, MPA-C      Subjective:     Jose Zuniga is a 32 y.o. male who presents today for follow-up regarding Patient reports left-sided low back pain radiating to the left lateral hip, left groin and the left lateral thigh.  Lumbar MRI done on May 23 of 2017, shows at the L5-S1 there is a severe bilateral facet arthropathy with bilateral foraminal narrowing that is greater on the left that has progressed since his previous MRI.  There is also stable moderate narrowing of the left neural foramen at the L3-L4.  Today patient states that he recalls a fall and slipping and falling on his back and hitting the back of his head back in July 2016 where he had to go to [facility], with a quick and had a x-ray of the left hip that showed no fracture or dislocation.  Today he reports the low back pain radiating still to the left groin and to the lateral side of his thigh radiating just little bit below the knee.  Patient states when he gets the shooting pain he is unable to walk.  At this time he reports 4-5 out of 10 intensity pain at the left lower back radiating to the left lower extremity.  His  symptoms are aggravated by walking getting up from a sitting position and rates it at 8 out of 10 intensity on its worst.  He has been taking gabapentin 3 mg 1 tablet 3 times daily and Flexeril 10 mg as needed at nighttime and rates his pain at 2 out of 10 intensity on its best.  Patient stated that he saw Dr. Dumont and he is pleased with the results for the neck treatment.  He started physical therapy with [facility] and he started seeing progress of his symptoms.      He still reports bilateral neck pain radiating to the upper trapezius bilaterally but does not radiate to the arms or to the hands.  Patient stated that his neck pain has improved with spinal manipulation with Dr. Dumont and with physical therapy with [facility].  He would like to continue with physical therapy and spinal manipulation.  Cervical MRI done on May 23 of 2017 shows mild left foraminal stenosis at the C2-C3, C4-C5, C5-C6, and mild right foraminal narrowing at the C3-C4.  There is no spinal canal stenosis or cord signal abnormalities.  There is some mild facet hypertrophy at the C2-C3, C3-C4.    Patient denies urinary or bowel incontinence, unintentional weight loss, saddle anesthesia, fever or chills, balance difficulties.      Review of Systems:  Bilateral neck pain, left-sided low back pain radiating to the left lower extremity.Patient denies urinary or bowel incontinence, unintentional weight loss, saddle anesthesia, fever or chills, balance difficulties.       Objective:   CONSTITUTIONAL:  Vital signs as above.  No acute distress.  The patient is well nourished and well groomed.    PSYCHIATRIC:  The patient is awake, alert, oriented to person, place and time.  The patient is answering questions appropriately with clear speech.  Normal affect.  SKIN:  Skin over the face, posterior torso, bilateral upper and lower extremities is clean, dry, intact without rashes.  MUSCULOSKELETAL:  Gait is non-antalgic.  The patient is able to heel and  toe walk without any difficulty.  Mild tenderness over the left L4-L5, L5-S1 lumbar paraspinal muscles.      The patient has 5/5 strength for the bilateral hip flexors, knee flexors/extensors, ankle dorsiflexors/plantar flexors, ankle evertors/invertors.    NEUROLOGICAL:  2/4 patellar, medial hamstring, achilles reflexes which are symmetric bilaterally.  No ankle clonus bilaterally.  Sensation to light touch is intact in the bilateral L4, L5, and S1 dermatomes.    Negative hip impingement bilaterally.  Negative Eran test bilaterally.  Positive left straight leg raise.    MUSCLE STRENGTH:  5/5 strength for the bilateral shoulder abductors, elbow flexors/extensors, wrist extensors, finger flexors/abductors.  NEURO:  CN III-XII are grossly intact.  2/4 symmetric biceps, brachioradialis, triceps reflexes bilaterally.  Sensation to light touch intact.  Negative Schwab's bilaterally.    VASCULAR:  2/4 radial pulses bilaterally.  Warm upper limbs bilaterally.  Capillary refill in the upper extremities is less than 1 second.  MUSCULOSKELETAL: Tenderness at the right upper trapezius present.  Mild tenderness at the right upper trapezius with mild decreased range of motion of the cervical spine due to muscle spasm.         RESULTS:      [facility]  MR CERVICAL SPINE WO CONTRAST  5/23/2017 4:53 PM      INDICATION: Neck pain, normal neuro exam, prior x-ray.  TECHNIQUE: Routine.  CONTRAST: None.  COMPARISON: None.     FINDINGS: Normal vertebral body heights, alignment and marrow signal. Normal appearance of the craniocervical junction and C1-C2. No abnormal cord signal. The visualized intracranial contents are unremarkable. Grossly normal paraspinal soft tissues. The   vertebral artery flow voids are preserved.     C2-C3: Normal disc height. No herniation. Mild facet hypertrophy. No spinal canal stenosis. No right foraminal stenosis. Minimal left foraminal narrowing.     C3-C4: Diminished T2 signal without  significant loss of height. No significant annular bulge. Mild facet hypertrophy. No spinal canal stenosis. Mild right foraminal stenosis. No left foraminal stenosis.     C4-C5: Loss of T2 signal loss of height. No significant annular bulge. There is slight osteophytic endplate irregularity and uncinate spurring. There is mild facet hypertrophy, primarily on the left. No significant posterior annular bulge or spinal canal   stenosis. There is mild left foraminal stenosis. No right foraminal stenosis.     C5-C6: Diminished T2 signal without significant loss of height. No significant annular bulge. No significant facet hypertrophy. No significant effacement of the ventral aspect of the thecal sac or central canal stenosis. Mild left foraminal stenosis. No   right foraminal stenosis.     C6-C7: Normal disc height. No herniation. No facet arthropathy. No spinal canal stenosis. No right neural foraminal stenosis. No left neural foraminal stenosis.     C7-T1: Normal disc height. No herniation. No facet arthropathy. No spinal canal stenosis. No right neural foraminal stenosis. No left neural foraminal stenosis.     IMPRESSION:   CONCLUSION:  1.  No cord signal abnormality.  2.  Mild left foraminal stenosis C5-C6.  3.  Mild left foraminal stenosis C4-C5.  4.  Mild right foraminal stenosis C3-C4.  5.  Minimal left foraminal narrowing C2-C3.  6.  No spinal canal stenosis.  7.  Scattered facet hypertrophy and slight uncinate spurring. No significant focal disc herniation or annular bulge.      [facility]  MR LUMBAR SPINE WO CONTRAST  5/23/2017 4:53 PM      INDICATION: Low back pain, >6 weeks / red flag(s) / radiculopathy.  TECHNIQUE: Routine.  CONTRAST: None.  COMPARISON: MRI 6/5/2015.     FINDINGS: Nomenclature is based on 5 lumbar type vertebral bodies. As seen on the prior examination, there is minimal stable chronic anterior wedging L1 and L2. Numerous Schmorl's nodes are identified. No acute appearing compression  fractures are   identified. No pars defect. The conus tip is identified at L1-L2. Grossly normal paraspinal soft tissues. No abdominal aortic aneurysm. The visualized portions of the bony pelvis are normal for age.     T12-L1: Advanced disc degeneration with some endplate irregularity but no significant posterior annular bulge. Mild facet hypertrophy. No central canal or neural foraminal stenosis.     L1-L2: Loss of T2 signal and loss of height. No significant posterior annular bulge or disc herniation. Bilateral facet hypertrophy. No accompanying central canal or neural foraminal stenosis.      L2-L3: Near-complete obliteration of the interspace. Fusion of the facet joints, also noted on the prior study but more conspicuous. No significant annular bulge. No central canal or neural foraminal stenosis.     L3-L4: Advanced disc degeneration. Advanced facet hypertrophy. No significant annular bulge. No central canal stenosis. Stable moderate narrowing of the left neural foramen. No right-sided foraminal narrowing.     L4-L5: Diminished T2 signal without significant loss of height. Severe facet hypertrophy. No significant protrusion with mild broad-based bulge. No central canal stenosis. Mild right foraminal narrowing has developed since the previous examination. No   significant left-sided foraminal narrowing.     L5-S1: Loss of T2 signal without significant loss of height. No significant disc protrusion. There is severe bilateral facet hypertrophy. No central canal stenosis. Mild bilateral foraminal narrowing, greater on the left, has developed/progressed since   the previous exam.     IMPRESSION:   CONCLUSION:  1.  Degenerative and spondylotic changes throughout the lumbar spine.  2.  Stable minimal anterior wedging of the anterior aspect of L1 and L2.  3.  Mild bilateral foraminal narrowing, greater on the left, has developed/progressed since the prior examination at L5-S1.  4.  Mild right foraminal narrowing at  L4-L5 has progressed/developed since the previous examination.  5.  Stable moderate narrowing of the left neural foramen L3-L4.  6.  No significant central canal stenosis.       Over 40 minutes spent face-to-face with patient, over 30 minutes spent on counseling and coordinating of care.

## 2021-06-11 NOTE — PROGRESS NOTES
SUBJECTIVE:   Jose Zuniga is a 32 y.o. male [facility].  Patient reports that he still feels the same neck and lower back.  He reports that spinal manipulation and physical therapy is still helping his neck and lower back.  Today, he rates his pain a 4/10.      PROCEDURE:  32 y.o. male with occipital, cervical, thoracic, lumbar, sacral, and sacroiliac somatic dysfunctions.      PRE-PROCEDURE DIAGNOSIS: Neck pain, cervical facet syndrome, and chronic lower back pain      PROCEDURE PERFORMED: joint manipulations.      Brief Procedure: The patient understands the risks and benefits of spinal manipulations      Procedure:  The patient has pain, tenderness and spasm at occipital, cervical, thoracic and lumbosacral regions.  Left occipital PS, C4, C5, T3, T4, T6, L1, L3, L5, right sacral base and right SI joint malrotated which are determined by static and motion palpations. Patient was placed on table supine and prone. The occipital, cervical, thoracic, lumbar, sacral, and sacroiliac were manipulated by drop table and hands.  Scalene stretches applied bilaterally.  The patient tolerated the manipulations, scalene stretch without any complications.    DIAGNOSIS:  Diagnoses and all orders for this visit:    Myofascial pain    Chronic bilateral low back pain without sciatica    Segmental and somatic dysfunction of head region    Cervical segment dysfunction    Thoracic region somatic dysfunction    Segmental dysfunction of lumbar region    Sacral region somatic dysfunction    Sacroiliac joint somatic dysfunction    Facet arthritis of cervical region      DISCUSSION/PLAN:  This is a 32 y.o. male with medical history significant of lumbar radiculopathy, Crohn's colitis, tobacco abuse, right shoulder arthritis, chronic lower back pain who presents today for spinal manipulation.  Patient is slowly improving with neck range of motion and pain in the neck and back. 6 regions were  manipulated.     Plan:    Home instructions: Heat QID for 10-15 minutes and OTC NSAID prn.    Patient will follow up this week for spinal manipulations.    Frequency:1 more spinal manipulation        Marita Dumont DC, RAOUL, PA-C

## 2021-06-11 NOTE — PROGRESS NOTES
"Jose Zuniga is a 35 y.o. male who is being evaluated via a billable video visit.      The patient has been notified of following:     \"This video visit will be conducted via a call between you and your physician/provider. We have found that certain health care needs can be provided without the need for an in-person physical exam.  This service lets us provide the care you need with a video conversation.  If a prescription is necessary we can send it directly to your pharmacy.  If lab work is needed we can place an order for that and you can then stop by our lab to have the test done at a later time.    Video visits are billed at different rates depending on your insurance coverage. Please reach out to your insurance provider with any questions.    If during the course of the call the physician/provider feels a video visit is not appropriate, you will not be charged for this service.\"    Patient has given verbal consent to a Video visit? Yes  How would you like to obtain your AVS? AVS Preference: [employer].  If dropped by the video visit, the video invitation should be sent to: Text to cell phone: [phone]  Will anyone else be joining your video visit? No        Video-Visit Details    Type of service:  Video Visit    Originating Location (pt. Location): Home    Distant Location (provider location):  [facility]     Platform used for Video Visit: [employer]      ASSESSMENT AND PLAN:    Diagnoses and all orders for this visit:    Ankylosis of sacroiliac joint    Spondylosis of cervical region without myelopathy or radiculopathy    Neck Pain    Crohn's disease of colon with other complication (H)          HISTORY OF PRESENTING ILLNESS:  Jose Zuniga 35 y.o. is evaluated here via video link.  This is for follow-up.  He has ankylosing spondylitis in association with Crohn's, causing ankylosis of the sacroiliac joints bilaterally, he has Stelara and methotrexate on board from his gastroenterologist, in the past " Remicade, Humira were effective but lost control as he developed autoantibodies.  Pain and stiffness in his neck.  This is it is best first thing in the morning even then he has some difficulty range of motion but as the day goes by this gets worse and worse.  This is been less so for the lumbar spine.  In that area he has had a corticosteroid injection several years ago that toward given relief for up to 2 years.  He was seen in spine clinic for cervicalgia, physical therapy was recommended, it was deemed not a surgical situation.  He has not had involvement of other joints in the lower extremities.  None in the upper extremities.  Today we had a detailed discussion we discussed how both the Crohn's related spondyloarthropathy, cervical degenerative changes can cause such symptoms.  There can be some disparity in occasional patient between radiologically mild and clinically more significant symptoms. On balance at this point 1 option for him to consider is to go for spine clinic visit again and discussed with him the possibility of a corticosteroid injection and the speedy spine joints might be feasible.  Another one is to try tramadol.  Other options for ankylosing spondylitis such as IL-17 inhibitors including Cosentyx and taltz would not be preferred given his background of Crohn's.  ROS enquiry held for fever, ocular symptoms, rash, headache,  GI issues.  He has not found duloxetine to be helpful he will taper and stop discontinue this.  We will meet here in 3 months.  We discussed CBD products.  Today we also discussed the issues related to the current pandemic, the pros and cons of the current treatment plan, the CDC guidelines such as social distancing washing the hands covering the cough.  ALLERGIES:Patient has no known allergies.    PAST MEDICAL/ACTIVE PROBLEMS/MEDICATION/SOCIAL DATA  Past Medical History:   Diagnosis Date     Chronic back pain      Chronic neck pain      Crohn's disease (H)      Eczema      mild, as a child     Hypertension      Shingles      Social History     Tobacco Use   Smoking Status Never Smoker   Smokeless Tobacco Former User     Types: Chew   Tobacco Comment    No exposure     Patient Active Problem List   Diagnosis     Allergic Rhinitis     Localized Primary Osteoarthritis Of The Right Shoulder Glenohumeral Joint     Lower Back Pain     Neck Pain     Cervical Radiculopathy     Lumbar radiculopathy     Crohn's colitis (H)     Anemia     Osteoarthritis of lumbar spine     Osteoarthritis cervical spine     Family history of pulmonary embolism     Ankylosis of sacroiliac joint     Benign essential hypertension     Current Outpatient Medications   Medication Sig Dispense Refill     amLODIPine (NORVASC) 5 MG tablet TAKE 1 TABLET (5MG TOTAL) BY MOUTH ONCE DAILY 30 tablet 3     calcium, as carbonate, (OS-FAUSTINO) 500 mg calcium (1,250 mg) tablet Take 1 tablet (500 mg total) by mouth 2 (two) times a day.  0     DULoxetine (CYMBALTA) 30 MG capsule Take 1 capsule (30 mg total) by mouth 2 (two) times a day. 180 capsule 0     folic acid (FOLVITE) 1 MG tablet Take 1,000 mcg by mouth daily.  11     losartan (COZAAR) 100 MG tablet Take 1 tablet (100 mg total) by mouth daily. 90 tablet 1     methotrexate 2.5 MG tablet Take 2.5 tablets by mouth once a week. Take 5 tabs once a week       multivitamin with minerals (THERA-M) 9 mg iron-400 mcg Tab tablet Take 1 tablet by mouth daily as needed.        STELARA 90 mg/mL Syrg        No current facility-administered medications for this visit.          EXAMINATION:    Using the audio and video link as best as possible the constitutional, neck, neurologic, psych, skin, both upper extremities areas/organ system were evaluated during this assessment.  Some of the important findings: He is alert and oriented, C-spine range of motion appears to be somewhat restricted, hands without dactylitis.      LAB / IMAGING DATA:  ALT   Date Value Ref Range Status   08/14/2020 38 0 -  45 U/L Final   01/13/2020 49 (H) 0 - 45 U/L Final   04/22/2019 13 0 - 45 U/L Final     Albumin   Date Value Ref Range Status   08/14/2020 3.7 3.5 - 5.0 g/dL Final   01/13/2020 3.5 3.5 - 5.0 g/dL Final   04/22/2019 3.1 (L) 3.5 - 5.0 g/dL Final     Creatinine   Date Value Ref Range Status   08/14/2020 1.02 0.70 - 1.30 mg/dL Final   05/27/2020 1.01 0.70 - 1.30 mg/dL Final   01/27/2020 0.65 (L) 0.70 - 1.30 mg/dL Final       WBC   Date Value Ref Range Status   08/14/2020 6.1 4.0 - 11.0 thou/uL Final   01/13/2020 6.2 4.0 - 11.0 thou/uL Final   08/28/2015 10.6 4.0 - 11.0 thou/uL Final   08/27/2015 11.5 (H) 4.0 - 11.0 thou/uL Final     Hemoglobin   Date Value Ref Range Status   08/14/2020 14.7 14.0 - 18.0 g/dL Final   01/13/2020 13.7 (L) 14.0 - 18.0 g/dL Final   04/22/2019 13.2 (L) 14.0 - 18.0 g/dL Final     Platelets   Date Value Ref Range Status   08/14/2020 341 140 - 440 thou/uL Final   01/13/2020 307 140 - 440 thou/uL Final   04/22/2019 415 140 - 440 thou/uL Final       Lab Results   Component Value Date    RF <15.0 01/13/2020    SEDRATE 10 01/13/2020     Duration of the call:19  Minutes  Start: 08/31/2020 03:59 pm   Stop: 08/31/2020 04:18 pm

## 2021-06-11 NOTE — PROGRESS NOTES
[facility] Daily Progress     Patient Name: Jose Zuniga  Date: 6/21/2017  Visit #: 5  PTA visit #:    Referral Diagnosis:   724.2 (ICD-9-CM) - M54.5 (ICD-10-CM) - Lumbalgia   724.4 (ICD-9-CM) - M54.16 (ICD-10-CM) - Lumbar radiculitis   722.10 (ICD-9-CM) - M51.26 (ICD-10-CM) - Lumbar disc herniation   729.1 (ICD-9-CM) - M79.1 (ICD-10-CM) - Myofascial pain   723.1 (ICD-9-CM) - M54.2 (ICD-10-CM) - Cervicalgia   723.4 (ICD-9-CM) - M54.12 (ICD-10-CM) - Cervical radiculitis   780.50 (ICD-9-CM) - G47.9 (ICD-10-CM) - Sleep disturbance   721.0 (ICD-9-CM) - M46.92 (ICD-10-CM) - Facet arthritis of cervical region     Referring provider: Rosa Fagan PA-C  Visit Diagnosis:     ICD-10-CM    1. Chronic bilateral low back pain without sciatica M54.5     G89.29    2. Lumbar radiculopathy M54.16    3. Neck pain M54.2    4. Cervical radiculitis M54.12    5. Decreased ROM of neck R29.898          Assessment:     Patient is benefitting from skilled physical therapy and is making steady progress toward functional goals.  Patient is appropriate to continue with skilled physical therapy intervention, as indicated by initial plan of care.     Pt continues to have significant cervical and lumbar stiffness today. He did show some improvements in cervical mobility with assessment last session. Increased weight on MEDX today was tolerated well, but could benefit from continued increase in weight to achieve proper fatigue. Pt able to tolerate DE lumbar and rotary torso without pain. Pt feeling less tight after machines, but pain persisted. We did add SB strengthening on 4 way neck as well as some core strengthening for his HEP which he tolerated well. Pt continues to be motivated and will benefit from continuing PT and MedX program.     Goal Status:  Pt. will be independent with home exercise program in : 4 weeks;Progressing toward  Pt. will report decreased intensity, frequency of : Pain;in 4 weeks;Comment;Progressing  toward  Comment:: 50%  [place] in [place] in 6 weeks: (In progress)  Pt will: be able to ride his bike without increase in pain in 6 weeks: (In progress)  Pt will: be able to play with his nephew without a flare in back pain in 6 weeks: (In progress)    Plan / Patient Education:     Continue with initial plan of care.  Progress with home program as tolerated.     Next session: review HEP, continue with MedX DE (cervical and lumbar), rotary torso, review HEP PRN, adject MedX DE (increase weight by 15-20+ #'s for each machine), possible MFR or cervical joint mobs for cervical spine.    Subjective:     Pain Rating: 3    Had L5-S1 injection yesterday. It has been good the last couple of days. After PT days, he feels like he stiffens up in the evening.     Objective:     Increased weight on lumbar and cervical MEDX significantly for improved fatigue level- continued high reps to fatigue.  Addition of 4 way neck with SB- limited motion and some discomfort with this compared to the other machines- reports as tolerable.    Lumbar MedX Initial testin17 4-week Re-test   AROM (full=  0-72  lumbar) 0-51 deg    Max Extension Torque  203#    Average Extension Torque 146#    Flex: ext ratio (ideal 1.4:1) 3.29:1        Cervical MedX Initial testin17 4-week Re-test   AROM (full= 0-120  cervical)  deg    Max Extension Torque  174#    Flex: ext ratio (ideal 1.4:1) 2.72:1      Cervical ROM   Date: 17    *Indicate scale AROM AROM AROM   Cervical Flexion 30 deg 40 deg    Cervical Extension 20  Deg pain and sting 35 deg     Right Left Right Left Right Left   Cervical Sidebending 10 deg 10 deg       Cervical Rotation 28 deg muscle pull 30 deg muscle pull 45 deg 35 deg       Treatment Today     TREATMENT MINUTES COMMENTS   Evaluation     Self-care/ Home management     Manual therapy     Neuromuscular Re-education     Therapeutic Activity     Therapeutic Exercises 33 See  exercise and MedX flowsheets.    Gait training     Modality__________________                Total 33    Blank areas are intentional and mean the treatment did not include these items.       J Luis LAURA, PT, DPT  6/21/2017

## 2021-06-12 NOTE — PROGRESS NOTES
[facility] Daily Progress     Patient Name: Jose Zuniga  Date: 7/26/2017  Visit #: 10  PTA visit #:    Referral Diagnosis:   724.2 (ICD-9-CM) - M54.5 (ICD-10-CM) - Lumbalgia   724.4 (ICD-9-CM) - M54.16 (ICD-10-CM) - Lumbar radiculitis   722.10 (ICD-9-CM) - M51.26 (ICD-10-CM) - Lumbar disc herniation   729.1 (ICD-9-CM) - M79.1 (ICD-10-CM) - Myofascial pain   723.1 (ICD-9-CM) - M54.2 (ICD-10-CM) - Cervicalgia   723.4 (ICD-9-CM) - M54.12 (ICD-10-CM) - Cervical radiculitis   780.50 (ICD-9-CM) - G47.9 (ICD-10-CM) - Sleep disturbance   721.0 (ICD-9-CM) - M46.92 (ICD-10-CM) - Facet arthritis of cervical region     Referring provider: Rosa Fagan PA-C  Visit Diagnosis:     ICD-10-CM    1. Chronic bilateral low back pain without sciatica M54.5     G89.29    2. Lumbar radiculopathy M54.16    3. Neck pain M54.2    4. Cervical radiculitis M54.12    5. Decreased ROM of neck R29.898          Assessment:     Patient is benefitting from skilled physical therapy and is making steady progress toward functional goals.  Patient is appropriate to continue with skilled physical therapy intervention, as indicated by initial plan of care.     Pt continues to tolerate MedX well and fatigues more appropriately. Pt feels he is starting to turn a corner, but continues to demonstrate significant stiffness. He does show improvements with  cervical MET and machines. Pt is progressing well, but slowly. Pt would benefit from further mobilizations to improve mobility if tolerated.    Goal Status:  Pt. will be independent with home exercise program in : 4 weeks;Progressing toward  Pt. will report decreased intensity, frequency of : Pain;in 4 weeks;Comment;Progressing toward  Comment:: 50%  [place] in [place] in 6 weeks: (In progress)  Pt will: be able to ride his bike without increase in pain in 6 weeks: (In progress)  Pt will: be able to play with his nephew without a flare in back pain in 6  weeks: (In progress)    Plan / Patient Education:     Continue with initial plan of care.  Progress with home program as tolerated.     Next session: review HEP, continue with MedX DE (cervical and lumbar), rotary torso, possible MFR or cervical joint mobs for cervical spine, progress mobility exercises    Subjective:     Pain Ratin    Feels like he is getting there, heading in the right direction. Hasn't been as painful either. The injection a month ago helped with the back. May have to try golfing soon as he is going to be doing this in a month for his bachelor party.    Objective:     Less stiffness with cervical ROM after MedX and MET (rotation)    Moderate tenderness with PAs to cervical spine, most at C4,5,5 felt on the right side.     Fatigues more appropriately with cervical and lumbar MedX DE    Lumbar MedX Initial testin17 4-week Re-test: 17   AROM (full=  0-72  lumbar) 0-51 deg 0-60 deg   Max Extension Torque  203# 329#   Average Extension Torque 146# 278#   Flex: ext ratio (ideal 1.4:1) 3.29:1 1.61:1       Cervical MedX Initial testin17 4-week Re-test: 17   AROM (full= 0-120  cervical)  deg  deg   Max Extension Torque  174# 500#   Flex: ext ratio (ideal 1.4:1) 2.72:1 3.47:1     Cervical ROM   Date: 17    *Indicate scale AROM AROM AROM   Cervical Flexion 30 deg 40 deg    Cervical Extension 20  Deg pain and sting 35 deg     Right Left Right Left Right Left   Cervical Sidebending 10 deg 10 deg       Cervical Rotation 28 deg muscle pull 30 deg muscle pull 45 deg 35 deg       Exercises:  Exercise #1: Cervical ROM: HEP  Comment #1: Cervical Rotation Snag: x5 B, HEP  Exercise #2: Chin Tuck: HEP  Comment #2: X 10  Exercise #3: LTR: HEP  Comment #3: X 10  Exercise #4: Piriformis Stretch: HEP  Comment #4: X 30 seconds  Exercise #5: Femoral Nerve Glide: HEP  Comment #5: Rotary torso: started L,44#, 90 seconds  Exercise #6: Bridges  Comment #6: reviewed,  bridge  Exercise #7: TA Deadbug March  Comment #7: X 30  Exercise #8: Cervical Isometrics: reviewed  Comment #8: Reach and Roll: x10 B, HEP  Exercise #9: 4 way neck: SH 5, x20 each direction 30# (not performed today)    Treatment Today     TREATMENT MINUTES COMMENTS   Evaluation     Self-care/ Home management     Manual therapy 10 In supine:  - grade 2 and 3 PAs unilateral and lateral PA's to C3-7 and thoracic spine  - MET with cervical rotation B x5 with 5 sec holds   Neuromuscular Re-education     Therapeutic Activity     Therapeutic Exercises 15 See exercise and MedX flowsheets.    Gait training     Modality__________________                Total 25    Blank areas are intentional and mean the treatment did not include these items.       J Luis LAURA, PT, DPT  7/26/2017

## 2021-06-13 NOTE — TELEPHONE ENCOUNTER
Addended by: LUIS FERNANDO AYALA on: 10/22/2020 10:57 AM     Modules accepted: Orders     [email or web address]    Please give pt a call to schedule his next appt. Thank you!    follow-up here in 6 months

## 2021-06-13 NOTE — PROGRESS NOTES
[facility] Discharge Summary  Patient Name: Jose Zuniga  Date: 9/22/2017  Referral Diagnosis:   724.2 (ICD-9-CM) - M54.5 (ICD-10-CM) - Lumbalgia   724.4 (ICD-9-CM) - M54.16 (ICD-10-CM) - Lumbar radiculitis   722.10 (ICD-9-CM) - M51.26 (ICD-10-CM) - Lumbar disc herniation   729.1 (ICD-9-CM) - M79.1 (ICD-10-CM) - Myofascial pain   723.1 (ICD-9-CM) - M54.2 (ICD-10-CM) - Cervicalgia   723.4 (ICD-9-CM) - M54.12 (ICD-10-CM) - Cervical radiculitis   780.50 (ICD-9-CM) - G47.9 (ICD-10-CM) - Sleep disturbance   721.0 (ICD-9-CM) - M46.92 (ICD-10-CM) - Facet arthritis of cervical region    Referring provider: Rosa Fagan PA-C  Visit Diagnosis:   1. Chronic bilateral low back pain without sciatica     2. Lumbar radiculopathy     3. Neck pain     4. Cervical radiculitis     5. Decreased ROM of neck         Goals:  Goal Status:  Pt. will be independent with home exercise program in : 4 weeks; Met  Pt. will report decreased intensity, frequency of : Pain;in 4 weeks;Comment  Comment:: 50% Met  Pt [place] in pain in 6 weeks: improved  Pt will: be able to ride his bike without increase in pain in 6 weeks: not assessed  Pt will: be able to play with his nephew without a flare in back pain in 6 weeks: improved    Patient was seen for 10 visits from 5/24/17 to 7/26/17 with 0 missed appointments.    The patient met goals and has demonstrated understanding of and independence in the home program for self-care, and progression to next steps.  He will initiate contact if questions or concerns arise.    Therapy will be discontinued at this time.  The patient will need a new referral to resume.    Thank you for your referral.  J Luis LAURA  9/22/2017  9:25 AM

## 2021-06-13 NOTE — PROGRESS NOTES
"Jose Zuniga is a 35 y.o. male who is being evaluated via a billable video visit.      The patient has been notified of following:     \"This video visit will be conducted via a call between you and your physician/provider. We have found that certain health care needs can be provided without the need for an in-person physical exam.  This service lets us provide the care you need with a video conversation.  If a prescription is necessary we can send it directly to your pharmacy.  If lab work is needed we can place an order for that and you can then stop by our lab to have the test done at a later time.    Video visits are billed at different rates depending on your insurance coverage. Please reach out to your insurance provider with any questions.    If during the course of the call the physician/provider feels a video visit is not appropriate, you will not be charged for this service.\"    Patient has given verbal consent to a Video visit? Yes  How would you like to obtain your AVS? AVS Preference: MyChart.  If dropped by the video visit, the video invitation should be sent to: Text to cell phone: [phone]  Will anyone else be joining your video visit? No            Video-Visit Details    Type of service:  Video Visit    Originating Location (pt. Location): Home    Distant Location (provider location):  [facility]     Platform used for Video Visit: [employer]       This document was created using a software with less than 100% fidelity, at times resulting in unintended, even erroneous syntax and grammar.  The reader is advised to keep this under consideration while reviewing, interpreting this note.    ASSESSMENT AND PLAN:    Diagnoses and all orders for this visit:    Ankylosis of sacroiliac joint    Spondylosis of cervical region without myelopathy or radiculopathy  -     DULoxetine (CYMBALTA) 30 MG capsule; Take 1 capsule (30 mg total) by mouth 2 (two) times a day.  Dispense: 180 capsule; " Refill: 1    Neck Pain          HISTORY OF PRESENTING ILLNESS:  Jose Zuniga 35 y.o. is evaluated here via video link.  This is for follow-up.  He has ankylosing spondylitis, sacroiliac, and the    Crohn's, radiologically milder but symptomatically more problematic DJD changes of the C-spine.  Duloxetine has helped with pain.  He still has stiffness in the neck with range of motion at the extremes.  There is no nocturnal awakenings because of this there is no radiation down the arms.  There is no involvement of peripheral joints such as lower extremities, upper extremities..  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  Pain and stiffness in his neck.  This is it is best first thing in the morning even then he has some difficulty range of motion but as the day goes by this gets worse and worse.  This is been less so for the lumbar spine.  In that area he has had a corticosteroid injection several years ago that toward given relief for up to 2 years.  He was seen in spine clinic for cervicalgia, physical therapy was recommended, it was deemed not a surgical situation.  He has not had involvement of other joints in the lower extremities.  None in the upper extremities.  Today we had a detailed discussion we discussed how both the Crohn's related spondyloarthropathy, cervical degenerative changes can cause such symptoms.  There can be some disparity in occasional patient between radiologically mild and clinically more significant symptoms. On balance at this point 1 option for him to consider is to go for spine clinic visit again and discussed with him the possibility of a corticosteroid injection and the speedy spine joints might be feasible.  Another one is to try tramadol.  Other  options for ankylosing spondylitis such as IL-17 inhibitors including Cosentyx and taltz would not be preferred given his background of Crohn's.  He is to follow-up here in 6 months.  ROS enquiry held for fever, ocular symptoms, rash, headache,  GI issues.  Today we also discussed the issues related to the current pandemic, the pros and cons of the current treatment plan, the CDC guidelines such as social distancing washing the hands covering the cough.  ALLERGIES:Patient has no known allergies.    PAST MEDICAL/ACTIVE PROBLEMS/MEDICATION/SOCIAL DATA  Past Medical History:   Diagnosis Date     Chronic back pain      Chronic neck pain      Crohn's disease (H)      Eczema     mild, as a child     Hypertension      Shingles      Social History     Tobacco Use   Smoking Status Never Smoker   Smokeless Tobacco Former User     Types: Chew   Tobacco Comment    No exposure     Patient Active Problem List   Diagnosis     Allergic Rhinitis     Localized Primary Osteoarthritis Of The Right Shoulder Glenohumeral Joint     Lower Back Pain     Neck Pain     Cervical Radiculopathy     Lumbar radiculopathy     Crohn's colitis (H)     Anemia     Osteoarthritis of lumbar spine     Osteoarthritis cervical spine     Family history of pulmonary embolism     Ankylosis of sacroiliac joint     Benign essential hypertension     Current Outpatient Medications   Medication Sig Dispense Refill     amLODIPine (NORVASC) 5 MG tablet TAKE 1 TABLET (5MG TOTAL) BY MOUTH ONCE DAILY 30 tablet 3     calcium, as carbonate, (OS-FAUSTINO) 500 mg calcium (1,250 mg) tablet Take 1 tablet (500 mg total) by mouth 2 (two) times a day.  0     DULoxetine (CYMBALTA) 30 MG capsule Take 1 capsule (30 mg total) by mouth 2 (two) times a day. 180 capsule 0     folic acid (FOLVITE) 1 MG tablet Take 1,000 mcg by mouth daily.  11     losartan (COZAAR) 100 MG tablet Take 1 tablet (100 mg total) by mouth daily. 90 tablet 1     methotrexate 2.5 MG tablet Take 2.5 tablets by mouth  once a week. Take 5 tabs once a week       multivitamin with minerals (THERA-M) 9 mg iron-400 mcg Tab tablet Take 1 tablet by mouth daily as needed.        STELARA 90 mg/mL Syrg        No current facility-administered medications for this visit.          EXAMINATION:    Using the audio and video link as best as possible the constitutional, neck, neurologic, psych, skin, both upper extremities areas/organ system were evaluated during this assessment.  Some of the important findings: Alert, oriented, speech fluent.   Able to fully flex the digits, into fists bilaterally, wrist and elbow elbow range of motion appear normal, abduction of the shoulder is normal.  C-spine he is able to do the lateral movements nearly completely although he feels that these are not to the point where he used to have.  Extension is reduced flexion is full.  There is no dactylitis of the digits.      LAB / IMAGING DATA:  ALT   Date Value Ref Range Status   08/14/2020 38 0 - 45 U/L Final   01/13/2020 49 (H) 0 - 45 U/L Final   04/22/2019 13 0 - 45 U/L Final     Albumin   Date Value Ref Range Status   08/14/2020 3.7 3.5 - 5.0 g/dL Final   01/13/2020 3.5 3.5 - 5.0 g/dL Final   04/22/2019 3.1 (L) 3.5 - 5.0 g/dL Final     Creatinine   Date Value Ref Range Status   08/14/2020 1.02 0.70 - 1.30 mg/dL Final   05/27/2020 1.01 0.70 - 1.30 mg/dL Final   01/27/2020 0.65 (L) 0.70 - 1.30 mg/dL Final       WBC   Date Value Ref Range Status   08/14/2020 6.1 4.0 - 11.0 thou/uL Final   01/13/2020 6.2 4.0 - 11.0 thou/uL Final   08/28/2015 10.6 4.0 - 11.0 thou/uL Final   08/27/2015 11.5 (H) 4.0 - 11.0 thou/uL Final     Hemoglobin   Date Value Ref Range Status   08/14/2020 14.7 14.0 - 18.0 g/dL Final   01/13/2020 13.7 (L) 14.0 - 18.0 g/dL Final   04/22/2019 13.2 (L) 14.0 - 18.0 g/dL Final     Platelets   Date Value Ref Range Status   08/14/2020 341 140 - 440 thou/uL Final   01/13/2020 307 140 - 440 thou/uL Final   04/22/2019 415 140 - 440 thou/uL Final       Lab  Results   Component Value Date    RF <15.0 01/13/2020    SEDRATE 10 01/13/2020     Duration of the call:6  Minutes  Call start: 435  pm  Call end:   441pm

## 2021-06-15 PROBLEM — M47.812 OSTEOARTHRITIS CERVICAL SPINE: Status: ACTIVE | Noted: 2017-05-17

## 2021-06-15 PROBLEM — M47.816 OSTEOARTHRITIS OF LUMBAR SPINE: Status: ACTIVE | Noted: 2017-05-17

## 2021-06-16 PROBLEM — M43.28 ANKYLOSIS OF SACROILIAC JOINT: Chronic | Status: ACTIVE | Noted: 2020-02-17

## 2021-06-16 PROBLEM — E66.01 MORBID OBESITY (H): Status: ACTIVE | Noted: 2020-11-30

## 2021-06-16 PROBLEM — Z82.49 FAMILY HISTORY OF PULMONARY EMBOLISM: Status: ACTIVE | Noted: 2019-09-12

## 2021-06-16 PROBLEM — I10 BENIGN ESSENTIAL HYPERTENSION: Status: ACTIVE | Noted: 2020-05-27

## 2021-06-17 ENCOUNTER — OFFICE VISIT - HEALTHEAST (OUTPATIENT)
Dept: RHEUMATOLOGY | Facility: CLINIC | Age: 36
End: 2021-06-17

## 2021-06-17 DIAGNOSIS — M54.2 CERVICALGIA: ICD-10-CM

## 2021-06-17 DIAGNOSIS — M47.812 SPONDYLOSIS OF CERVICAL REGION WITHOUT MYELOPATHY OR RADICULOPATHY: ICD-10-CM

## 2021-06-17 DIAGNOSIS — M43.28 ANKYLOSIS OF SACROILIAC JOINT: ICD-10-CM

## 2021-06-17 DIAGNOSIS — K50.118 CROHN'S DISEASE OF COLON WITH OTHER COMPLICATION (H): ICD-10-CM

## 2021-06-19 NOTE — LETTER
Letter by Hipolito Barahona MD at      Author: Hipolito Barahona MD Service: -- Author Type: --    Filed:  Encounter Date: 9/20/2019 Status: (Other)       Dear Jose Zuniga    Thank you for choosing [facility] for your care.  We are committed to providing you with the highest quality and compassionate healthcare services.  The following information pertains to your first appointment with our clinic.    Date/Time of appointment: Thursday October 10, 1:45 pm      Note: Please arrive 30 minutes prior to your appointment time.  This allows time to complete forms, possible labs and nursing assessment.    Name of your Physician: Dr Hipolito Barahona    What to bring to your appointment:    Completed Patient History/Initial Nursing Assessment and Medication/Allergy List (these forms were sent to you).    Any paperwork or films from your physician that we have asked you to bring.    Your current insurance card(s).    Parking:    Please refer to the map included to direct you to [facility].    You can park in any visitor/patient parking area you choose. There is no charge for parking at [facility].     Enter the hospital at the front/main entrance.      Please check in with our  representatives who will escort you to the clinic located in [address] of the [facility].    We hope these instructions are helpful to you.  If you have any questions or concerns, please call us at [phone].  It is our pleasure to assist you.    Warm Regards,  Adrianna Jorgensen  Nurse Navigator  [phone]

## 2021-06-21 NOTE — PROGRESS NOTES
Assessment:  1.  Crohn's disease with apparent bowel to bowel fistula on recent scan.,  Managed by his GI Dr. Mendoza  2.  Counseling regarding travel to [place].  3.  Abdominal pain, variable.    Plan: Given the blood work done last week and clinical status it does not look like there would be help from doing any blood work today.  Recommend he continue on the steroid taper as outlined in the hospital discharge summary.  Because he will not be getting the prednisone prescription that was done on hospital discharge until about November 7, he will have sufficient prednisone for the trip including possibility of needing to bump that up.  We will give him hepatitis A immunization #1 today and he can get the second 1 in 6 months from now.  Explained to him to avoid any contact with pets or animals to avoid any risk of rabies exposure.  Given their travel plans, there is not any benefit to them trying to get Urdu encephalitis vaccination prior to travel.  Recommend he contact Dr. Mendoza regarding current status of his abdominal pain has Dr. Mendoza would need to decide whether or not to adjust dose of prednisone or leave the taper as it is.  Spent in excess of 25 minutes with at least 50% in counseling here.  Do recommend that he get the sperm obtained and frozen for the in vitro fertilization process as soon as he can given the likely potential of need to get on methotrexate to help protect his options for the immune therapy type treatment for his Crohn's.  He and his wife do plan to do that before they go on their trip on November 12.    Subjective: 33-year-old male presenting for follow-up on the above.  See the hospitalization and discharge summary from 1 week ago.  He has had Crohn's for some time, was hospitalized for the flareup, put on steroid bump and slow taper.  He and his wife are planning to leave on 2 weeks from today November 12, for 3-week trip to [place].  They go through [place] to [place] and  they are not in any other countries besides [place].  They will be seeing a number of her friends in that country.  He is currently on the 40 mg daily of prednisone.  Past Medical History:   Diagnosis Date     Chronic back pain      Chronic neck pain      Eczema     mild, as a child     Shingles      No Known Allergies  Current Outpatient Prescriptions   Medication Sig Dispense Refill     adalimumab (HUMIRA) 40 mg/0.8 mL injection Inject 40 mg under the skin every 14 (fourteen) days.       calcium, as carbonate, (OS-FAUSTINO) 500 mg calcium (1,250 mg) tablet Take 1 tablet (500 mg total) by mouth 2 (two) times a day.  0     multivitamin with minerals (THERA-M) 9 mg iron-400 mcg Tab tablet Take 1 tablet by mouth daily as needed.        predniSONE (DELTASONE) 10 mg tablet pack 4 tabs daily for 2 weeks, 3 tabs daily for 2 weeks, 2 tabs daily for 2 weeks then further prescription per primary. 126 tablet 0     No current facility-administered medications for this visit.      No fever nausea or vomiting.  His diarrhea did resolve last Thursday 4 days ago.  While his abdominal pain is better than it was in the hospital, he notes that in the last 24 hours it did flareup some.  He states that the plan with his gastroenterologist is that if he was to have any significant flare he would present for recheck at emergency room and potential readmission to hospital.  All other review of systems are currently negative.    He and his wife are going to the [facility] for infertility issue.  They have been seen by the [facility] for reproductive [facility] here in the [facility].  He notes that his wife has very good insurance and IVF is covered.  I understand that when he gets the sperm donated, then at some point he would be getting on methotrexate in order to help with the issue of antibodies to the injections.  He states that he may be needing to switch to Stelara.    Objective:/90  Pulse 79  Resp 20  Wt (!) 305 lb (138.3 kg)  SpO2  98%  BMI 38.12 kg/m2  HEENT exam is negative.  Neck supple without adenopathy or thyromegaly.  Lungs clear.  Heart regular rate and rhythm without murmur.  Abdomen shows active bowel sounds, there is mild mid abdominal tenderness but no rebound or guarding.  No pedal edema.  Is alert with clear speech.  No focal neurologic abnormalities.

## 2021-06-26 NOTE — PROGRESS NOTES
Jose Zuniga is a 36 y.o. male who is being evaluated via a billable video visit.      How would you like to obtain your AVS? MyChart.  If dropped from the video visit, the video invitation should be resent by: Text to cell phone: [phone]  Will anyone else be joining your video visit? No         Video-Visit Details    Type of service:  Video Visit  Start: 06/17/2021 07:32 am   Stop: 06/17/2021 07:42 am  Originating Location (pt. Location): Home    [facility]     Platform used for Video Visit: Team[employer]      This document was created using a software with less than 100% fidelity, at times resulting in unintended, even erroneous syntax and grammar.  The reader is advised to keep this under consideration while reviewing, interpreting this note.           ASSESSMENT AND PLAN:    Diagnoses and all orders for this visit:    Ankylosis of sacroiliac joint    Spondylosis of cervical region without myelopathy or radiculopathy  -     DULoxetine (CYMBALTA) 30 MG capsule; Take 1 capsule (30 mg total) by mouth 2 (two) times a day.  Dispense: 180 capsule; Refill: 1    Neck Pain    Crohn's disease of colon with other complication (H)        Follow up in 1 year      HISTORY OF PRESENTING ILLNESS:  Jose Zuniga 36 y.o. is evaluated here via video/audio link.  This is for follow-up.  He has ankylosing spondylitis, sacroiliac, in the background of Crohn's for which he is on Stelara and methotrexate from his gastroenterologist.  Prior to this he had been on Remicade and Humira.  He is more symptomatic in his neck area.  MRI from 2020 reviewed today with him shows DJD changes.  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  He is on duloxetine for this.  He has found it of help.  It has not eliminated the pain.  He is going to try Tylenol sustained-release 2 tablets every 8 hours for the next 3 weeks and then reassess it  himself.  He has had corticosteroid injections several years ago in the spine clinic as well as physical therapy.  He has not had any significant symptoms in the appendicular joints.  He gets methotrexate from his gastroenterologist and has had regular labs.  He had ongoing observation because of her study he had his antibodies checked the other day and was relieved to find out that he had a good immune response.  As well as he is doing we will meet here in 12 months or sooner if needed.       ROS enquiry held for fever, ocular symptoms, rash, headache,  GI issues.  Today we also discussed the issues related to the current pandemic, the pros and cons of the current treatment plan, the CDC guidelines such as social distancing washing the hands covering the cough.  ALLERGIES:Patient has no known allergies.    PAST MEDICAL/ACTIVE PROBLEMS/MEDICATION/SOCIAL DATA  Past Medical History:   Diagnosis Date     Chronic back pain      Chronic neck pain      Crohn's disease (H)      Eczema     mild, as a child     Hypertension      Shingles      Social History     Tobacco Use   Smoking Status Never Smoker   Smokeless Tobacco Former User     Types: Chew   Tobacco Comment    No exposure     Patient Active Problem List   Diagnosis     Allergic Rhinitis     Localized Primary Osteoarthritis Of The Right Shoulder Glenohumeral Joint     Lower Back Pain     Neck Pain     Cervical Radiculopathy     Lumbar radiculopathy     Crohn's colitis (H)     Anemia     Osteoarthritis of lumbar spine     Osteoarthritis cervical spine     Family history of pulmonary embolism     Ankylosis of sacroiliac joint     Benign essential hypertension     Morbid obesity (H)     Current Outpatient Medications   Medication Sig Dispense Refill     amLODIPine (NORVASC) 5 MG tablet TAKE 1 TABLET (5MG TOTAL) BY MOUTH ONCE DAILY 30 tablet 3     calcium, as carbonate, (OS-FAUSTINO) 500 mg calcium (1,250 mg) tablet Take 1 tablet (500 mg total) by mouth 2 (two) times a day.   0     folic acid (FOLVITE) 1 MG tablet Take 1,000 mcg by mouth daily.  11     losartan (COZAAR) 100 MG tablet Take 1 tablet (100 mg total) by mouth daily. 90 tablet 1     methotrexate 2.5 MG tablet Take 2.5 tablets by mouth once a week. Take 5 tabs once a week       multivitamin with minerals (THERA-M) 9 mg iron-400 mcg Tab tablet Take 1 tablet by mouth daily as needed.        STELARA 90 mg/mL Syrg        DULoxetine (CYMBALTA) 30 MG capsule Take 1 capsule (30 mg total) by mouth 2 (two) times a day. 180 capsule 1     No current facility-administered medications for this visit.          EXAMINATION:    Using the audio and video link as best as possible the constitutional, neck, neurologic, psych, skin, both upper extremities areas/organ system were evaluated during this assessment.  Some of the important findings: Alert, oriented, speech fluent.    Able to fully flex the digits, into fists bilaterally, wrist and elbow range of motion appear normal, abduction of the shoulder is normal.      LAB / IMAGING DATA:  ALT   Date Value Ref Range Status   08/14/2020 38 0 - 45 U/L Final   01/13/2020 49 (H) 0 - 45 U/L Final   04/22/2019 13 0 - 45 U/L Final     Albumin   Date Value Ref Range Status   08/14/2020 3.7 3.5 - 5.0 g/dL Final   01/13/2020 3.5 3.5 - 5.0 g/dL Final   04/22/2019 3.1 (L) 3.5 - 5.0 g/dL Final     Creatinine   Date Value Ref Range Status   08/14/2020 1.02 0.70 - 1.30 mg/dL Final   05/27/2020 1.01 0.70 - 1.30 mg/dL Final   01/27/2020 0.65 (L) 0.70 - 1.30 mg/dL Final       WBC   Date Value Ref Range Status   08/14/2020 6.1 4.0 - 11.0 thou/uL Final   01/13/2020 6.2 4.0 - 11.0 thou/uL Final   08/28/2015 10.6 4.0 - 11.0 thou/uL Final   08/27/2015 11.5 (H) 4.0 - 11.0 thou/uL Final     Hemoglobin   Date Value Ref Range Status   08/14/2020 14.7 14.0 - 18.0 g/dL Final   01/13/2020 13.7 (L) 14.0 - 18.0 g/dL Final   04/22/2019 13.2 (L) 14.0 - 18.0 g/dL Final     Platelets   Date Value Ref Range Status   08/14/2020 341  140 - 440 thou/uL Final   01/13/2020 307 140 - 440 thou/uL Final   04/22/2019 415 140 - 440 thou/uL Final       Lab Results   Component Value Date    RF <15.0 01/13/2020    SEDRATE 10 01/13/2020

## 2021-09-05 ENCOUNTER — HEALTH MAINTENANCE LETTER (OUTPATIENT)
Age: 36
End: 2021-09-05

## 2021-11-08 ENCOUNTER — TRANSFERRED RECORDS (OUTPATIENT)
Dept: HEALTH INFORMATION MANAGEMENT | Facility: CLINIC | Age: 36
End: 2021-11-08
Payer: COMMERCIAL

## 2021-11-21 ENCOUNTER — ANCILLARY PROCEDURE (OUTPATIENT)
Dept: GENERAL RADIOLOGY | Facility: CLINIC | Age: 36
End: 2021-11-21
Attending: FAMILY MEDICINE
Payer: COMMERCIAL

## 2021-11-21 ENCOUNTER — OFFICE VISIT (OUTPATIENT)
Dept: URGENT CARE | Facility: URGENT CARE | Age: 36
End: 2021-11-21
Payer: COMMERCIAL

## 2021-11-21 VITALS
SYSTOLIC BLOOD PRESSURE: 130 MMHG | OXYGEN SATURATION: 97 % | WEIGHT: 315 LBS | HEART RATE: 90 BPM | HEIGHT: 74 IN | BODY MASS INDEX: 40.43 KG/M2 | DIASTOLIC BLOOD PRESSURE: 84 MMHG | RESPIRATION RATE: 16 BRPM | TEMPERATURE: 98.1 F

## 2021-11-21 DIAGNOSIS — R05.8 PRODUCTIVE COUGH: Primary | ICD-10-CM

## 2021-11-21 DIAGNOSIS — R09.81 CONGESTION OF PARANASAL SINUS: ICD-10-CM

## 2021-11-21 DIAGNOSIS — J18.9 PNEUMONIA OF RIGHT LOWER LOBE DUE TO INFECTIOUS ORGANISM: ICD-10-CM

## 2021-11-21 DIAGNOSIS — R50.9 FEVER IN ADULT: ICD-10-CM

## 2021-11-21 PROCEDURE — 99214 OFFICE O/P EST MOD 30 MIN: CPT | Performed by: FAMILY MEDICINE

## 2021-11-21 PROCEDURE — 71046 X-RAY EXAM CHEST 2 VIEWS: CPT | Performed by: RADIOLOGY

## 2021-11-21 RX ORDER — BENZONATATE 100 MG/1
100 CAPSULE ORAL 2 TIMES DAILY PRN
Qty: 20 CAPSULE | Refills: 0 | Status: SHIPPED | OUTPATIENT
Start: 2021-11-21 | End: 2021-12-14

## 2021-11-21 RX ORDER — DOXYCYCLINE 100 MG/1
100 CAPSULE ORAL 2 TIMES DAILY
Qty: 14 CAPSULE | Refills: 0 | Status: SHIPPED | OUTPATIENT
Start: 2021-11-21 | End: 2021-11-21

## 2021-11-21 RX ORDER — AZITHROMYCIN 250 MG/1
TABLET, FILM COATED ORAL
Qty: 6 TABLET | Refills: 0 | Status: SHIPPED | OUTPATIENT
Start: 2021-11-21 | End: 2021-11-26

## 2021-11-21 ASSESSMENT — MIFFLIN-ST. JEOR: SCORE: 2577.34

## 2021-11-21 NOTE — PROGRESS NOTES
Chief Complaint   Patient presents with     Sick     preductive cough, congestion,  ear pain, fever -- -- requestion chest xray  - NEG coid test yesterday-- took Tylenol and robitussin     Initial differential diagnosis included but was not restricted to   Differential Diagnosis:  URI Adult/Peds:  Bronchitis-viral, Influenza, Pneumonia, Sinusitis, Strep pharyngitis, Tonsilitis, Viral pharyngitis, Viral syndrome and Viral upper respiratory illness  Medical Decision Making:    ASSESMENT AND PLAN     Jose was seen today for sick.    Diagnoses and all orders for this visit:    Productive cough  -     XR Chest 2 Views  -     benzonatate (TESSALON) 100 MG capsule; Take 1 capsule (100 mg) by mouth 2 times daily as needed for cough  -     azithromycin (ZITHROMAX) 250 MG tablet; Take 2 tablets (500 mg) by mouth daily for 1 day, THEN 1 tablet (250 mg) daily for 4 days.    Fever in adult  -     XR Chest 2 Views    Congestion of paranasal sinus  -     azithromycin (ZITHROMAX) 250 MG tablet; Take 2 tablets (500 mg) by mouth daily for 1 day, THEN 1 tablet (250 mg) daily for 4 days.    Pneumonia of right lower lobe due to infectious organism  -     Discontinue: doxycycline hyclate (VIBRAMYCIN) 100 MG capsule; Take 1 capsule (100 mg) by mouth 2 times daily for 7 days  -     azithromycin (ZITHROMAX) 250 MG tablet; Take 2 tablets (500 mg) by mouth daily for 1 day, THEN 1 tablet (250 mg) daily for 4 days.      Discussed with patient with x-ray findings  Reviewed with patient about considering antibiotic for the lung finding and also for beginning of possible bacterial sinusitis.  Advised patient to continue symptomatic treatment with nasal decongestant and antihistamine. Dae Squires was called in to help with the coughing symptoms.  Discussed the symptoms with better over the next 48 hours should follow-up.  Tylenol, Fluids, Rest, Saline gargles and Vaporizer  Routine discharge counseling was given to the patient and the patient  understands that worsening, changing or persistent symptoms should prompt an immediate call or follow up with their primary physician or the emergency department. The importance of appropriate follow up was also discussed with the patient.     I have reviewed the nursing notes.  Review of the result(s) of each unique test   X-Ray was done, my findings are: Right lower lung opacity noted    Time  spent on the date of the encounter doing chart review, review of test results, interpretation of tests, patient visit and documentation   see orders in Epic  Pt verbalized and agreed with the plan and is aware of the worsening symptoms for which would need to follow up .  Pt was stable during time of discharge from the clinic     SUBJECTIVE     Jose Zuniga is a 36 year old male presenting with a chief complaint of    Chief Complaint   Patient presents with     Sick     preductive cough, congestion,  ear pain, fever -- -- requestion chest xray  - NEG coid test yesterday-- took Tylenol and robitussin           Jose Zuniga is a 36 year old male presenting with a chief complaint of cough - non-productive, cough - productive and ear pain right, fever yesterday . He is an established patient of [facility].cough started as dry changed to productive over last 1 days, also noticing sinus drainage   Has no change in taste or smell Onset of symptoms was 4 day(s) ago.  Course of illness is worsening.    Severity moderate  Current and Associated symptoms: stuffy nose, facial pain/pressure and headache  Treatment measures tried include Tylenol/Ibuprofen.  Predisposing factors include recent illness .    Past Medical History:   Diagnosis Date     Chronic back pain      Chronic neck pain      Crohn's disease (H)      Eczema     mild, as a child     Hypertension      NO ACTIVE PROBLEMS      Regional enteritis (Crohn's disease) (H)      Shingles      Current Outpatient Medications   Medication Sig Dispense Refill     azithromycin  "(ZITHROMAX) 250 MG tablet Take 2 tablets (500 mg) by mouth daily for 1 day, THEN 1 tablet (250 mg) daily for 4 days. 6 tablet 0     benzonatate (TESSALON) 100 MG capsule Take 1 capsule (100 mg) by mouth 2 times daily as needed for cough 20 capsule 0     calcium carbonate 500 mg, elemental, (OSCAL 500) 1250 (500 Ca) MG TABS tablet Take 500 mg by mouth (Patient not taking: Reported on 11/21/2021)       STELARA 90 MG/ML  (Patient not taking: Reported on 11/21/2021)       Social History     Tobacco Use     Smoking status: Never Smoker     Smokeless tobacco: Former User     Types: Chew     Tobacco comment: No exposure   Substance Use Topics     Alcohol use: Yes     Alcohol/week: 2.0 standard drinks     Comment: has a drink once a month     Family History   Problem Relation Age of Onset     Hypertension Mother      Lipids Mother      Respiratory Father      Heart Disease Maternal Grandmother      Cancer Maternal Grandmother      Heart Disease Paternal Grandmother      Heart Disease Paternal Grandfather      Colon Cancer Mother      Pulmonary Embolism Mother      Heart Disease Father      Kidney Disease Father      Pulmonary Embolism Father      Factor V Leiden deficiency Brother          ROS:    10 point ROS of systems including Constitutional, Eyes, Cardiovascular, Gastroenterology, Genitourinary, Integumentary, Muscularskeletal, Psychiatric ,neurological were all negative except for pertinent positives noted in my HPI         OBJECTIVE:    /84 (BP Location: Right arm, Patient Position: Chair, Cuff Size: Adult Large)   Pulse 90   Temp 98.1  F (36.7  C) (Oral)   Resp 16   Ht 1.88 m (6' 2\")   Wt (!) 158.8 kg (350 lb)   SpO2 97%   BMI 44.94 kg/m    GENERAL APPEARANCE: healthy, alert and no distress  EYES: EOMI,  PERRL, conjunctiva clear  HENT: ear canals and TM's normal.  Nose and mouth without ulcers, erythema or lesions  NECK: supple, nontender, no lymphadenopathy  RESP: lungs good air entry positive for " diminished breath sounds on the left  no rales, rhonchi or wheezes  CV: regular rates and rhythm, normal S1 S2, no murmur noted  ABDOMEN:  soft, nontender, no HSM or masses and bowel sounds normal  SKIN: no suspicious lesions or rashes  PSYCH: mentation appears normal  Physical Exam      (Note was completed, in part, with Cortex[employer] voice-recognition software. Documentation reviewed, but some grammatical, spelling, and word errors may remain.)  Sandra Fortune MD on 11/21/2021 at 12:37 PM

## 2021-12-14 ENCOUNTER — OFFICE VISIT (OUTPATIENT)
Dept: FAMILY MEDICINE | Facility: CLINIC | Age: 36
End: 2021-12-14
Payer: COMMERCIAL

## 2021-12-14 VITALS
OXYGEN SATURATION: 98 % | WEIGHT: 315 LBS | SYSTOLIC BLOOD PRESSURE: 150 MMHG | RESPIRATION RATE: 14 BRPM | DIASTOLIC BLOOD PRESSURE: 98 MMHG | TEMPERATURE: 98.7 F | HEART RATE: 96 BPM | HEIGHT: 74 IN | BODY MASS INDEX: 40.43 KG/M2

## 2021-12-14 DIAGNOSIS — Z13.220 SCREENING FOR HYPERLIPIDEMIA: ICD-10-CM

## 2021-12-14 DIAGNOSIS — I10 BENIGN ESSENTIAL HYPERTENSION: ICD-10-CM

## 2021-12-14 DIAGNOSIS — Z00.00 ENCOUNTER FOR PREVENTATIVE ADULT HEALTH CARE EXAMINATION: Primary | ICD-10-CM

## 2021-12-14 DIAGNOSIS — Z13.1 SCREENING FOR DIABETES MELLITUS: ICD-10-CM

## 2021-12-14 PROBLEM — M54.12 CERVICAL RADICULOPATHY: Status: ACTIVE | Noted: 2021-12-14

## 2021-12-14 PROCEDURE — 99395 PREV VISIT EST AGE 18-39: CPT | Performed by: FAMILY MEDICINE

## 2021-12-14 RX ORDER — USTEKINUMAB 90 MG/ML
90 INJECTION, SOLUTION SUBCUTANEOUS
COMMUNITY
Start: 2021-11-08

## 2021-12-14 RX ORDER — LOSARTAN POTASSIUM 100 MG/1
100 TABLET ORAL
COMMUNITY
Start: 2020-05-27 | End: 2021-12-26

## 2021-12-14 RX ORDER — METHOTREXATE 2.5 MG/1
TABLET ORAL
COMMUNITY
Start: 2021-11-08 | End: 2021-12-14

## 2021-12-14 RX ORDER — PREDNISONE 20 MG/1
40 TABLET ORAL
COMMUNITY
End: 2022-08-24

## 2021-12-14 RX ORDER — LOSARTAN POTASSIUM 50 MG/1
50 TABLET ORAL DAILY
Qty: 90 TABLET | Refills: 1 | Status: SHIPPED | OUTPATIENT
Start: 2021-12-14 | End: 2021-12-26

## 2021-12-14 RX ORDER — MULTIVITAMIN
1 TABLET ORAL DAILY
COMMUNITY
Start: 2021-12-14 | End: 2024-01-10

## 2021-12-14 RX ORDER — AMLODIPINE BESYLATE 5 MG/1
TABLET ORAL
COMMUNITY
Start: 2020-07-21 | End: 2021-12-14

## 2021-12-14 ASSESSMENT — ENCOUNTER SYMPTOMS
HEMATURIA: 0
PARESTHESIAS: 0
SORE THROAT: 0
COUGH: 1
HEARTBURN: 0
NAUSEA: 0
PALPITATIONS: 0
ARTHRALGIAS: 1
CONSTIPATION: 0
HEMATOCHEZIA: 1
DIZZINESS: 0
DYSURIA: 0
FREQUENCY: 0
FEVER: 0
DIARRHEA: 1
EYE PAIN: 0
MYALGIAS: 0
SHORTNESS OF BREATH: 0
CHILLS: 0
HEADACHES: 0
ABDOMINAL PAIN: 1
JOINT SWELLING: 0
NERVOUS/ANXIOUS: 0

## 2021-12-14 ASSESSMENT — MIFFLIN-ST. JEOR: SCORE: 2441.26

## 2021-12-14 ASSESSMENT — PAIN SCALES - GENERAL: PAINLEVEL: NO PAIN (0)

## 2021-12-14 NOTE — PROGRESS NOTES
SUBJECTIVE:   CC: Jose Zuniga is an 36 year old male who presents for preventative health visit.     Patient has been advised of split billing requirements and indicates understanding: Yes       Healthy Habits:     Getting at least 3 servings of Calcium per day:  Yes    Bi-annual eye exam:  Yes    Dental care twice a year:  Yes    Sleep apnea or symptoms of sleep apnea:  None    Diet:  Carbohydrate counting    Frequency of exercise:  2-3 days/week    Duration of exercise:  15-30 minutes    Taking medications regularly:  Yes    Medication side effects:  Not applicable    PHQ-2 Total Score: 0    Additional concerns today:  Yes      Today's PHQ-2 Score:   PHQ-2 ( 1999 [employer]) 12/14/2021   Q1: Little interest or pleasure in doing things 0   Q2: Feeling down, depressed or hopeless 0   PHQ-2 Score 0   Q1: Little interest or pleasure in doing things Not at all   Q2: Feeling down, depressed or hopeless Not at all   PHQ-2 Score 0       Abuse: Current or Past(Physical, Sexual or Emotional)- NO  Do you feel safe in your environment? YES    Have you ever done Advance Care Planning? (For example, a Health Directive, POLST, or a discussion with a medical provider or your loved ones about your wishes): No, advance care planning information given to patient to review.  Patient plans to discuss their wishes with loved ones or provider.      Social History     Tobacco Use     Smoking status: Never Smoker     Smokeless tobacco: Former User     Types: Chew     Tobacco comment: No exposure   Substance Use Topics     Alcohol use: Yes     Alcohol/week: 2.0 standard drinks     Comment: 1 drink per week     If you drink alcohol do you typically have >3 drinks per day or >7 drinks per week? No    Alcohol Use 12/14/2021   Prescreen: >3 drinks/day or >7 drinks/week? No   Prescreen: >3 drinks/day or >7 drinks/week? -       Last PSA: No results found for: PSA    Reviewed orders with patient. Reviewed health maintenance and updated orders  accordingly - Yes  Labs reviewed in EPIC    Reviewed and updated as needed this visit by clinical staff  Tobacco  Allergies  Meds   Med Hx  Surg Hx  Fam Hx  Soc Hx       Reviewed and updated as needed this visit by Provider       Surg Hx  Fam Hx        Past Medical History:   Diagnosis Date     Chronic back pain      Chronic neck pain      Crohn's disease (H)      Eczema     mild, as a child     Hypertension      NO ACTIVE PROBLEMS      Regional enteritis (Crohn's disease) (H)      Shingles       Past Surgical History:   Procedure Laterality Date     APPENDECTOMY  2005     C APPENDECTOMY      Description: Appendectomy;  Proc Date: 01/01/2005;     C SHLDR ARTHROSCOP,DIAGNOSTIC Left x2    Description: Arthroscopy Shoulder Left;  Proc Date: 01/01/2000;  Comments: labral repair by Dr Hector     COLON SURGERY      bowel resection may 2019     COLONOSCOPY N/A 8/28/2015    Procedure: COLONOSCOPY with biopsies using forceps;  Surgeon: Hina Madison MD;  Location: [facility];  Service:      HC REMOVAL OF TONSILS,<13 Y/O      Description: Tonsillectomy;  Recorded: 09/04/2013;  Comments: childhood     ORTHOPEDIC SURGERY  2000    left shoulder surgery     OTHER SURGICAL HISTORY  [facility]     SHOULDER SURGERY Right        Review of Systems   Constitutional: Negative for chills and fever.   HENT: Negative for congestion, ear pain, hearing loss and sore throat.    Eyes: Negative for pain and visual disturbance.   Respiratory: Positive for cough. Negative for shortness of breath.    Cardiovascular: Negative for chest pain, palpitations and peripheral edema.   Gastrointestinal: Positive for abdominal pain, diarrhea and hematochezia. Negative for constipation, heartburn and nausea.   Genitourinary: Negative for dysuria, frequency, genital sores, hematuria, impotence, penile discharge and urgency.   Musculoskeletal: Positive for  "arthralgias. Negative for joint swelling and myalgias.   Skin: Negative for rash.   Neurological: Negative for dizziness, headaches and paresthesias.   Psychiatric/Behavioral: Negative for mood changes. The patient is not nervous/anxious.      OBJECTIVE:   BP (!) 150/98   Pulse 96   Temp 98.7  F (37.1  C) (Tympanic)   Resp 14   Ht 1.88 m (6' 2\")   Wt 145.2 kg (320 lb)   SpO2 98%   BMI 41.09 kg/m      Physical Exam  GENERAL: healthy, alert and no distress  EYES: Eyes grossly normal to inspection, PERRL and conjunctivae and sclerae normal  HENT: ear canals and TM's normal, nose and mouth without ulcers or lesions  NECK: no adenopathy and no asymmetry, masses, or scars  RESP: lungs clear to auscultation - no rales, rhonchi or wheezes  CV: regular rates and rhythm, normal S1 S2, no S3 or S4, no murmur, click or rub  MS: no gross musculoskeletal defects noted, no edema  SKIN: no suspicious lesions or rashes    Diagnostic Test Results:  Labs reviewed in Epic    ASSESSMENT/PLAN:   1. Encounter for preventative adult health care examination: health care maintenance reviewed and updated.     2. Screening for hyperlipidemia  - Lipid panel reflex to direct LDL Fasting; Future    3. Benign essential hypertension: BP still elevated on recheck. Will restart losartan 50 mg daily and follow up in 2 weeks to recheck BP.  - Comprehensive metabolic panel (BMP + Alb, Alk Phos, ALT, AST, Total. Bili, TP); Future  - losartan (COZAAR) 50 MG tablet; Take 1 tablet (50 mg) by mouth daily  Dispense: 90 tablet; Refill: 1    4. Screening for diabetes mellitus  - Comprehensive metabolic panel (BMP + Alb, Alk Phos, ALT, AST, Total. Bili, TP); Future      Patient has been advised of split billing requirements and indicates understanding: Yes  COUNSELING:   Reviewed preventive health counseling, as reflected in patient instructions       Regular exercise       Healthy diet/nutrition    Estimated body mass index is 41.09 kg/m  as calculated " "from the following:    Height as of this encounter: 1.88 m (6' 2\").    Weight as of this encounter: 145.2 kg (320 lb).     Weight management plan: Discussed healthy diet and exercise guidelines    He reports that he has never smoked. He quit smokeless tobacco use about 2 years ago.  His smokeless tobacco use included chew.      Counseling Resources:  ATP IV Guidelines  Pooled Cohorts Equation Calculator  FRAX Risk Assessment  ICSI Preventive Guidelines  Dietary Guidelines for Americans, 2010  USDA's MyPlate  ASA Prophylaxis  Lung CA Screening    Bernabe Izaguirre [facility]  "

## 2021-12-15 ENCOUNTER — TELEPHONE (OUTPATIENT)
Dept: FAMILY MEDICINE | Facility: CLINIC | Age: 36
End: 2021-12-15
Payer: COMMERCIAL

## 2021-12-15 NOTE — TELEPHONE ENCOUNTER
Left non detailed message to clarify if patient had lab work done the day of his wellness exam ( 12/14/21) and if not please help schedule due to waiting for completion of labs in order to complete form from [employer] (Wellness screening form).     Form is at the [facility] desk waiting for completion and to be faxed.     Emmanuel Edwards

## 2021-12-15 NOTE — TELEPHONE ENCOUNTER
Patient returning call.  Informed him of message below.    He states he did not have labs done yet but is planning on going to either [facility] or [facility] to have labs done 12-16-21.

## 2021-12-15 NOTE — TELEPHONE ENCOUNTER
Reason for Call:  Form, our goal is to have forms completed with 72 hours, however, some forms may require a visit or additional information.    Type of letter, form or note:  medical    Who is the form from?: [employer] (if other please explain)    Where did the form come from: form was faxed in    What clinic location was the form placed at?: [facility]    Where the form was placed: Given to physician    What number is listed as a contact on the form?: Fax #[phone]       Additional comments:     Call taken on 12/15/2021 at 11:08 AM by Emmanuel Edwards

## 2021-12-16 ENCOUNTER — LAB (OUTPATIENT)
Dept: LAB | Facility: CLINIC | Age: 36
End: 2021-12-16
Payer: COMMERCIAL

## 2021-12-16 DIAGNOSIS — Z13.220 SCREENING FOR HYPERLIPIDEMIA: ICD-10-CM

## 2021-12-16 DIAGNOSIS — Z13.1 SCREENING FOR DIABETES MELLITUS: ICD-10-CM

## 2021-12-16 DIAGNOSIS — I10 BENIGN ESSENTIAL HYPERTENSION: ICD-10-CM

## 2021-12-16 LAB
ALBUMIN SERPL-MCNC: 3.4 G/DL (ref 3.4–5)
ALP SERPL-CCNC: 130 U/L (ref 40–150)
ALT SERPL W P-5'-P-CCNC: 40 U/L (ref 0–70)
ANION GAP SERPL CALCULATED.3IONS-SCNC: 4 MMOL/L (ref 3–14)
AST SERPL W P-5'-P-CCNC: 19 U/L (ref 0–45)
BILIRUB SERPL-MCNC: 0.7 MG/DL (ref 0.2–1.3)
BUN SERPL-MCNC: 10 MG/DL (ref 7–30)
CALCIUM SERPL-MCNC: 8.7 MG/DL (ref 8.5–10.1)
CHLORIDE BLD-SCNC: 109 MMOL/L (ref 94–109)
CHOLEST SERPL-MCNC: 115 MG/DL
CO2 SERPL-SCNC: 28 MMOL/L (ref 20–32)
CREAT SERPL-MCNC: 0.93 MG/DL (ref 0.66–1.25)
FASTING STATUS PATIENT QL REPORTED: YES
GFR SERPL CREATININE-BSD FRML MDRD: >90 ML/MIN/1.73M2
GLUCOSE BLD-MCNC: 92 MG/DL (ref 70–99)
HDLC SERPL-MCNC: 39 MG/DL
LDLC SERPL CALC-MCNC: 45 MG/DL
NONHDLC SERPL-MCNC: 76 MG/DL
POTASSIUM BLD-SCNC: 3.7 MMOL/L (ref 3.4–5.3)
PROT SERPL-MCNC: 7.3 G/DL (ref 6.8–8.8)
SODIUM SERPL-SCNC: 141 MMOL/L (ref 133–144)
TRIGL SERPL-MCNC: 154 MG/DL

## 2021-12-16 PROCEDURE — 80061 LIPID PANEL: CPT

## 2021-12-16 PROCEDURE — 36415 COLL VENOUS BLD VENIPUNCTURE: CPT

## 2021-12-16 PROCEDURE — 82040 ASSAY OF SERUM ALBUMIN: CPT

## 2021-12-20 NOTE — TELEPHONE ENCOUNTER
Form faxed to [employer] @ # [phone].     Original sent to be scanned.     Emmanuel Edwards

## 2021-12-22 ENCOUNTER — ALLIED HEALTH/NURSE VISIT (OUTPATIENT)
Dept: NURSING | Facility: CLINIC | Age: 36
End: 2021-12-22
Payer: COMMERCIAL

## 2021-12-22 VITALS
OXYGEN SATURATION: 98 % | DIASTOLIC BLOOD PRESSURE: 92 MMHG | BODY MASS INDEX: 41.98 KG/M2 | HEART RATE: 94 BPM | SYSTOLIC BLOOD PRESSURE: 144 MMHG | WEIGHT: 315 LBS

## 2021-12-22 DIAGNOSIS — I10 BENIGN ESSENTIAL HYPERTENSION: ICD-10-CM

## 2021-12-22 DIAGNOSIS — I10 ESSENTIAL HYPERTENSION: Primary | ICD-10-CM

## 2021-12-22 PROCEDURE — 99207 PR NO CHARGE NURSE ONLY: CPT

## 2021-12-22 NOTE — Clinical Note
Please call patient and have him increase losartan dose to 100 mg daily. He can take two 50 mg tablets until he runs out and a new Rx for 100 mg tablets has been sent to his pharmacy.    Bernabe Izaguirre DO  12/26/2021 11:46 PM

## 2021-12-22 NOTE — PROGRESS NOTES
Hypertension Follow-up      Do you check your blood pressure regularly outside of the clinic? Yes     Are you following a low salt diet? Yes    Are your blood pressures ever more than 140 on the top number (systolic) OR more   than 90 on the bottom number (diastolic), for example 140/90? No      How many servings of fruits and vegetables do you eat daily?  2-3    On average, how many sweetened beverages do you drink each day (Examples: soda, juice, sweet tea, etc.  Do NOT count diet or artificially sweetened beverages)?   1    How many days per week do you exercise enough to make your heart beat faster? 3 or less    How many minutes a day do you exercise enough to make your heart beat faster? 30 - 60    How many days per week do you miss taking your medication? 0       Current Outpatient Medications   Medication     calcium carbonate 500 mg, elemental, (OSCAL 500) 1250 (500 Ca) MG TABS tablet     losartan (COZAAR) 100 MG tablet     losartan (COZAAR) 50 MG tablet     methotrexate 2.5 MG tablet     multivitamin (ONE-DAILY) tablet     predniSONE (DELTASONE) 20 MG tablet     ustekinumab (STELARA) 90 MG/ML     No current facility-administered medications for this visit.       BP Readings from Last 3 Encounters:   12/22/21 (!) 144/92   12/14/21 (!) 150/98   11/21/21 130/84     Creatinine   Date Value Ref Range Status   12/16/2021 0.93 0.66 - 1.25 mg/dL Final     Denies: CP, SOB, headaches, blurred vision, N/V, numbness or tingling.       Reviewed diet and exercise     Best # [phone] [facility]     Pharmacy - alma Mcknight RN, BSN  [facility] - [facility] [place]

## 2021-12-26 RX ORDER — LOSARTAN POTASSIUM 100 MG/1
100 TABLET ORAL DAILY
Qty: 90 TABLET | Refills: 1 | Status: SHIPPED | OUTPATIENT
Start: 2021-12-26 | End: 2024-01-10

## 2021-12-27 NOTE — PROGRESS NOTES
Blood pressure still elevated on recheck. Let's increase losartan dose to 100 mg daily.    Bernabe Izaguirre DO  12/26/2021 11:45 PM

## 2021-12-31 NOTE — PROGRESS NOTES
Patient calls back.     Advised of increase in losartan. Patient started new dose yesterday, 12/30. Patient asks about follow-up. Advised that we should recheck his BP in a week or 2 to make sure new dose is effective. Patient states he will schedule through [employer] or call to set up later.     DINA EMERY RN on 12/31/2021 at 3:50 PM

## 2021-12-31 NOTE — PROGRESS NOTES
Attempt # 1  Called Phone #[phone]    Left a non detailed voicemail to please call back and ask for any available triage nurse @ [phone].     Siri TAMEZ RN   [facility] Triage      no

## 2022-01-04 ENCOUNTER — MYC MEDICAL ADVICE (OUTPATIENT)
Dept: FAMILY MEDICINE | Facility: CLINIC | Age: 37
End: 2022-01-04
Payer: COMMERCIAL

## 2022-03-01 ENCOUNTER — TRANSFERRED RECORDS (OUTPATIENT)
Dept: HEALTH INFORMATION MANAGEMENT | Facility: CLINIC | Age: 37
End: 2022-03-01
Payer: COMMERCIAL

## 2022-06-20 ENCOUNTER — TRANSFERRED RECORDS (OUTPATIENT)
Dept: HEALTH INFORMATION MANAGEMENT | Facility: CLINIC | Age: 37
End: 2022-06-20

## 2022-06-20 LAB
ALT SERPL-CCNC: 38 IU/L (ref 0–44)
AST SERPL-CCNC: 27 IU/L (ref 0–40)
CREATININE (EXTERNAL): 1.01 MG/DL (ref 0.76–1.27)
GFR ESTIMATED (EXTERNAL): 97 ML/MIN/1.73
GLUCOSE (EXTERNAL): 86 MG/DL (ref 65–99)
POTASSIUM (EXTERNAL): 4.1 MMOL/L (ref 3.5–5.2)

## 2022-08-24 ENCOUNTER — VIRTUAL VISIT (OUTPATIENT)
Dept: FAMILY MEDICINE | Facility: OTHER | Age: 37
End: 2022-08-24
Payer: COMMERCIAL

## 2022-08-24 DIAGNOSIS — U07.1 INFECTION DUE TO 2019 NOVEL CORONAVIRUS: Primary | ICD-10-CM

## 2022-08-24 PROBLEM — J45.909 ASTHMA: Status: ACTIVE | Noted: 2022-08-24

## 2022-08-24 PROCEDURE — 99213 OFFICE O/P EST LOW 20 MIN: CPT | Mod: CS | Performed by: PHYSICIAN ASSISTANT

## 2022-08-24 RX ORDER — BUDESONIDE 3 MG/1
CAPSULE, COATED PELLETS ORAL
COMMUNITY
Start: 2022-07-25 | End: 2024-01-10

## 2022-08-24 NOTE — PATIENT INSTRUCTIONS
"Discharge Instructions for COVID-19 Patients  You have--or may have--COVID-19. Please follow the instructions listed below.   If you have a weakened immune system, discuss with your doctor any other actions you need to take.  How can I protect others?  If you have symptoms (fever, cough, body aches or trouble breathing):  Stay home and away from others (self-isolate) until:  Your other symptoms have resolved (gotten better). And   You've had no fever--and no medicine that reduces fever--for 1 full day (24 hours). And   At least 10 days have passed since your symptoms started. (You may need to wait 20 days. Follow the advice of your care team.)  If you don't show symptoms, but testing showed that you have COVID-19:  Stay home and away from others (self-isolate) until at least 10 days have passed since the date of your first positive COVID-19 test.  During this time  Stay in your own room, even for meals. Use your own bathroom if you can.  Stay away from others in your home. No hugging, kissing or shaking hands. No visitors.  Don't go to work, school or anywhere else.  Clean \"high touch\" surfaces often (doorknobs, counters, handles). Use household cleaning spray or wipes.  You'll find a full list of  on the EPA website: www.epa.gov/pesticide-registration/list-n-disinfectants-use-against-sars-cov-2.  Cover your mouth and nose with a mask or other face covering to avoid spreading germs.  Wash your hands and face often. Use soap and water.  Caregivers in these groups are at risk for severe illness due to COVID-19:  People 65 years and older  People who live in a nursing home or long-term care facility  People with chronic disease (lung, heart, cancer, diabetes, kidney, liver, immunologic)  People who have a weakened immune system, including those who:  Are in cancer treatment  Take medicine that weakens the immune system, such as corticosteroids  Had a bone marrow or organ transplant  Have an immune " deficiency  Have poorly controlled HIV or AIDS  Are obese (body mass index of 40 or higher)  Smoke regularly  Caregivers should wear gloves while washing dishes, handling laundry and cleaning bedrooms and bathrooms.  Use caution when washing and drying laundry: Don't shake dirty laundry and use the warmest water setting that you can.  For more tips on managing your health at home, go to www.cdc.gov/coronavirus/2019-ncov/downloads/10Things.pdf.  How can I take care of myself at home?  Get lots of rest. Drink extra fluids (unless a doctor has told you not to).  Take Tylenol (acetaminophen) for fever or pain. If you have liver or kidney problems, ask your family doctor if it's okay to take Tylenol.   Adults can take either:   650 mg (two 325 mg pills) every 4 to 6 hours, or   1,000 mg (two 500 mg pills) every 8 hours as needed.  Note: Don't take more than 3,000 mg in one day. Acetaminophen is found in many medicines (both prescribed and over-the-counter medicines). Read all labels to be sure you don't take too much.   For children, check the Tylenol bottle for the right dose. The dose is based on the child's age or weight.  If you have other health problems (like cancer, heart failure, an organ transplant or severe kidney disease): Call your specialty clinic if you don't feel better in the next 2 days.  Know when to call 911. Emergency warning signs include:  Trouble breathing or shortness of breath  Pain or pressure in the chest that doesn't go away  Feeling confused like you haven't felt before, or not being able to wake up  Bluish-colored lips or face  Your doctor may have prescribed a blood thinner medicine. Follow their instructions.  Where can I get more information?   [facility] - About COVID-19:   [email or web address]  CDC - What to Do If You're Sick: www.cdc.gov/coronavirus/2019-ncov/about/steps-when-sick.html  CDC - Ending Home Isolation:  www.cdc.gov/coronavirus/2019-ncov/hcp/disposition-in-home-patients.html  CDC - Caring for Someone: www.cdc.gov/coronavirus/2019-ncov/if-you-are-sick/care-for-someone.html  [facility] - Interim Guidance for Hospital Discharge to Home: www.health.[facility].[place].us/diseases/coronavirus/hcp/hospdischarge.pdf  Below are the COVID-19 hotlines at the [place] Department of Health ([facility]). Interpreters are available.  For health questions: Call 555-171-8543 or 1-971.466.6055 (7 a.m. to 7 p.m.)  For questions about schools and childcare: Call 795-507-6606 or 1-816.913.2444 (7 a.m. to 7 p.m.)    For informational purposes only. Not to replace the advice of your health care provider. Clinically reviewed by Dr. Doron Tello.   Copyright   2020 [facility]. All rights reserved. [employer] 600691 - REV 01/05/21.

## 2022-08-24 NOTE — PROGRESS NOTES
"Jose is a 37 year old who is being evaluated via a billable video visit.      How would you like to obtain your AVS? Parso  If the video visit is dropped, the invitation should be resent by: Text to cell phone: [phone] pt connected via [employer]   Will anyone else be joining your video visit?         Assessment & Plan     ICD-10-CM    1. Infection due to 2019 novel coronavirus  U07.1 nirmatrelvir and ritonavir (PAXLOVID) therapy pack     - Patient doing relatively well, got from his daughter's   - No issues with breathing   - Is immunocompromised, so do recommend treatment   - Discussed molnupiravir vs. Paxlovid     He is on last few days of 60 days of Budesonide, thinks he can just stop it but will contact GI specialist to confirm      If able to do this, then ok to take Paxlovid, if not will contact me and I will prescribe Molnupiravir instead   - Reviewed medications use and side effects  - Encouraged rest and fluids   - Hand out given in MyChart with warning signs       COVID-19 positive patient.  Encounter for consideration of medication intervention. Patient does qualify for a prescription. Full discussion with patient including medication options, risks and benefits. Potential drug interactions reviewed with patient.     Treatment Planned Paxlovid, Rx sent to [place] pharmacy  [facility] [phone]    [address] [place] [id number]    Hours:   Mon-Fri: 8:00a - 5:00p    Sat: 9:00a - 12:00p    Drive Thru available  Temporary change to home medications: Patient only has a few days left of Budesonide from GI specialist, he will contact GI but plans to just stop medication       Estimated body mass index is 41.98 kg/m  as calculated from the following:    Height as of 12/14/21: 1.88 m (6' 2\").    Weight as of 12/22/21: 148.3 kg (327 lb).  GFR Estimate   Date Value Ref Range Status   12/16/2021 >90 >60 mL/min/1.73m2 Final     Comment:     As of July 11, 2021, eGFR is " calculated by the CKD-EPI creatinine equation, without race adjustment. eGFR can be influenced by muscle mass, exercise, and diet. The reported eGFR is an estimation only and is only applicable if the renal function is stable.   08/14/2020 >60 >60 mL/min/1.73m2 Final     Lab Results   Component Value Date    ZJWPK62IEX Not Detected 06/01/2020         Review of the result(s) of each unique test - See list       6/20/22 - ALT, AST, Creatinine   20 minutes spent on the date of the encounter doing chart review, history and exam, documentation and further activities per the note    The patient indicates understanding of these issues and agrees with the plan.    Return in about 1 week (around 8/31/2022) for if not improving.    PA student as below was present and participated in shadow capacity only    YUDELKA Michelle-S2  [facility] MOHSEN SantosC  [facility]        Ras Garcia is a 37 year old, presenting for the following health issues:  Covid Concern      HPI   COVID-19 Symptom Review  How many days ago did these symptoms start? Started yesterday   Covid positive 8/23/22  Are any of the following symptoms significant for you?  New or worsening difficulty breathing? Yes    Please describe what kind of difficulty you are having breathing:No dyspnea, or dyspnea at patients normal baseline    Worsening cough? Yes, it's a dry cough.     Fever or chills? Yes, the highest temperature was 102     Headache: YES- sinus and frontal     Sore throat: YES    Chest pain: No    Diarrhea: YES- little bit     Body aches? YES    - No loss of taste or smell, yes fatigue     What treatments has patient tried? Acetaminophen - helping with fever   Does patient live in a nursing home, group home, or shelter? No  Does patient have a way to get food/medications during quarantined? Yes, I have a friend or family member who can help me.    - Oxygen about 97%   - 60 day cycle of  Budesonide           Review of Systems   Constitutional, HEENT, cardiovascular, pulmonary, gi and gu systems are negative, except as otherwise noted.      Objective           Vitals:  No vitals were obtained today due to virtual visit.    Physical Exam   GENERAL: Healthy, alert and no distress  EYES: Eyes grossly normal to inspection.  No discharge or erythema, or obvious scleral/conjunctival abnormalities.  RESP: No audible wheeze, cough, or visible cyanosis.  No visible retractions or increased work of breathing.    SKIN: Visible skin clear. No significant rash, abnormal pigmentation or lesions.  NEURO: Cranial nerves grossly intact.  Mentation and speech appropriate for age.  PSYCH: Mentation appears normal, affect normal/bright, judgement and insight intact, normal speech and appearance well-groomed.    Diagnostics: reviewed in Epic         Video-Visit Details    Video Start Time: 8:10 AM    Type of service:  Video Visit    Video End Time:8:21 AM    Originating Location (pt. Location): Home    Distant Location (provider location):  [facility]     Platform used for Video Visit: "1,2,3 Listo"

## 2022-10-21 ENCOUNTER — OFFICE VISIT (OUTPATIENT)
Dept: DERMATOLOGY | Facility: CLINIC | Age: 37
End: 2022-10-21
Payer: COMMERCIAL

## 2022-10-21 DIAGNOSIS — D22.9 MULTIPLE MELANOCYTIC NEVI: ICD-10-CM

## 2022-10-21 DIAGNOSIS — D18.01 CHERRY ANGIOMA: ICD-10-CM

## 2022-10-21 DIAGNOSIS — L82.1 SEBORRHEIC KERATOSIS: ICD-10-CM

## 2022-10-21 DIAGNOSIS — L28.0 LICHEN SIMPLEX CHRONICUS: Primary | ICD-10-CM

## 2022-10-21 PROCEDURE — 99203 OFFICE O/P NEW LOW 30 MIN: CPT | Performed by: DERMATOLOGY

## 2022-10-21 RX ORDER — BETAMETHASONE DIPROPIONATE 0.5 MG/G
OINTMENT, AUGMENTED TOPICAL 2 TIMES DAILY
Qty: 50 G | Refills: 3 | Status: SHIPPED | OUTPATIENT
Start: 2022-10-21 | End: 2024-01-10

## 2022-10-21 ASSESSMENT — PAIN SCALES - GENERAL: PAINLEVEL: NO PAIN (0)

## 2022-10-21 NOTE — PROGRESS NOTES
[facility] Dermatology Note  Encounter Date: Oct 21, 2022  Office Visit     Dermatology Problem List:  1. Hx of compound dysplastic nevus with mild atypia, left upper back  - s/p shave 10/14/2019  2. Angioma, left nasal sidewall  - s/p shave 10/14/2019  3. Lichen simplex chronicus: right dorsal hand - augmented betamethasone ointment  4. Ankylosing spondylitis: ustekinumab, methotrexate  ____________________________________________    Assessment & Plan:    1. Lichen simplex chronicus: on right dorsal hand. Discussed pathophysiology. Will start topicals under occlusion.  - augmented betamethasone 0.05% ointment BID    2. Reassurance provided for benign lesions not treated today including cherry angiomata, seborrheic keratoses, and banal-appearing melanocytic nevi.        Follow-up: 2 years    Staff and Medical Student:     Mel Moran, MS3    Staff Physician:  I was present with the medical student who participated in the service and in the documentation of the note. I have verified the history and personally performed the physical exam and medical decision making. I edited the assessment and plan of care as documented in the note.     Jose Saha MD   of Dermatology  Department of Dermatology  [facility] [employer]    ____________________________________________    CC: No chief complaint on file.    HPI:  Mr. Jose Zuniga is a(n) 37 year old male who presents today as a new patient for FBSE.    Past Medical History:   Negative for skin cancer  Crohn's disease, on methotrexate and Stelara    Family History: Negative for melanoma    Mole on face  Funny-colored spot on left dorsal hand - noticed this summer  Blood blisters on the right arm  Wasp stings on hand over the summer - crusty, bubbly skin - sores open when it gets itchy    Patient is otherwise feeling well, without additional skin concerns.    Labs:  None reviewed.    Physical  Exam:  Vitals: There were no vitals taken for this visit.  SKIN: Full skin, which includes the head/face, both arms, chest, back, abdomen, both legs, buttocks, digits and/or nails, was examined.  - few red macules on the trunk and extremities  - stuck-on appearing tan papule on the left cheek and left dorsal hand  - multiple tan to brown macules and papules on the head/neck, trunk,e xtremities  - No other lesions of concern on areas examined.     Medications:  Current Outpatient Medications   Medication     budesonide (ENTOCORT EC) 3 MG EC capsule     calcium carbonate 500 mg, elemental, (OSCAL 500) 1250 (500 Ca) MG TABS tablet     losartan (COZAAR) 100 MG tablet     methotrexate 2.5 MG tablet     multivitamin (ONE-DAILY) tablet     ustekinumab (STELARA) 90 MG/ML     No current facility-administered medications for this visit.      Past Medical History:   Patient Active Problem List   Diagnosis     Allergic Rhinitis     Localized Primary Osteoarthritis Of The Right Shoulder Glenohumeral Joint     Lower Back Pain     Neck Pain     Lumbar radiculopathy     Crohn's colitis (H)     Anemia     Osteoarthritis of lumbar spine     Osteoarthritis cervical spine     Family history of pulmonary embolism     Ankylosis of sacroiliac joint     Benign essential hypertension     Morbid obesity (H)     Cervical radiculopathy     Crohn's disease of both small and large intestine without complication (H)     Asthma     Past Medical History:   Diagnosis Date     Chronic back pain      Chronic neck pain      Crohn's disease (H)      Eczema     mild, as a child     Hypertension      NO ACTIVE PROBLEMS      Regional enteritis (Crohn's disease) (H)      Markell REYES Referred Self, MD  No address on file on close of this encounter.

## 2022-10-21 NOTE — LETTER
Date:October 21, 2022      Patient was self referred, no letter generated. Do not send.        [facility] Health Information

## 2022-10-21 NOTE — LETTER
10/21/2022       RE: Jose Zuniga  [id number] [facility] [address]     Dear Colleague,    Thank you for referring your patient, Jose Zuniga, to the [facility]ERMAT[facility] [place] [facility]. Please see a copy of my visit note below.    [facility] Dermatology Note  Encounter Date: Oct 21, 2022  Office Visit     Dermatology Problem List:  1. Hx of compound dysplastic nevus with mild atypia, left upper back  - s/p shave 10/14/2019  2. Angioma, left nasal sidewall  - s/p shave 10/14/2019  3. Lichen simplex chronicus: right dorsal hand - augmented betamethasone ointment  4. Ankylosing spondylitis: ustekinumab, methotrexate  ____________________________________________    Assessment & Plan:    1. Lichen simplex chronicus: on right dorsal hand. Discussed pathophysiology. Will start topicals under occlusion.  - augmented betamethasone 0.05% ointment BID    2. Reassurance provided for benign lesions not treated today including cherry angiomata, seborrheic keratoses, and banal-appearing melanocytic nevi.        Follow-up: 2 years    Staff and Medical Student:     Mel Moran, MS3    Staff Physician:  I was present with the medical student who participated in the service and in the documentation of the note. I have verified the history and personally performed the physical exam and medical decision making. I edited the assessment and plan of care as documented in the note.     Jose Saha MD   of Dermatology  Department of Dermatology  [facility] [employer]    ____________________________________________    CC: No chief complaint on file.    HPI:  Mr. Jose Zuniga is a(n) 37 year old male who presents today as a new patient for FBSE.    Past Medical History:   Negative for skin cancer  Crohn's disease, on methotrexate and Stelara    Family History: Negative for  melanoma    Mole on face  Funny-colored spot on left dorsal hand - noticed this summer  Blood blisters on the right arm  Wasp stings on hand over the summer - crusty, bubbly skin - sores open when it gets itchy    Patient is otherwise feeling well, without additional skin concerns.    Labs:  None reviewed.    Physical Exam:  Vitals: There were no vitals taken for this visit.  SKIN: Full skin, which includes the head/face, both arms, chest, back, abdomen, both legs, buttocks, digits and/or nails, was examined.  - few red macules on the trunk and extremities  - stuck-on appearing tan papule on the left cheek and left dorsal hand  - multiple tan to brown macules and papules on the head/neck, trunk,e xtremities  - No other lesions of concern on areas examined.     Medications:  Current Outpatient Medications   Medication     budesonide (ENTOCORT EC) 3 MG EC capsule     calcium carbonate 500 mg, elemental, (OSCAL 500) 1250 (500 Ca) MG TABS tablet     losartan (COZAAR) 100 MG tablet     methotrexate 2.5 MG tablet     multivitamin (ONE-DAILY) tablet     ustekinumab (STELARA) 90 MG/ML     No current facility-administered medications for this visit.      Past Medical History:   Patient Active Problem List   Diagnosis     Allergic Rhinitis     Localized Primary Osteoarthritis Of The Right Shoulder Glenohumeral Joint     Lower Back Pain     Neck Pain     Lumbar radiculopathy     Crohn's colitis (H)     Anemia     Osteoarthritis of lumbar spine     Osteoarthritis cervical spine     Family history of pulmonary embolism     Ankylosis of sacroiliac joint     Benign essential hypertension     Morbid obesity (H)     Cervical radiculopathy     Crohn's disease of both small and large intestine without complication (H)     Asthma     Past Medical History:   Diagnosis Date     Chronic back pain      Chronic neck pain      Crohn's disease (H)      Eczema     mild, as a child     Hypertension      NO ACTIVE PROBLEMS      Regional  enteritis (Crohn's disease) (H)      Markell REYES Referred Self, MD  No address on file on close of this encounter.        Again, thank you for allowing me to participate in the care of your patient.      Sincerely,    Jose Saha MD

## 2022-10-21 NOTE — NURSING NOTE
Dermatology Rooming Note    oJse Zuniga's goals for this visit include:   Chief Complaint   Patient presents with     Skin Check     Jose is here today for a skin check. He is concerned about a mole on his left cheek, left hand and right hand.     Emmy Griffith, [facility]

## 2022-10-23 ENCOUNTER — HEALTH MAINTENANCE LETTER (OUTPATIENT)
Age: 37
End: 2022-10-23

## 2022-10-24 ENCOUNTER — TRANSFERRED RECORDS (OUTPATIENT)
Dept: HEALTH INFORMATION MANAGEMENT | Facility: CLINIC | Age: 37
End: 2022-10-24

## 2022-11-01 ENCOUNTER — TRANSFERRED RECORDS (OUTPATIENT)
Dept: HEALTH INFORMATION MANAGEMENT | Facility: CLINIC | Age: 37
End: 2022-11-01

## 2022-11-02 ENCOUNTER — TRANSFERRED RECORDS (OUTPATIENT)
Dept: HEALTH INFORMATION MANAGEMENT | Facility: CLINIC | Age: 37
End: 2022-11-02

## 2022-11-04 ENCOUNTER — TRANSFERRED RECORDS (OUTPATIENT)
Dept: HEALTH INFORMATION MANAGEMENT | Facility: CLINIC | Age: 37
End: 2022-11-04

## 2022-11-09 ENCOUNTER — TRANSFERRED RECORDS (OUTPATIENT)
Dept: HEALTH INFORMATION MANAGEMENT | Facility: CLINIC | Age: 37
End: 2022-11-09

## 2022-11-09 LAB
ALT SERPL-CCNC: 47 IU/L (ref 0–44)
AST SERPL-CCNC: 29 IU/L (ref 0–40)

## 2022-11-21 ENCOUNTER — TRANSFERRED RECORDS (OUTPATIENT)
Dept: HEALTH INFORMATION MANAGEMENT | Facility: CLINIC | Age: 37
End: 2022-11-21

## 2022-12-22 NOTE — PROGRESS NOTES
SUBJECTIVE:   Jose Zuniga is a 32 y.o. male [facility].  Patient reports that he is feeling better of his neck and upper back.  Today, he rates pain a 2/10 of the bilateral neck and upper back.  He feels a bilateral neck and upper back spasm.     PROCEDURE:  32 y.o. male with occipital, cervical, thoracic, lumbar, sacral, and sacroiliac somatic dysfunctions.      PRE-PROCEDURE DIAGNOSIS: Neck pain, cervical facet syndrome, and chronic lower back pain      PROCEDURE PERFORMED: joint manipulations.      Brief Procedure: The patient understands the risks and benefits of spinal manipulations      Procedure:  The patient has pain, tenderness and spasm at cervical, thoracic and lumbosacral regions.  Left occipital PS, C4, T2, T3, T5, T7, T10,, right sacral base and right SI joint malrotated which are determined by static and motion palpations. Patient was placed on table supine and prone. The occipital, cervical, thoracic, lumbar, sacral, and sacroiliac were manipulated by drop table and hands.  Scalene stretches applied bilaterally.  Electrical stimulation is applied to the bilateral cervical paraspinal and trapezius muscle with infrared therapy.  The patient tolerated the manipulations, scalene stretch , stimulation and infrared therapy without any complications.    DIAGNOSIS:  Diagnoses and all orders for this visit:    Myofascial pain    Cervicalgia    Chronic bilateral low back pain without sciatica    Segmental and somatic dysfunction of head region    Cervical segment dysfunction    Thoracic region somatic dysfunction    Segmental dysfunction of lumbar region    Sacral region somatic dysfunction    Sacroiliac joint somatic dysfunction      DISCUSSION/PLAN:  This is a 32 y.o. male with medical history significant of lumbar radiculopathy, Crohn's colitis, tobacco abuse, right shoulder arthritis, chronic lower back pain who presents today for spinal manipulation.  Patient is  improvement in the neck and upper back.  He still has a lot of stiffness and spasm in his neck and upper back.  I would like to have the patient come in a couple more visit 1 time a week for 2 weeks.  6 regions were manipulated with physical modalities.     Plan:    Home instructions: Heat QID for 10-15 minutes and OTC NSAID prn.    Patient will follow up next week for spinal manipulations.    Frequency:1x week for 2 weeks        Marita Dumont DC, RAOUL, PA-C     general

## 2023-04-02 ENCOUNTER — HEALTH MAINTENANCE LETTER (OUTPATIENT)
Age: 38
End: 2023-04-02

## 2023-06-13 ENCOUNTER — TRANSFERRED RECORDS (OUTPATIENT)
Dept: HEALTH INFORMATION MANAGEMENT | Facility: CLINIC | Age: 38
End: 2023-06-13
Payer: COMMERCIAL

## 2023-06-29 ENCOUNTER — TRANSFERRED RECORDS (OUTPATIENT)
Dept: HEALTH INFORMATION MANAGEMENT | Facility: CLINIC | Age: 38
End: 2023-06-29
Payer: COMMERCIAL

## 2023-07-14 ENCOUNTER — TRANSFERRED RECORDS (OUTPATIENT)
Dept: HEALTH INFORMATION MANAGEMENT | Facility: CLINIC | Age: 38
End: 2023-07-14
Payer: COMMERCIAL

## 2023-07-24 ENCOUNTER — TRANSFERRED RECORDS (OUTPATIENT)
Dept: HEALTH INFORMATION MANAGEMENT | Facility: CLINIC | Age: 38
End: 2023-07-24
Payer: COMMERCIAL

## 2023-09-14 ENCOUNTER — TRANSFERRED RECORDS (OUTPATIENT)
Dept: HEALTH INFORMATION MANAGEMENT | Facility: CLINIC | Age: 38
End: 2023-09-14
Payer: COMMERCIAL

## 2023-09-14 LAB
ALT SERPL-CCNC: 31 IU/L (ref 0–44)
AST SERPL-CCNC: 27 IU/L (ref 0–40)
CREATININE (EXTERNAL): 1.08 MG/DL (ref 0.76–1.27)
GFR ESTIMATED (EXTERNAL): 89 ML/MIN/1.73
GLUCOSE (EXTERNAL): 100 MG/DL (ref 70–99)
POTASSIUM (EXTERNAL): 3.8 MMOL/L (ref 3.5–5.2)

## 2023-10-23 ENCOUNTER — TELEPHONE (OUTPATIENT)
Dept: DERMATOLOGY | Facility: CLINIC | Age: 38
End: 2023-10-23
Payer: COMMERCIAL

## 2023-10-23 NOTE — TELEPHONE ENCOUNTER
Left Voicemail (2nd Attempt) and Sent Mychart (2nd Attempt) for the patient to call back and schedule the following:    Appointment type: Return  Provider: Dr. Saha  Return date: October 2024  Specialty phone number: [phone]

## 2023-10-31 NOTE — PROGRESS NOTES
[facility] OFFICE VISIT NOTE    Patient ID:  Dexter Tolbert [id number] 12/15/1936     The patient is a  86 year old individual who is     Date of Service:   10/31/2023    Chief Complaint:   Depression    Problem List:  There is no problem list on file for this patient.      HPI:   Seen for in-person follow-up     History recurrent major depression    Current doing well     Is age 86, said he is walking with a cane needs things to help him picking things up off the floor, kids look in on him, wants to travel to [place] but not sure he can do by himself he had some problems with some frequent nosebleeds he is getting looked into otherwise has been hanging in there moods have been good denies any depression a enjoys reading reports has a large collection of artifacts, is able to over reflect on traveling to over 80 different countries    Past Psychiatric History:  Depression    Past Medical History:  No past medical history on file.    Family History:  family history is not on file.    Social History:  Social History     Social History Narrative   • Not on file     Retired   has several children    Allergies:  ALLERGIES:  Not on File    Medications:  Current Outpatient Medications   Medication Sig Dispense Refill   • venlafaxine XR (EFFEXOR XR) 150 MG 24 hr capsule Take 1 capsule by mouth daily. 30 capsule 2     No current facility-administered medications for this visit.       Review of Systems:  Denies HA, blurry vision; no recent weight loss.  No chest pain, SOB or palpitations.  No cough, wheezing.  No N,V, D or constipation.  No seizures, extremity weakness or paresthesias.  Hearing impairment  Mental Status Exam:  APPEARANCE: Appropriately dressed  GROOMING: Normally groomed  ATTITUDE:  Appropriate  PSYCHOMOTOR STATE: Normal psychomotor activity walks with a cane  ABNORMAL MOVEMENTS OR POSTURE: Normal movements and posture  EYE CONTACT:  Appropriate  SPEECH:  [facility] Daily Progress     Patient Name: Jose Zuniga  Date: 6/19/2017  Visit #: 4  PTA visit #:    Referral Diagnosis:   724.2 (ICD-9-CM) - M54.5 (ICD-10-CM) - Lumbalgia   724.4 (ICD-9-CM) - M54.16 (ICD-10-CM) - Lumbar radiculitis   722.10 (ICD-9-CM) - M51.26 (ICD-10-CM) - Lumbar disc herniation   729.1 (ICD-9-CM) - M79.1 (ICD-10-CM) - Myofascial pain   723.1 (ICD-9-CM) - M54.2 (ICD-10-CM) - Cervicalgia   723.4 (ICD-9-CM) - M54.12 (ICD-10-CM) - Cervical radiculitis   780.50 (ICD-9-CM) - G47.9 (ICD-10-CM) - Sleep disturbance   721.0 (ICD-9-CM) - M46.92 (ICD-10-CM) - Facet arthritis of cervical region     Referring provider: Rosa Fagan PA-C  Visit Diagnosis:     ICD-10-CM    1. Chronic bilateral low back pain without sciatica M54.5     G89.29    2. Lumbar radiculopathy M54.16    3. Neck pain M54.2    4. Cervical radiculitis M54.12    5. Decreased ROM of neck R29.898          Assessment:     Patient is benefitting from skilled physical therapy and is making steady progress toward functional goals.  Patient is appropriate to continue with skilled physical therapy intervention, as indicated by initial plan of care.     Pt continues to have significant cervical and lumbar stiffness today. He did show some improvements in cervical mobility today with assessment. Increased weight on MEDX today was tolerated well, but could benefit from continued increase in weight to achieve proper fatigue. Pt able to tolerate DE lumbar and rotary torso without pain. Pt feeling less tight after machines, but pain persisted. Pt continues to be motivated and will benefit from continuing PT and MedX program.     Goal Status:  Pt. will be independent with home exercise program in : 4 weeks;Progressing toward  Pt. will report decreased intensity, frequency of : Pain;in 4 weeks;Comment;Progressing toward  Comment:: 50%  [place] in [place] in 6 weeks: (In progress)  Pt will: be able to ride   Loud secondary to hard of hearing  MOOD: Euthymic   AFFECT: Full range  THOUGHT PROCESS:  Logical without any formal thought disorder  THOUGHT CONTENT: Normal thought content without delusions, hallucinations or perceptual disturbances  PERCEPTION: Normal   CONSCIOUSNESS:  Normal level of consciousness  ORIENTATION:  Oriented x3  MEMORY:  IMMEDIATE: Intact                        RECENT: Intact                      REMOTE: Intact  ATTENTION:  Normal attention span  CONCENTRATION: normal  INSIGHT:  Intact  JUDGMENT:  Appropriate judgment  SUICIDAL IDEATIONS: No suicidal ideation  HOMICIDAL IDEATIONS: No homicidal ideation    Assessment:      PRIMARY DIAGNOSIS major depression recurrent moderate remission            Plan:  Overall doing well continue maintenance meds history of relapse off them reviewed issue supportively offered refills call if ineffective follow-up 3 months     Patient seen for 20 minute in-person visit    The patient processed the psychosocial stressors in therapy and showed awareness of discussed coping strategies.      Bryan Goel MD  10/31/2023   his bike without increase in pain in 6 weeks: (In progress)  Pt will: be able to play with his nephew without a flare in back pain in 6 weeks: (In progress)    Plan / Patient Education:     Continue with initial plan of care.  Progress with home program as tolerated.     Next session: review HEP, continue with MedX DE (cervical and lumbar), rotary torso, review HEP PRN, adject MedX DE (increase weight by 20+ #'s for each machine)    Subjective:     Pain Rating: 3    Pain has been manageable. He does feel like it is starting to loosen up some. Doesn't have the nerve pain anymore. He had one small flare, but thinks he knows what caused it with a forced twist.    Objective:     Increased weight on lumbar and cervical MEDX significantly for improved fatigue level- continued high reps to fatigue.    Lumbar MedX Initial testin17 4-week Re-test   AROM (full=  0-72  lumbar) 0-51 deg    Max Extension Torque  203#    Average Extension Torque 146#    Flex: ext ratio (ideal 1.4:1) 3.29:1        Cervical MedX Initial testin17 4-week Re-test   AROM (full= 0-120  cervical)  deg    Max Extension Torque  174#    Flex: ext ratio (ideal 1.4:1) 2.72:1      Cervical ROM   Date: 17    *Indicate scale AROM AROM AROM   Cervical Flexion 30 deg 40 deg    Cervical Extension 20  Deg pain and sting 35 deg     Right Left Right Left Right Left   Cervical Sidebending 10 deg 10 deg       Cervical Rotation 28 deg muscle pull 30 deg muscle pull 45 deg 35 deg       No increases in pain during session    Treatment Today     TREATMENT MINUTES COMMENTS   Evaluation     Self-care/ Home management     Manual therapy     Neuromuscular Re-education     Therapeutic Activity     Therapeutic Exercises 25 See exercise and MedX flowsheets.    Gait training     Modality__________________                Total 25 Pt 8 minutes late to appointment.   Blank areas are intentional and mean the treatment did not include these items.        J Luis LAURA, PT, DPT  6/19/2017

## 2024-01-03 DIAGNOSIS — L28.0 LICHEN SIMPLEX CHRONICUS: ICD-10-CM

## 2024-01-07 RX ORDER — BETAMETHASONE DIPROPIONATE 0.5 MG/G
OINTMENT, AUGMENTED TOPICAL 2 TIMES DAILY
Qty: 50 G | Refills: 3 | OUTPATIENT
Start: 2024-01-07

## 2024-01-07 NOTE — TELEPHONE ENCOUNTER
augmented betamethasone dipropionate (DIPROLENE-AF) 0.05 % external ointment     50 g 3 10/21/2022     Process 1    10/21/2022  [facility] Jose Palomares MD  Dermatology    Nv:none    Refused. Lm on pharm  vm.    Scheduling has been notified to contact the pt for appointment.

## 2024-01-10 ENCOUNTER — HOSPITAL ENCOUNTER (OUTPATIENT)
Facility: CLINIC | Age: 39
Setting detail: OBSERVATION
Discharge: HOME | End: 2024-01-11
Attending: EMERGENCY MEDICINE | Admitting: INTERNAL MEDICINE
Payer: COMMERCIAL

## 2024-01-10 ENCOUNTER — APPOINTMENT (OUTPATIENT)
Dept: NUCLEAR MEDICINE | Facility: CLINIC | Age: 39
End: 2024-01-10
Attending: HOSPITALIST
Payer: COMMERCIAL

## 2024-01-10 ENCOUNTER — APPOINTMENT (OUTPATIENT)
Dept: GENERAL RADIOLOGY | Facility: CLINIC | Age: 39
End: 2024-01-10
Attending: EMERGENCY MEDICINE
Payer: COMMERCIAL

## 2024-01-10 ENCOUNTER — APPOINTMENT (OUTPATIENT)
Dept: CARDIOLOGY | Facility: CLINIC | Age: 39
End: 2024-01-10
Attending: HOSPITALIST
Payer: COMMERCIAL

## 2024-01-10 DIAGNOSIS — E87.6 HYPOKALEMIA: ICD-10-CM

## 2024-01-10 DIAGNOSIS — R07.9 CHEST PAIN, UNSPECIFIED TYPE: ICD-10-CM

## 2024-01-10 LAB
ANION GAP SERPL CALCULATED.3IONS-SCNC: 5 MMOL/L (ref 7–15)
ATRIAL RATE - MUSE: 93 BPM
BASOPHILS # BLD AUTO: 0.1 10E3/UL (ref 0–0.2)
BASOPHILS NFR BLD AUTO: 1 %
BUN SERPL-MCNC: 8.4 MG/DL (ref 6–20)
CALCIUM SERPL-MCNC: 9.3 MG/DL (ref 8.6–10)
CHLORIDE SERPL-SCNC: 102 MMOL/L (ref 98–107)
CREAT SERPL-MCNC: 0.95 MG/DL (ref 0.67–1.17)
D DIMER PPP FEU-MCNC: 0.29 UG/ML FEU (ref 0–0.5)
DEPRECATED HCO3 PLAS-SCNC: 32 MMOL/L (ref 22–29)
DIASTOLIC BLOOD PRESSURE - MUSE: NORMAL MMHG
EGFRCR SERPLBLD CKD-EPI 2021: >90 ML/MIN/1.73M2
EOSINOPHIL # BLD AUTO: 0.3 10E3/UL (ref 0–0.7)
EOSINOPHIL NFR BLD AUTO: 4 %
ERYTHROCYTE [DISTWIDTH] IN BLOOD BY AUTOMATED COUNT: 13.2 % (ref 10–15)
GLUCOSE SERPL-MCNC: 105 MG/DL (ref 70–99)
HCT VFR BLD AUTO: 41.8 % (ref 40–53)
HGB BLD-MCNC: 14.7 G/DL (ref 13.3–17.7)
HOLD SPECIMEN: NORMAL
HOLD SPECIMEN: NORMAL
IMM GRANULOCYTES # BLD: 0 10E3/UL
IMM GRANULOCYTES NFR BLD: 0 %
INTERPRETATION ECG - MUSE: NORMAL
LYMPHOCYTES # BLD AUTO: 1.2 10E3/UL (ref 0.8–5.3)
LYMPHOCYTES NFR BLD AUTO: 16 %
MCH RBC QN AUTO: 29.6 PG (ref 26.5–33)
MCHC RBC AUTO-ENTMCNC: 35.2 G/DL (ref 31.5–36.5)
MCV RBC AUTO: 84 FL (ref 78–100)
MONOCYTES # BLD AUTO: 0.5 10E3/UL (ref 0–1.3)
MONOCYTES NFR BLD AUTO: 6 %
NEUTROPHILS # BLD AUTO: 5.6 10E3/UL (ref 1.6–8.3)
NEUTROPHILS NFR BLD AUTO: 73 %
NRBC # BLD AUTO: 0 10E3/UL
NRBC BLD AUTO-RTO: 0 /100
P AXIS - MUSE: 21 DEGREES
PLATELET # BLD AUTO: 329 10E3/UL (ref 150–450)
POTASSIUM SERPL-SCNC: 2.9 MMOL/L (ref 3.4–5.3)
POTASSIUM SERPL-SCNC: 2.9 MMOL/L (ref 3.4–5.3)
POTASSIUM SERPL-SCNC: 3.6 MMOL/L (ref 3.4–5.3)
PR INTERVAL - MUSE: 154 MS
QRS DURATION - MUSE: 104 MS
QT - MUSE: 352 MS
QTC - MUSE: 437 MS
R AXIS - MUSE: -10 DEGREES
RBC # BLD AUTO: 4.96 10E6/UL (ref 4.4–5.9)
SODIUM SERPL-SCNC: 139 MMOL/L (ref 135–145)
SYSTOLIC BLOOD PRESSURE - MUSE: NORMAL MMHG
T AXIS - MUSE: -71 DEGREES
TROPONIN T SERPL HS-MCNC: 7 NG/L
TROPONIN T SERPL HS-MCNC: 7 NG/L
VENTRICULAR RATE- MUSE: 93 BPM
WBC # BLD AUTO: 7.6 10E3/UL (ref 4–11)

## 2024-01-10 PROCEDURE — 36415 COLL VENOUS BLD VENIPUNCTURE: CPT | Performed by: EMERGENCY MEDICINE

## 2024-01-10 PROCEDURE — 84484 ASSAY OF TROPONIN QUANT: CPT | Performed by: EMERGENCY MEDICINE

## 2024-01-10 PROCEDURE — 93016 CV STRESS TEST SUPVJ ONLY: CPT | Performed by: INTERNAL MEDICINE

## 2024-01-10 PROCEDURE — 84132 ASSAY OF SERUM POTASSIUM: CPT | Mod: 91 | Performed by: HOSPITALIST

## 2024-01-10 PROCEDURE — 85379 FIBRIN DEGRADATION QUANT: CPT | Performed by: HOSPITALIST

## 2024-01-10 PROCEDURE — 80048 BASIC METABOLIC PNL TOTAL CA: CPT | Performed by: EMERGENCY MEDICINE

## 2024-01-10 PROCEDURE — 250N000013 HC RX MED GY IP 250 OP 250 PS 637: Performed by: EMERGENCY MEDICINE

## 2024-01-10 PROCEDURE — 85025 COMPLETE CBC W/AUTO DIFF WBC: CPT | Performed by: EMERGENCY MEDICINE

## 2024-01-10 PROCEDURE — 84484 ASSAY OF TROPONIN QUANT: CPT | Mod: 91 | Performed by: HOSPITALIST

## 2024-01-10 PROCEDURE — 250N000013 HC RX MED GY IP 250 OP 250 PS 637: Performed by: HOSPITALIST

## 2024-01-10 PROCEDURE — 71046 X-RAY EXAM CHEST 2 VIEWS: CPT

## 2024-01-10 PROCEDURE — 36415 COLL VENOUS BLD VENIPUNCTURE: CPT | Performed by: HOSPITALIST

## 2024-01-10 PROCEDURE — 93018 CV STRESS TEST I&R ONLY: CPT | Performed by: INTERNAL MEDICINE

## 2024-01-10 PROCEDURE — 78452 HT MUSCLE IMAGE SPECT MULT: CPT | Mod: 26 | Performed by: INTERNAL MEDICINE

## 2024-01-10 PROCEDURE — 93005 ELECTROCARDIOGRAM TRACING: CPT

## 2024-01-10 PROCEDURE — 78452 HT MUSCLE IMAGE SPECT MULT: CPT

## 2024-01-10 PROCEDURE — 343N000001 HC RX 343: Performed by: HOSPITALIST

## 2024-01-10 PROCEDURE — G0378 HOSPITAL OBSERVATION PER HR: HCPCS

## 2024-01-10 PROCEDURE — 99285 EMERGENCY DEPT VISIT HI MDM: CPT | Mod: 25

## 2024-01-10 PROCEDURE — A9500 TC99M SESTAMIBI: HCPCS | Performed by: HOSPITALIST

## 2024-01-10 PROCEDURE — 99207 PR NO CHARGE LOS: CPT | Performed by: HOSPITALIST

## 2024-01-10 PROCEDURE — 99223 1ST HOSP IP/OBS HIGH 75: CPT | Performed by: INTERNAL MEDICINE

## 2024-01-10 RX ORDER — AMLODIPINE BESYLATE 10 MG/1
10 TABLET ORAL DAILY
COMMUNITY

## 2024-01-10 RX ORDER — FOLIC ACID 1 MG/1
1 TABLET ORAL DAILY
COMMUNITY

## 2024-01-10 RX ORDER — POTASSIUM CHLORIDE 1500 MG/1
40 TABLET, EXTENDED RELEASE ORAL ONCE
Status: COMPLETED | OUTPATIENT
Start: 2024-01-10 | End: 2024-01-10

## 2024-01-10 RX ORDER — AMLODIPINE BESYLATE 10 MG/1
10 TABLET ORAL DAILY
Status: DISCONTINUED | OUTPATIENT
Start: 2024-01-10 | End: 2024-01-11 | Stop reason: HOSPADM

## 2024-01-10 RX ORDER — MAGNESIUM HYDROXIDE/ALUMINUM HYDROXICE/SIMETHICONE 120; 1200; 1200 MG/30ML; MG/30ML; MG/30ML
30 SUSPENSION ORAL EVERY 4 HOURS PRN
Status: DISCONTINUED | OUTPATIENT
Start: 2024-01-10 | End: 2024-01-11 | Stop reason: HOSPADM

## 2024-01-10 RX ORDER — NITROGLYCERIN 0.4 MG/1
0.4 TABLET SUBLINGUAL EVERY 5 MIN PRN
Status: DISCONTINUED | OUTPATIENT
Start: 2024-01-10 | End: 2024-01-10

## 2024-01-10 RX ORDER — ASPIRIN 81 MG/1
324 TABLET, CHEWABLE ORAL ONCE
Status: COMPLETED | OUTPATIENT
Start: 2024-01-10 | End: 2024-01-10

## 2024-01-10 RX ORDER — NITROGLYCERIN 0.4 MG/1
0.4 TABLET SUBLINGUAL EVERY 5 MIN PRN
Status: DISCONTINUED | OUTPATIENT
Start: 2024-01-10 | End: 2024-01-11 | Stop reason: HOSPADM

## 2024-01-10 RX ORDER — FOLIC ACID 1 MG/1
1 TABLET ORAL DAILY
Status: DISCONTINUED | OUTPATIENT
Start: 2024-01-10 | End: 2024-01-11 | Stop reason: HOSPADM

## 2024-01-10 RX ORDER — ASPIRIN 81 MG/1
81 TABLET ORAL DAILY
Status: DISCONTINUED | OUTPATIENT
Start: 2024-01-11 | End: 2024-01-11 | Stop reason: HOSPADM

## 2024-01-10 RX ADMIN — FOLIC ACID 1 MG: 1 TABLET ORAL at 15:24

## 2024-01-10 RX ADMIN — ASPIRIN 81 MG CHEWABLE TABLET 162 MG: 81 TABLET CHEWABLE at 03:38

## 2024-01-10 RX ADMIN — POTASSIUM CHLORIDE 40 MEQ: 1500 TABLET, EXTENDED RELEASE ORAL at 04:26

## 2024-01-10 RX ADMIN — AMLODIPINE BESYLATE 10 MG: 10 TABLET ORAL at 15:24

## 2024-01-10 RX ADMIN — Medication 32 MILLICURIE: at 10:41

## 2024-01-10 ASSESSMENT — ACTIVITIES OF DAILY LIVING (ADL)
ADLS_ACUITY_SCORE: 35
ADLS_ACUITY_SCORE: 31
ADLS_ACUITY_SCORE: 35
ADLS_ACUITY_SCORE: 31
ADLS_ACUITY_SCORE: 35

## 2024-01-10 NOTE — PROGRESS NOTES
RECEIVING UNIT ED HANDOFF REVIEW    ED Nurse Handoff Report was reviewed by: Salma Escobedo RN on January 10, 2024 at 9:28 AM

## 2024-01-10 NOTE — ED PROVIDER NOTES
History     Chief Complaint:  Chest Pain       The history is provided by the patient.      Jose Zuniga is a 38 year old male history of hypertension, crohn's disease, and family history of heart disease who presents to the ED for chest pain. The patient reports that at 0200 he woke up from the couch to lay in bed and went to move the dog and adjust his sleep number bed when he began experiencing a sudden zapping 7-7.5/10 central chest pain that lasted for 12-15 seconds. He adds that with this he was experiencing an uneasy feeling from this pain. He adds that he is feeling nervous because his uncle  within the last 3 months of a massive heart attack; uncle was 71 years old. He adds that on his father's side his dad  of a PE in his 60s and his grandpa  of his heart attack in his 60s. He states that he has a lingering cough for a couple weeks but notes he has young children that spread illness from . He reports that he is experiencing pins and needles in his left arm. He adds that he had COVID just after thanksgi along with all his family members but denies lingering symptoms of COVID. He reports that he infrequently uses alcohol use but adds that he has not bought a case of beer for the house in 6 months. 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 He reports that he works regular hours. He states that he has taken two baby Asprin today but denies regular use of Asprin. He denies current zapping chest discomfort. He denies recent nausea, lightheadedness, radiating pain, having these symptoms before, shortness of breath with the chest pain, back pain, abdominal pain, personal history of heart disease, recent travel since , history of smoking, or history of vaping.     Independent Historian:   None - Patient Only    Review of  "External Notes:   None       Medications:    Amlodipine  Losartan  Budesonide  Calcium carbonate   Methotrexate  Multivitamin  Ustekinumab    Past Medical History:    Chronic back pain  Chronic neck pain  Crohn's disease  Eczema  Hypertension  Regional enteritis (Crohn's disease)   Shingles  Asthma  Morbid obesity  Cervical radiculopathy  Anemia  Osteoarthritis of lumbar spine  Osteoarthritis cervical spine  Ankylosis of sacroiliac joint  Localized Primary Osteoarthritis Of The Right Shoulder Glenohumeral Joint  Tobacco abuse   Phlegmon   Abscess   Myalgia And Myositis     Past Surgical History:    Colonoscopy   Appendectomy  Orthopedic surgery   C shldr arthroscop,diagnostic (Left)   Bowel resection  Hc removal of tonsils,<13 y/o   Shoulder surgery (Right)  Lap partial ileocolectomy       Physical Exam   Patient Vitals for the past 24 hrs:   BP Temp Temp src Pulse Resp SpO2 Height Weight   01/10/24 0500 (!) 139/93 98  F (36.7  C) Oral 80 10 97 % -- --   01/10/24 0330 (!) 162/102 -- -- 89 11 95 % -- --   01/10/24 0313 (!) 201/120 98.8  F (37.1  C) Temporal 102 18 97 % 1.88 m (6' 2\") 142.9 kg (315 lb)        Physical Exam    Physical Exam   Constitutional:  Patient is oriented to person, place, and time. They appear well-developed and well-nourished. Mild distress secondary to chest pain.    HENT:   Eyes:    Conjunctivae normal and EOM are normal. Pupils are equal, round, and reactive to light.   Neck:    Normal range of motion.   Cardiovascular: Normal rate, regular rhythm and normal heart sounds.  Exam reveals no gallop and no friction rub.  No murmur heard.  No reproducible tenderness  Pulmonary/Chest:  Effort normal and breath sounds normal. Patient has no wheezes. Patient has no rales.  No pleuritic pain.  Abdominal:   Soft. Bowel sounds are normal. Patient exhibits no mass. There is no tenderness. There is no rebound and no guarding.   Musculoskeletal:  Normal range of motion. Patient exhibits no edema. "   Neurological:   Patient is alert and oriented to person, place, and time. Patient has normal strength. No cranial nerve deficit or sensory deficit. GCS 15  Skin:   Skin is warm and dry. No rash noted. No erythema.   Psychiatric:   Patient has a normal mood and affect. Patient's behavior is normal. Judgment and thought content normal.      Emergency Department Course   ECG  ECG results from 01/10/24   EKG 12 lead     Value    Systolic Blood Pressure     Diastolic Blood Pressure     Ventricular Rate 93    Atrial Rate 93    KS Interval 154    QRS Duration 104        QTc 437    P Axis 21    R AXIS -10    T Axis -71    Interpretation ECG      Sinus rhythm  Possible Anterior infarct , age undetermined  ST & T wave abnormality, consider inferolateral ischemia  Abnormal ECG  When compared with ECG of 22-APR-2019 10:01,  Borderline criteria for Anterior infarct are now Present  T wave inversion more evident in Inferior leads  T wave inversion now evident in Lateral leads  ECG taken at 0305, ECG read at 0310       Imaging:  XR Chest 2 Views   Final Result   IMPRESSION: Negative chest.             Laboratory:  Labs Ordered and Resulted from Time of ED Arrival to Time of ED Departure   BASIC METABOLIC PANEL - Abnormal       Result Value    Sodium 139      Potassium 2.9 (*)     Chloride 102      Carbon Dioxide (CO2) 32 (*)     Anion Gap 5 (*)     Urea Nitrogen 8.4      Creatinine 0.95      GFR Estimate >90      Calcium 9.3      Glucose 105 (*)    TROPONIN T, HIGH SENSITIVITY - Normal    Troponin T, High Sensitivity 7     CBC WITH PLATELETS AND DIFFERENTIAL    WBC Count 7.6      RBC Count 4.96      Hemoglobin 14.7      Hematocrit 41.8      MCV 84      MCH 29.6      MCHC 35.2      RDW 13.2      Platelet Count 329      % Neutrophils 73      % Lymphocytes 16      % Monocytes 6      % Eosinophils 4      % Basophils 1      % Immature Granulocytes 0      NRBCs per 100 WBC 0      Absolute Neutrophils 5.6      Absolute  Lymphocytes 1.2      Absolute Monocytes 0.5      Absolute Eosinophils 0.3      Absolute Basophils 0.1      Absolute Immature Granulocytes 0.0      Absolute NRBCs 0.0     TROPONIN T, HIGH SENSITIVITY   POTASSIUM      Emergency Department Course & Assessments:           Interventions:  Medications   aspirin EC tablet 81 mg (has no administration in time range)   nitroGLYcerin (NITROSTAT) sublingual tablet 0.4 mg (has no administration in time range)   alum & mag hydroxide-simethicone (MAALOX) suspension 30 mL (has no administration in time range)   aspirin (ASA) chewable tablet 324 mg (162 mg Oral $Given 1/10/24 0338)   potassium chloride ER (KLOR-CON M) CR tablet 40 mEq (40 mEq Oral $Given 1/10/24 0426)          Independent Interpretation (X-rays, CTs, rhythm strip):  I reviewed Chest X-Ray; agree no infiltrate    Assessments/Consultations/Discussion of Management or Tests:  ED Course as of 01/10/24 0556   Wed Da 10, 2024   0332 I obtained history and examined the patient as noted above.    0423 I spoke with Dr. Lizama, hospitalist, who agreed to admit the patient.        Social Determinants of Health affecting care:   None    Disposition:  The patient was admitted to the hospital under the care of Dr. Lizama.     Impression & Plan    CMS Diagnoses:   HEART Score  Background  Calculates the overall risk of adverse event in patient's presenting with chest pain.  Based on 5 criteria (each assigned 0-2 points) including suspiciousness of history, EKG, age, risk factors and troponin.    Data  38 year old male  has Allergic Rhinitis; Localized Primary Osteoarthritis Of The Right Shoulder Glenohumeral Joint; Lower Back Pain; Neck Pain; Lumbar radiculopathy; Crohn's colitis (H); Anemia; Osteoarthritis of lumbar spine; Osteoarthritis cervical spine; Family history of pulmonary embolism; Ankylosis of sacroiliac joint; Benign essential hypertension; Morbid obesity (H); Cervical radiculopathy; Crohn's disease of both small and  large intestine without complication (H); and Asthma on their problem list.   reports that he has never smoked. He quit smokeless tobacco use about 4 years ago.  His smokeless tobacco use included chew.  family history includes Cancer in his maternal grandmother; Colon Cancer in his paternal grandmother; Heart Disease in his father, maternal grandmother, paternal grandfather, and paternal grandmother; Hypertension in his mother; Kidney Disease in his father; Lipids in his mother; Pulmonary Embolism in his father and mother.  Recent Labs   Lab 01/10/24  0348   CTROPT 7     Criteria   0-2 points for each of 5 items (maximum of 10 points):  Score 0- History slightly suspicious for coronary syndrome  Score 2- EKG with Significant ST Depression  Score 0- Age <45 years old  Score 2- Three or more risk factors for or history of atherosclerotic disease  Score 0- Within normal limits for troponin levels  Interpretation  Risk of adverse outcome  Heart Score: 4  Total Score 4-6- Adverse Outcome Risk 20.3% - Supports admission with standard rule-out management -serial troponins and stress testing    Medical Decision Making:  Jose Zuniga is a 38-year-old gentleman presenting with a brief episode of chest discomfort within an hour prior to arrival.  He has never had this before.  He has no known history of coronary artery disease but does have multiple risk factors.  EKG was performed which does show significant ST depression in many leads.  He has no evidence of STEMI.  He has mild hypertension here.  Chest x-ray was performed that does not show any acute abnormalities such as infiltrate, effusion, abnormal mediastinum, pneumothorax or mass.  His symptoms were not consistent or concerning for dissection.  He is not hypoxic or complaint of feeling short of breath or have risk factors for PE.  His cardiac enzyme is detectable.  Otherwise no acute metabolic derangement.  His heart score does place him at a 4 which puts him at  moderate risk and would support bringing him to hospital for ruling out significant heart disease.  He has been pain-free ever since arrival.  At this point given his abnormal EKG and risk factors I will admit him for further workup.        Diagnosis:    ICD-10-CM    1. Hypokalemia  E87.6       2. Chest pain, unspecified type  R07.9            Discharge Medications:  New Prescriptions    No medications on file          Scribe Disclosure:  I, Aimee Fish, am serving as a scribe at 4:19 AM on 1/10/2024 to document services personally performed by Ashtyn Mcnally MD based on my observations and the provider's statements to me.     1/10/2024   Ashtyn Mcnally MD Bochert, Michelle Ann, MD  01/10/24 0558

## 2024-01-10 NOTE — ED TRIAGE NOTES
Pt statwes had sudden onset of sharp shock of pain across chest 7-+8/10 initially but around 3/10. Reports family Hx of Heart problems and increased stressed.      Triage Assessment (Adult)       Row Name 01/10/24 0314          Triage Assessment    Airway WDL WDL        Respiratory WDL    Respiratory WDL WDL        Skin Circulation/Temperature WDL    Skin Circulation/Temperature WDL WDL        Cardiac WDL    Cardiac WDL chest pain        Chest Pain Assessment    Chest Pain Location substernal;midsternal     Chest Pain Radiation arm        Peripheral/Neurovascular WDL    Peripheral Neurovascular WDL WDL        Cognitive/Neuro/Behavioral WDL    Cognitive/Neuro/Behavioral WDL WDL

## 2024-01-10 NOTE — ED NOTES
"[facility]  ED Nurse Handoff Report    ED Chief complaint: Chest Pain      ED Diagnosis:   Final diagnoses:   Hypokalemia   Chest pain, unspecified type       Code Status: Full Code    Allergies: No Known Allergies    Patient Story: Chest pain  Focused Assessment:  Pt was sleeping at home when he had a few seconds of a \"shock\" type sensation in his chest lasting a few seconds. 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     Treatments and/or interventions provided: Aspirin, labs, K-Clor tabs, chest xray  Patient's response to treatments and/or interventions: Pain had resolved prior to coming to the ED.    To be done/followed up on inpatient unit:  Unknown - serial troponin levels?    Does this patient have any cognitive concerns?:  Alert and oriented    Activity level - Baseline/Home:  Independent  Activity Level - Current:   Independent    Patient's Preferred language: English   Needed?: No    Isolation: None  Infection: Not Applicable  Patient tested for COVID 19 prior to admission: NO  Bariatric?: No    Vital Signs:   Vitals:    01/10/24 0313 01/10/24 0330   BP: (!) 201/120 (!) 162/102   Pulse: 102 89   Resp: 18 11   Temp: 98.8  F (37.1  C)    TempSrc: Temporal    SpO2: 97% 95%   Weight: 142.9 kg (315 lb)    Height: 1.88 m (6' 2\")        Cardiac Rhythm:Cardiac Rhythm: Sinus tachycardia    Was the PSS-3 completed:   Yes  What interventions are required if any?               Family Comments: Wife is at the bedside.  OBS brochure/video discussed/provided to patient/family: Yes              Name of person given brochure if not patient: NA              Relationship to patient: NA    For the majority of the shift this patient's behavior was Green.   Behavioral interventions performed were NA .    ED NURSE PHONE " NUMBER: [phone]

## 2024-01-10 NOTE — PHARMACY-ADMISSION MEDICATION HISTORY
Pharmacist Admission Medication History    Admission medication history is complete. The information provided in this note is only as accurate as the sources available at the time of the update.    Information Source(s): Patient and CareEverywhere/SureScripts via in-person    Pertinent Information: none    Changes made to PTA medication list:  Added: folic acid  Deleted: losartan, budesonide, betamethasone, calcium, and multivitamin  Changed: None    Medication Affordability:  Not including over the counter (OTC) medications, was there a time in the past 3 months when you did not take your medications as prescribed because of cost?: No    Allergies reviewed with patient and updates made in EHR: yes    Medication History Completed By: Victorino Peter PharmD 1/10/2024 8:04 AM    PTA Med List   Medication Sig Note Last Dose    amLODIPine (NORVASC) 10 MG tablet Take 10 mg by mouth daily  1/9/2024    folic acid (FOLVITE) 1 MG tablet Take 1 mg by mouth daily  1/9/2024    methotrexate 2.5 MG tablet Take 5 tablets (12.5 mg) by mouth every 7 days 1/10/2024: Usually takes Sunday or Wednesday Past Week    ustekinumab (STELARA) 90 MG/ML Inject 90 mg Subcutaneous Every 42 days  Past Month

## 2024-01-10 NOTE — PROGRESS NOTES
Observation goals  PRIOR TO DISCHARGE        Comments: List all goals to be met before discharge home:  - Serial troponins and stress test complete. Not Met  - Seen and cleared by consultant if applicable Not Met  - Adequate pain control on oral analgesia Met  - Vital signs normal or at patient baseline Met  - Safe disposition plan has been identified Not Met  - Nurse to notify provider when observation goals have been met and patient is ready for discharge.

## 2024-01-10 NOTE — H&P
"[facility]    History and Physical - Hospitalist Service       Date of Admission:  1/10/2024     Assessment & Plan      Jose Zuniga is a 38 year old male with a history of Hypertension, Chrohn's, Eczema who is admitted on 1/10/2024 with chest pain.     Atypical chest pain  Described 15 seconds of \"zapping\" chest pain without radiation or associated symptoms without recurrence. Left arm tingling while driving prior to arrival, resolved without recurrence. Troponin 7 but does have diffuse ST/T changes in the inferolateral leads. Warrants further observation and work up.  Lipid panel 10/23 shows total cholesterol 125, HDL 34, LDL 61, Trigs 150.   -cardiac monitoring  -repeat troponin at 2 hours  -if troponin flat would plan for cardiac stress test  -if troponin significantly increasing need to consider TTE, cardiology consult, +/- heparin  -ASA    Hypertension  -continue PTA amlodipine once reconciled    Crohn's disease  S/p partial ileocolectomy in 2019.   -continue methotrexate    Hypokalemia  K is 2.9, possibly due to Crohn's and loose stools.   -replace per protocol       Diet:  Regular  DVT Prophylaxis: Ambulate every shift  Code Status:  Full    Disposition: Obs for cardiac work up. Possible discharge on Wednesday if negative.     Clinically Significant Risk Factors Present on Admission        # Hypokalemia: Lowest K = 2.9 mmol/L in last 2 days, will replace as needed             # Hypertension: Noted on problem list        # Severe Obesity: Estimated body mass index is 40.44 kg/m  as calculated from the following:    Height as of this encounter: 1.88 m (6' 2\").    Weight as of this encounter: 142.9 kg (315 lb).               Michael Lizama,   Hospitalist Service  [facility]  Securely message with [employer] (more info)  Text page via [employer] Paging/Directory     ______________________________________________________________________    Chief Complaint   Chest " "pain    History is obtained from the patient, wife and ED physician    History of Present Illness   Jose Zuniga is a 38 year old male who has a history of hypertension and Crohn's who presents to the ED with chest pain. He fell asleep on the couch last night, woke up about 2am and while going to his bed noted a \"zap\" in his center chest, lasting about 15 seconds and making him feel very uneasy. He denies any radiation of the pain, did not feel like it was sharp or tearing, and no pain to his back. He did not have a heaviness in his chest. No associated shortness of breath, nausea or diaphoresis. The pain has not returned since but he did note that as they drove up from [place] that he had some tingling in his left arm. He has never had symptoms like this before. He doesn't exercise currently but in the summer did not have any chest pain while mowing their 3/4 acre lot. No chest pain with normal exertion. He is under a lot of stress now for work which he wonders if it is contributing. He also notes that he has strong family history of heart disease on both sides of his family. His uncle  of a heart attack three months ago at the age of 71.     In the ED he has been pain free and resting comfortably. CXR negative. Troponin 7. Potassium 2.9. EKG did show some diffuse ST/T changes in leads V3-6, I, II, III and aVF. There was an old EKG from 2019 that had t-wave inversions in III otherwise looked fairly normal.       Past Medical History    Past Medical History:   Diagnosis Date    Chronic back pain     Chronic neck pain     Crohn's disease (H)     Eczema     mild, as a child    Hypertension     NO ACTIVE PROBLEMS     Osteoarthritis     Regional enteritis (Crohn's disease) (H)     Shingles        Past Surgical History   Past Surgical History:   Procedure Laterality Date    APPENDECTOMY      C SHLDR ARTHROSCOP,DIAGNOSTIC Left x2    Description: Arthroscopy Shoulder Left;  Proc Date: 2000;  Comments: " labral repair by Dr Hector    COLON SURGERY      bowel resection may 2019    COLONOSCOPY N/A 8/28/2015    Procedure: COLONOSCOPY with biopsies using forceps;  Surgeon: Hina Madison MD;  Location: [facility];  Service:     HC REMOVAL OF TONSILS,<13 Y/O      Description: Tonsillectomy;  Recorded: 09/04/2013;  Comments: childhood    ORTHOPEDIC SURGERY  2000    left shoulder surgery    OTHER SURGICAL HISTORY  [facility]    SHOULDER SURGERY Right     ZZC APPENDECTOMY      Description: Appendectomy;  Proc Date: 01/01/2005;       Prior to Admission Medications   Prior to Admission Medications   Prescriptions Last Dose Informant Patient Reported? Taking?   amLODIPine (NORVASC) 10 MG tablet   Yes Yes   Sig: Take 10 mg by mouth daily   augmented betamethasone dipropionate (DIPROLENE-AF) 0.05 % external ointment   No No   Sig: Apply topically 2 times daily   budesonide (ENTOCORT EC) 3 MG EC capsule   Yes No   Sig: TAKE 3 CAPSULES BY MOUTH ONCE DAILY   Patient not taking: Reported on 10/21/2022   calcium carbonate 500 mg, elemental, (OSCAL 500) 1250 (500 Ca) MG TABS tablet   Yes No   Sig: Take 500 mg by mouth   losartan (COZAAR) 100 MG tablet More than a month  No Yes   Sig: Take 1 tablet (100 mg) by mouth daily   methotrexate 2.5 MG tablet   Yes No   Sig: Take 5 tablets (12.5 mg) by mouth every 7 days   multivitamin (ONE-DAILY) tablet   Yes No   Sig: Take 1 tablet by mouth daily   ustekinumab (STELARA) 90 MG/ML   Yes No   Sig: Inject 90 mg Subcutaneous      Facility-Administered Medications: None        Review of Systems    The 10 point Review of Systems is negative other than noted in the HPI or here.     Social History   I have reviewed this patient's social history and updated it with pertinent information if needed.  Rare alcohol use currently.   Social History     Tobacco Use    Smoking status: Never    Smokeless tobacco: Former      Types: Chew     Quit date: 4/30/2019    Tobacco comments:     No exposure   Vaping Use    Vaping Use: Never used   Substance Use Topics    Alcohol use: Yes     Alcohol/week: 2.0 standard drinks of alcohol     Comment: 1 drink per week    Drug use: No     Comment: Drug use: 2 drinks per week        Physical Exam   Vital Signs: Temp: 98.8  F (37.1  C) Temp src: Temporal BP: (!) 162/102 Pulse: 89   Resp: 11 SpO2: 95 % O2 Device: None (Room air)    Weight: 315 lbs 0 oz    Gen: sitting in bed, appears comfortable  CV: RRR, no m/r/g  Pulm: CTAB, no wheeze or rhonchi  GI: +BS, soft, NT/ND  Lymph: no edema    Medical Decision Making       60 MINUTES SPENT BY ME on the date of service doing chart review, history, exam, documentation & further activities per the note.      Data     I have personally reviewed the following data over the past 24 hrs:    7.6  \   14.7   / 329     139 102 8.4 /  105 (H)   2.9 (L) 32 (H) 0.95 \     Trop: 7 BNP: N/A       Imaging results reviewed over the past 24 hrs:   Recent Results (from the past 24 hour(s))   XR Chest 2 Views    Narrative    EXAM: XR CHEST 2 VIEWS  LOCATION: [facility]  DATE: 1/10/2024    INDICATION: cp  COMPARISON: 11/21/2021      Impression    IMPRESSION: Negative chest.

## 2024-01-10 NOTE — PROGRESS NOTES
Hospitalist brief update note    Chart reviewed and patient was seen this afternoon.  Had first part of Lexiscan and second part planned tomorrow.  Reports no recurrence of chest pain since admission.  No tingling numbness in his left arm since admission.  Denies shortness of breath or dizziness.  No tachycardia.  Father had PE and passed away due to it at age 62.  -Check D-dimer, CT PE if elevated.  -Will complete stress test second part in a.m.  -Home meds reviewed and reordered, resume methotrexate and Stelara at discharge  -Care plan discussed with patient and nursing staff    Bin Wagner MD  Hospitalist

## 2024-01-10 NOTE — PLAN OF CARE
PRIMARY Concern: Chest pain  SAFETY RISK Concerns (fall risk, behaviors, etc.): none      Isolation/Type: none  Tests/Procedures for NEXT shift: 2nd part of stress test tomorrow  Consults? (Pending/following, signed-off?) none  Where is patient from? (Home, TCU, etc.): home  Other Important info for NEXT shift: none  Anticipated DC date & active delays: tomorrow pending results of stress test  _____________________________________________________________________________  SUMMARY NOTE:  Orientation/Cognitive: A&Ox4  Observation Goals (Met/ Not Met): Not Met  Mobility Level/Assist Equipment: Independent  Antibiotics & Plan (IV/po, length of tx left): n/a  Pain Management: denies pain  Tele/VS/O2: Tele is NSR  ABNL Lab/BG: WDL  Diet: tolerating a regular diet, to be NPO at midnight  Bowel/Bladder: WDL  Skin Concerns: WDL  Drains/Devices: PIV SL  Patient Stated Goal for Today: get something to eat

## 2024-01-10 NOTE — PROGRESS NOTES
Diagnosis: Chest Pain  Orientation: A&Ox4  Activity Level: Independent   Fall Risk: No  Behavior & Aggression: Green  Pain Management: Had chest pain 8/10 upon arrival, denies pain for this nurse.   ABNL VS/O2: Hypertensive at times otherwise VSS on RA.   Tele: Sinus tach  ABNL Lab/BG: K: 2.9.  Diet: Regular, no caffeine   Bowel/Bladder: Continent of B&B, up to the bathroom.   Drains/Devices: PIV SL  Tests/Procedures: Trop: 7, Chest XR and EKG (see results).  Anticipated DC Date: TBD  Other Important Info: On potassium protocol.

## 2024-01-11 ENCOUNTER — APPOINTMENT (OUTPATIENT)
Dept: NUCLEAR MEDICINE | Facility: CLINIC | Age: 39
End: 2024-01-11
Attending: HOSPITALIST
Payer: COMMERCIAL

## 2024-01-11 VITALS
OXYGEN SATURATION: 93 % | DIASTOLIC BLOOD PRESSURE: 89 MMHG | HEART RATE: 72 BPM | HEIGHT: 74 IN | BODY MASS INDEX: 40.43 KG/M2 | SYSTOLIC BLOOD PRESSURE: 132 MMHG | WEIGHT: 315 LBS | RESPIRATION RATE: 18 BRPM | TEMPERATURE: 98.8 F

## 2024-01-11 LAB
ANION GAP SERPL CALCULATED.3IONS-SCNC: 5 MMOL/L (ref 7–15)
BUN SERPL-MCNC: 10.7 MG/DL (ref 6–20)
CALCIUM SERPL-MCNC: 9.1 MG/DL (ref 8.6–10)
CHLORIDE SERPL-SCNC: 103 MMOL/L (ref 98–107)
CREAT SERPL-MCNC: 0.9 MG/DL (ref 0.67–1.17)
CV STRESS MAX HR HE: 173
DEPRECATED HCO3 PLAS-SCNC: 31 MMOL/L (ref 22–29)
EGFRCR SERPLBLD CKD-EPI 2021: >90 ML/MIN/1.73M2
GLUCOSE SERPL-MCNC: 101 MG/DL (ref 70–99)
NUC STRESS EJECTION FRACTION: 70 %
POTASSIUM SERPL-SCNC: 3.6 MMOL/L (ref 3.4–5.3)
RATE PRESSURE PRODUCT: NORMAL
SODIUM SERPL-SCNC: 139 MMOL/L (ref 135–145)
STRESS ANGINA INDEX: 0
STRESS ECHO BASELINE DIASTOLIC HE: 98
STRESS ECHO BASELINE HR: 83 BPM
STRESS ECHO BASELINE SYSTOLIC BP: 152
STRESS ECHO CALCULATED PERCENT HR: 96 %
STRESS ECHO LAST STRESS DIASTOLIC BP: 104
STRESS ECHO LAST STRESS SYSTOLIC BP: 188
STRESS ECHO POST ESTIMATED WORKLOAD: 10 METS
STRESS ECHO POST EXERCISE DUR MIN: 8 MIN
STRESS ECHO POST EXERCISE DUR SEC: 40 SEC
STRESS ECHO TARGET HR: 180

## 2024-01-11 PROCEDURE — G0378 HOSPITAL OBSERVATION PER HR: HCPCS

## 2024-01-11 PROCEDURE — 250N000013 HC RX MED GY IP 250 OP 250 PS 637: Performed by: INTERNAL MEDICINE

## 2024-01-11 PROCEDURE — A9502 TC99M TETROFOSMIN: HCPCS

## 2024-01-11 PROCEDURE — 99239 HOSP IP/OBS DSCHRG MGMT >30: CPT | Performed by: HOSPITALIST

## 2024-01-11 PROCEDURE — 343N000001 HC RX 343

## 2024-01-11 PROCEDURE — 80048 BASIC METABOLIC PNL TOTAL CA: CPT | Performed by: HOSPITALIST

## 2024-01-11 PROCEDURE — 250N000013 HC RX MED GY IP 250 OP 250 PS 637: Performed by: HOSPITALIST

## 2024-01-11 PROCEDURE — 36415 COLL VENOUS BLD VENIPUNCTURE: CPT | Performed by: HOSPITALIST

## 2024-01-11 RX ADMIN — Medication 35.8 MILLICURIE: at 07:30

## 2024-01-11 RX ADMIN — FOLIC ACID 1 MG: 1 TABLET ORAL at 09:24

## 2024-01-11 RX ADMIN — ASPIRIN 81 MG: 81 TABLET, COATED ORAL at 09:25

## 2024-01-11 RX ADMIN — AMLODIPINE BESYLATE 10 MG: 10 TABLET ORAL at 09:25

## 2024-01-11 ASSESSMENT — ACTIVITIES OF DAILY LIVING (ADL)
ADLS_ACUITY_SCORE: 31

## 2024-01-11 NOTE — PROGRESS NOTES
Observation goals  PRIOR TO DISCHARGE         List all goals to be met before discharge home:   - Serial troponins and stress test complete-not met.  Completing part 2 of NM Lexiscan .  - Seen and cleared by consultant if applicable-N/A.   - Adequate pain control on oral analgesia-met.  Denies chest pain.   - Vital signs normal or at patient baseline-Met at this time.   - Safe disposition plan has been identified-Met.  Plans to discharge to home once medically cleared.

## 2024-01-11 NOTE — PROGRESS NOTES
Observation goals  PRIOR TO DISCHARGE        Comments: List all goals to be met before discharge home  - Serial troponins and stress test complete-not met  - Seen and cleared by consultant if applicable-not met  - Adequate pain control on oral analgesia-met  - Vital signs normal or at patient baseline-partially met.   - Safe disposition plan has been identified-not met.   - Nurse to notify provider when observation goals have been met and patient is ready for discharge.

## 2024-01-11 NOTE — PLAN OF CARE
Goal Outcome Evaluation:    Top portion must ALWAYS be completed  PRIMARY Concern: Chest pain.  SAFETY RISK Concerns (fall risk, behaviors, etc.): Green.      Tests/Procedures for NEXT shift: NM lexiscan completed and resulted.     Consults? (Pending/following, signed-off?) None.  Where is patient from? (Home, TCU, etc.): Home.  Other Important info for NEXT shift: Tolerated regular diet prior to discharge.     Anticipated DC date & active delays: Today.   _____________________________________________________________________________  SUMMARY NOTE:              (either paste note here OR complete the info below- RN to choose one- delete info below if not used)  Orientation/Cognitive: A/O X4  Observation Goals (Met/ Not Met): Met.    Mobility Level/Assist Equipment: Independent.   Antibiotics & Plan (IV/po, length of tx left): None.   Pain Management: No pain this shift, no chest pain or discomfort.   Tele/VS/O2: NSR.   ABNL Lab/BG: All normal.   Diet: Regular diet.   Bowel/Bladder: continent.   Skin Concerns: No skin concerns.   Drains/Devices: PIV removed prior to discharge.   Patient Stated Goal for Today: discharge home.       Summary of events:  Patient completed part 2 of NM lexiscan with negative findings.  Patient VSS on RA at time of discharge with no reports of chest pain or shortness of breath.  Patient instructed at time of discharge to follow-up with his primary Md in the next 10-14 days post hospital admission.  Up independent at time of discharge.  No new medications at time of discharge.   Discharge education completed.  All belongings returned to patient.

## 2024-01-11 NOTE — DISCHARGE SUMMARY
"[facility]  Hospitalist Discharge Summary      Date of Admission:  1/10/2024  Date of Discharge:  1/11/2024  Discharging Provider: Bin Wagner MD  Discharge Service: Hospitalist Service    Discharge Diagnoses     Please refer to the hospital course below     Clinically Significant Risk Factors     # Severe Obesity: Estimated body mass index is 40.44 kg/m  as calculated from the following:    Height as of this encounter: 1.88 m (6' 2\").    Weight as of this encounter: 142.9 kg (315 lb).       Follow-ups Needed After Discharge   Follow-up Appointments     Follow-up and recommended labs and tests       Follow up with primary care provider, [facility],   within 10-14 days for hospital follow- up.             Unresulted Labs Ordered in the Past 30 Days of this Admission       No orders found for last 31 day(s).        These results will be followed up by none    Discharge Disposition   Discharged to home  Condition at discharge: Stable    Hospital Course     Jose Zuniga is a 38 year old male with a history of Hypertension, Chrohn's, Eczema who is admitted on 1/10/2024 with chest pain.      Atypical chest pain  Described 15 seconds of \"zapping\" chest pain without radiation or associated symptoms without recurrence. Left arm tingling while driving prior to arrival, resolved without recurrence. Troponin 7 but does have diffuse ST/T changes in the inferolateral leads. Warrants further observation and work up.  Lipid panel 10/23 shows total cholesterol 125, HDL 34, LDL 61, Trigs 150.   -cardiac monitoring without arrhythmia, troponin x2 negative and stress test completed which was negative for inducible ischemia. Remained chest pain free since admission without any intervention. Discharging home. Recommended PCP follow up in 10-14 days, if ongoing symptoms, recommend GI eval as outpatient.      Hypertension  -continue PTA amlodipine once reconciled     Crohn's " disease  S/p partial ileocolectomy in 2019.   -  PTA methotrexate resumed at discharge  - follows up with [facility]     Hypokalemia  K is 2.9, possibly due to Crohn's and loose stools.   -Replaced per protocol. Follow up labs on follow up with PCP    Consultations This Hospital Stay   None    Code Status   Full Code    Time Spent on this Encounter   I, Bin Wagner MD, personally saw the patient today and spent greater than 30 minutes discharging this patient.       Bin Wagner MD  [facility] EXTENDED RECOVERY AND SHORT STAY  [address] [address]  Phone: [phone]  ______________________________________________________________________    Physical Exam   Vital Signs: Temp: 98.8  F (37.1  C) Temp src: Oral BP: 132/89 Pulse: 72   Resp: 18 SpO2: 93 % O2 Device: None (Room air)    Weight: 315 lbs 0 oz    General: AAOx3, appears comfortable.  HEENT: PERRLA EOMI. Mucosa moist.   Lungs: Bilateral equal air entry. Clear to auscultation, normal work of breathing.   CVS: S1S2 regular, no tachycardia or murmur.   Abdomen: Soft, NT, ND. BS heard.  MSK: No edema or deformities.  Neuro: AAOX3. CN 2-12 normal. Strength symmetrical.  Skin: No rash.          Primary Care Physician   [facility]    Discharge Orders      Reason for your hospital stay    Chest pain     Follow-up and recommended labs and tests     Follow up with primary care provider, [facility], within 10-14 days for hospital follow- up.     Activity    Your activity upon discharge: activity as tolerated     Diet    Follow this diet upon discharge: Orders Placed This Encounter      Regular Diet Adult       Significant Results and Procedures   Most Recent 3 CBC's:  Recent Labs   Lab Test 01/10/24  0348 08/14/20  1104 01/13/20  0949   WBC 7.6 6.1 6.2   HGB 14.7 14.7 13.7*   MCV 84 85 87    341 307     Most Recent 3 BMP's:  Recent Labs   Lab Test 01/11/24  0714 01/10/24  0724  01/10/24  0348 12/16/21  0758     --  139 141   POTASSIUM 3.6 3.6 2.9*  2.9* 3.7   CHLORIDE 103  --  102 109   CO2 31*  --  32* 28   BUN 10.7  --  8.4 10   CR 0.90  --  0.95 0.93   ANIONGAP 5*  --  5* 4   FAUSTINO 9.1  --  9.3 8.7   *  --  105* 92     Most Recent 2 LFT's:  Recent Labs   Lab Test 12/16/21  0758 08/14/20  1104 01/13/20  0950 04/22/19  1011   AST 19  --   --  11   ALT 40 38   < > 13   ALKPHOS 130  --   --  74   BILITOTAL 0.7  --   --  0.4    < > = values in this interval not displayed.     Most Recent 3 Troponin's:No lab results found.  Most Recent D-dimer:  Recent Labs   Lab Test 01/10/24  1748   DD 0.29   ,   Results for orders placed or performed during the hospital encounter of 01/10/24   XR Chest 2 Views    Narrative    EXAM: XR CHEST 2 VIEWS  LOCATION: [facility]  DATE: 1/10/2024    INDICATION: cp  COMPARISON: 11/21/2021      Impression    IMPRESSION: Negative chest.   NM MPI Treadmill (NUC CARD)     Value    Target     Exercise duration (min) 8    Exercise duration (sec) 40    Estimated workload 10.0    Angina Index 0    Baseline Systolic     Baseline Diastolic BP 98    Last Stress Systolic     Last Stress Diastolic     Baseline HR 83    Max HR  173    Max Predicted HR  96    Rate Pressure Product 32,524.0    Left Ventricular EF 70    Narrative      The nuclear stress test is negative for inducible myocardial ischemia   or infarction.    The left ventricular ejection fraction at stress is 70%.    The patient's exercise capacity is average.  No chest pain or EKG   changes.    There is no prior study for comparison.         Discharge Medications   Current Discharge Medication List        CONTINUE these medications which have NOT CHANGED    Details   amLODIPine (NORVASC) 10 MG tablet Take 10 mg by mouth daily      folic acid (FOLVITE) 1 MG tablet Take 1 mg by mouth daily      methotrexate 2.5 MG tablet Take 5 tablets (12.5 mg) by mouth  every 7 days      ustekinumab (STELARA) 90 MG/ML Inject 90 mg Subcutaneous Every 42 days           Allergies   No Known Allergies

## 2024-01-11 NOTE — PROGRESS NOTES
Top portion must ALWAYS be completed  PRIMARY Concern: Chest pain  SAFETY RISK Concerns (fall risk, behaviors, etc.): Green.      Tests/Procedures for NEXT shift: Stress test.   Consults? (Pending/following, signed-off?) None  Where is patient from? (Home, TCU, etc.): Home  Other Important info for NEXT shift: NPO all night.   Anticipated DC date & active delays: None.  _____________________________________________________________________________  SUMMARY NOTE:              (either paste note here OR complete the info below- RN to choose one- delete info below if not used)  Orientation/Cognitive: A/O X4  Observation Goals (Met/ Not Met): Not met.   Mobility Level/Assist Equipment: Independent.   Antibiotics & Plan (IV/po, length of tx left): None.   Pain Management: No pain this shift, no chest pain or discomfort.   Tele/VS/O2: NSR.   ABNL Lab/BG: All normal.   Diet: Regular diet but NPO overnight.   Bowel/Bladder: continent.   Skin Concerns: No skin concerns.   Drains/Devices: PIV SL.   Patient Stated Goal for Today: get stress test done.

## 2024-01-12 ENCOUNTER — TELEPHONE (OUTPATIENT)
Dept: DERMATOLOGY | Facility: CLINIC | Age: 39
End: 2024-01-12
Payer: COMMERCIAL

## 2024-01-12 ENCOUNTER — TRANSFERRED RECORDS (OUTPATIENT)
Dept: HEALTH INFORMATION MANAGEMENT | Facility: CLINIC | Age: 39
End: 2024-01-12
Payer: COMMERCIAL

## 2024-01-12 LAB
ALT SERPL-CCNC: 36 IU/L (ref 0–44)
AST SERPL-CCNC: 23 IU/L (ref 0–40)
CREATININE (EXTERNAL): 0.82 MG/DL (ref 0.76–1.27)
GFR ESTIMATED (EXTERNAL): 114 ML/MIN/1.73

## 2024-01-12 NOTE — TELEPHONE ENCOUNTER
Patient Contacted  and schedule the following:    Appointment type: Return  Provider: Vickie Bradley  Return date: 3/8/24  Specialty phone number: [phone]

## 2024-02-14 ENCOUNTER — TRANSFERRED RECORDS (OUTPATIENT)
Dept: HEALTH INFORMATION MANAGEMENT | Facility: CLINIC | Age: 39
End: 2024-02-14
Payer: COMMERCIAL

## 2024-02-22 ENCOUNTER — TRANSFERRED RECORDS (OUTPATIENT)
Dept: HEALTH INFORMATION MANAGEMENT | Facility: CLINIC | Age: 39
End: 2024-02-22
Payer: COMMERCIAL

## 2024-03-08 ENCOUNTER — OFFICE VISIT (OUTPATIENT)
Dept: DERMATOLOGY | Facility: CLINIC | Age: 39
End: 2024-03-08
Payer: COMMERCIAL

## 2024-03-08 DIAGNOSIS — L30.9 CHRONIC DERMATITIS OF HANDS: Primary | ICD-10-CM

## 2024-03-08 DIAGNOSIS — L28.0 LICHEN SIMPLEX CHRONICUS: ICD-10-CM

## 2024-03-08 PROCEDURE — 99214 OFFICE O/P EST MOD 30 MIN: CPT | Performed by: PHYSICIAN ASSISTANT

## 2024-03-08 RX ORDER — CLOBETASOL PROPIONATE 0.5 MG/G
OINTMENT TOPICAL 2 TIMES DAILY
Qty: 45 G | Refills: 4 | Status: SHIPPED | OUTPATIENT
Start: 2024-03-08 | End: 2024-03-22

## 2024-03-08 RX ORDER — PIMECROLIMUS 10 MG/G
CREAM TOPICAL 2 TIMES DAILY
Qty: 60 G | Refills: 4 | Status: SHIPPED | OUTPATIENT
Start: 2024-03-08

## 2024-03-08 RX ORDER — BETAMETHASONE DIPROPIONATE 0.5 MG/G
OINTMENT, AUGMENTED TOPICAL 2 TIMES DAILY
Qty: 50 G | Refills: 3 | Status: CANCELLED | OUTPATIENT
Start: 2024-03-08

## 2024-03-08 ASSESSMENT — PAIN SCALES - GENERAL: PAINLEVEL: NO PAIN (0)

## 2024-03-08 NOTE — PATIENT INSTRUCTIONS
"Eczema Action Plan    Name: Jose PEÑA Zuniga Provider: JONATHAN KWONG Date: 3/8/2024    Step 1:  Eczema Flare (Eczema is out of control and not responding to maintenance care)     Continue below procedures  Also, use prescribed steroid two times a day for up to two weeks per month. Apply to red and itchy areas. Put the steroid on the skin first before other creams.   Body: clobetasol 0.05% cream twice daily for 2 weeks as needed     Moisturize your whole body two times a day with a suggested cream.    Step 2: Eczema Under Control (Skin is soft and flexible, not red or itchy)  For maintenance: pimecrolimus 0.1% ointment twice daily on weekdays, clobetasol 0.05% cream twice daily on weekends    Moisturize the whole body at least two times a day.  Watch for signs that the skin is turning red, itchy, or dry.  Use a cream in a jar from the list below.   Suggested creams:  CeraVe Cream, Cetaphil Cream, Vanicream, Aquaphor, Vaseline  Use a gentle cleanser/soap in the bath/shower such as dove sensitive cleanser or Cetaphil cleanser only to the armpits and groin areas, or where you are visibly dirty. All other areas should get washed with water only. Do not scrub. After bathing pat the skin dry with a towel instead of rubbing dry. Apply your moisturizer while you're still slightly damp to lock in some of the moisture.     Wet Wraps  If your rash is very itchy or getting out of control you can also try applying what we call \"wet wraps\". Apply your moisturizers/medications to involved areas of the skin. Wet cotton clothing/ cloth with warm water and wring out excess water. Cover the involved area of the body with the clothing/ cloth. After 20-40 minutes, remove the clothing/cloth and rub in excess moisturizer/medication.       About Dry Skin    What is dry skin?  Common skin problem  Can be worse during the winter   Affects all ages  Occurs in people with or without other skin problems    What does it look like?  Fine lines in " the skin become more visible   Rough feeling skin   Flaky skin  Most common on the arms and legs  Skin can become cracked, especially on the hands and feet    What are some problems caused by dry skin?   Itching  Rubbing or scratching can cause thickened, rough skin patches  Cracks in skin can be painful  Red, itchy, scaly skin (called eczema) can occur  Yellow crusting or pus could be signs of an infection    What causes dry skin?  A lack of water in the top layer of the skin  Too much soapy water,  hot water, or harsh chemicals  Aging and sun damage    How do I treat dry skin?  Shower or bathe daily for under ten minutes with lukewarm water and mild soap.  Pat yourself dry with a towel gently and leave your skin slightly damp.  Use moisturizing cream or ointment right away.  Avoid lotions.    What kind of mild soap should I be using?  Cetaphil , Dove , Neutrogena , Vanicream , or Oil of Olay     What should I stay away from?  Scented soaps   Bath oils  Exfoliants     What moisturizers should I be using?  Cetaphil Cream,CeraVe Cream, Vanicream, Aquaphor, or Vaseline   Always apply after showering or bathing.  Reapply throughout the day, if possible.  If dry skin affects your hands, always reapply after handwashing.    What else should I know?  Using a humidifier during winter months may help.  If dry skin gets worse or if eczema develops, a steroid cream may be needed.

## 2024-03-08 NOTE — LETTER
3/8/2024       RE: Jose Zuniga  [address]  [facility] [address]     Dear Colleague,    Thank you for referring your patient, Jose Zuniga, to the [facility] [place] at [facility]. Please see a copy of my visit note below.    [facility] Dermatology Note  Encounter Date: Mar 8, 2024  Office Visit     Reviewed patients past medical history and pertinent chart review prior to patients visit today.     Dermatology Problem List:  # Acute on chronic hand dermatitis  # Lichen simplex chronicus  - clobetasol 0.05% cream, pimecrolimus 0.1% cream  ____________________________________________    Assessment & Plan:     # Acute on chronic hand dermatitis  # Lichen simplex chronicus  - For flares: Start clobetasol 0.05% cream twice daily for 2 weeks. Discussed risk of skin atrophy with long term chronic use. Not for face, armpits or groin.   - For maintenance: Start pimecrolimus 0.1% cream twice daily on weekdays, clobetasol 0.05% cream twice daily on weekends.     - We discussed the importance of daily emollients. Options include Vanicream, CeraVe Cream, Cetaphil Cream, or Vaseline. Avoid hot water when bathing. Use non-scented soaps and detergents. Pat skin dry instead of vigorous rubbing.     - Wet dressings: Once daily apply a thick layer of medication/ moisturizer to affected areas. Wet mittens/ towels with warm water and wring out excess water. Apply the wet cloth to the skin over the medication/ moisturizer for 20- 60 minutes. Remove wet cloth and rub in excess medication/ moisturizer.        Return to clinic in 1 month if not fully resolved, sooner PRN for new or worsening condition      All risks, benefits and alternatives were discussed with patient.  Patient is in agreement and understands the assessment and plan.  All questions were answered.  Vickie Bradley PA-C  [facility]  Dermatology  _______________________________________    CC: Derm Problem (Area of concern on the right hand.  Dry, itchy, swells up, bumps appears)    HPI:  Mr. Jose Zuniga is a(n) 39 year old male who presents today as a return patient for follow up on his hand dermatitis. He notes ongoing struggles with cracking and itching of his hands, particularly the right dorsal hand. During winter months he applies augmented betamethasone ointment about 5 days per week during winter months. The rash has not cleared completely. He uses gentle moisturizers daily. He notes a history of childhood eczema, no psoriasis. No other rashes.     Physical Exam:  SKIN: Focused examination of bilateral hands was performed.  - Thin scale with superficial fissures of the bilateral dorsal hands and web spaces, (R>L).    - No other lesions of concern on areas examined.     Medications:  Current Outpatient Medications   Medication    amLODIPine (NORVASC) 10 MG tablet    folic acid (FOLVITE) 1 MG tablet    methotrexate 2.5 MG tablet    ustekinumab (STELARA) 90 MG/ML     No current facility-administered medications for this visit.      Past Medical History:   Patient Active Problem List   Diagnosis    Allergic Rhinitis    Localized Primary Osteoarthritis Of The Right Shoulder Glenohumeral Joint    Lower Back Pain    Neck Pain    Lumbar radiculopathy    Crohn's colitis (H)    Anemia    Osteoarthritis of lumbar spine    Osteoarthritis cervical spine    Family history of pulmonary embolism    Ankylosis of sacroiliac joint    Benign essential hypertension    Morbid obesity (H)    Cervical radiculopathy    Crohn's disease of both small and large intestine without complication (H)    Asthma    Hypokalemia    Chest pain, unspecified type     Past Medical History:   Diagnosis Date    Chronic back pain     Chronic neck pain     Crohn's disease (H)     Eczema     mild, as a child    Hypertension     NO ACTIVE PROBLEMS     Osteoarthritis     Regional  enteritis (Crohn's disease) (H)     Shingles        CC No referring provider defined for this encounter. on close of this encounter.

## 2024-03-08 NOTE — PROGRESS NOTES
[facility] Dermatology Note  Encounter Date: Mar 8, 2024  Office Visit     Reviewed patients past medical history and pertinent chart review prior to patients visit today.     Dermatology Problem List:  # Acute on chronic hand dermatitis  # Lichen simplex chronicus  - clobetasol 0.05% cream, pimecrolimus 0.1% cream  ____________________________________________    Assessment & Plan:     # Acute on chronic hand dermatitis  # Lichen simplex chronicus  - For flares: Start clobetasol 0.05% cream twice daily for 2 weeks. Discussed risk of skin atrophy with long term chronic use. Not for face, armpits or groin.   - For maintenance: Start pimecrolimus 0.1% cream twice daily on weekdays, clobetasol 0.05% cream twice daily on weekends.     - We discussed the importance of daily emollients. Options include Vanicream, CeraVe Cream, Cetaphil Cream, or Vaseline. Avoid hot water when bathing. Use non-scented soaps and detergents. Pat skin dry instead of vigorous rubbing.     - Wet dressings: Once daily apply a thick layer of medication/ moisturizer to affected areas. Wet mittens/ towels with warm water and wring out excess water. Apply the wet cloth to the skin over the medication/ moisturizer for 20- 60 minutes. Remove wet cloth and rub in excess medication/ moisturizer.        Return to clinic in 1 month if not fully resolved, sooner PRN for new or worsening condition      All risks, benefits and alternatives were discussed with patient.  Patient is in agreement and understands the assessment and plan.  All questions were answered.  Vickie Bradley PA-C  [facility] Dermatology  _______________________________________    CC: Derm Problem (Area of concern on the right hand.  Dry, itchy, swells up, bumps appears)    HPI:  Mr. Jose Zuniga is a(n) 39 year old male who presents today as a return patient for follow up on his hand dermatitis. He notes ongoing struggles with cracking and itching of his hands,  particularly the right dorsal hand. During winter months he applies augmented betamethasone ointment about 5 days per week during winter months. The rash has not cleared completely. He uses gentle moisturizers daily. He notes a history of childhood eczema, no psoriasis. No other rashes.     Physical Exam:  SKIN: Focused examination of bilateral hands was performed.  - Thin scale with superficial fissures of the bilateral dorsal hands and web spaces, (R>L).    - No other lesions of concern on areas examined.     Medications:  Current Outpatient Medications   Medication    amLODIPine (NORVASC) 10 MG tablet    folic acid (FOLVITE) 1 MG tablet    methotrexate 2.5 MG tablet    ustekinumab (STELARA) 90 MG/ML     No current facility-administered medications for this visit.      Past Medical History:   Patient Active Problem List   Diagnosis    Allergic Rhinitis    Localized Primary Osteoarthritis Of The Right Shoulder Glenohumeral Joint    Lower Back Pain    Neck Pain    Lumbar radiculopathy    Crohn's colitis (H)    Anemia    Osteoarthritis of lumbar spine    Osteoarthritis cervical spine    Family history of pulmonary embolism    Ankylosis of sacroiliac joint    Benign essential hypertension    Morbid obesity (H)    Cervical radiculopathy    Crohn's disease of both small and large intestine without complication (H)    Asthma    Hypokalemia    Chest pain, unspecified type     Past Medical History:   Diagnosis Date    Chronic back pain     Chronic neck pain     Crohn's disease (H)     Eczema     mild, as a child    Hypertension     NO ACTIVE PROBLEMS     Osteoarthritis     Regional enteritis (Crohn's disease) (H)     Shingles        CC No referring provider defined for this encounter. on close of this encounter.

## 2024-03-08 NOTE — NURSING NOTE
Dermatology Rooming Note    Jose Zuniga's goals for this visit include:   Chief Complaint   Patient presents with    Derm Problem     Area of concern on the right hand.  Dry, itchy, swells up, bumps appears     Saba Bryson, CMA

## 2024-03-16 ENCOUNTER — TELEPHONE (OUTPATIENT)
Dept: DERMATOLOGY | Facility: CLINIC | Age: 39
End: 2024-03-16
Payer: COMMERCIAL

## 2024-03-16 NOTE — TELEPHONE ENCOUNTER
Received fax from pharmacy requesting clarification for   pimecrolimus (ELIDEL) 1 % external cream . Pharmacy states they already dispensed this medication to patient on 3/11 and aren't sure what was needed. They state that nothing is currently needed at this time for patient to fill and pharmacy had no questions or concerns for clinic at this time. Closing encounter.    Lloyd Matta, EMT

## 2024-04-17 ENCOUNTER — TRANSFERRED RECORDS (OUTPATIENT)
Dept: HEALTH INFORMATION MANAGEMENT | Facility: CLINIC | Age: 39
End: 2024-04-17
Payer: COMMERCIAL

## 2024-04-17 LAB
ALT SERPL-CCNC: 41 IU/L (ref 0–44)
AST SERPL-CCNC: 28 IU/L (ref 0–40)
CREATININE (EXTERNAL): 0.9 MG/DL (ref 0.76–1.27)
GFR ESTIMATED (EXTERNAL): 110 ML/MIN/1.73
GLUCOSE (EXTERNAL): 90 MG/DL (ref 70–99)
POTASSIUM (EXTERNAL): 4 MMOL/L (ref 3.5–5.2)

## 2024-04-25 ENCOUNTER — TRANSFERRED RECORDS (OUTPATIENT)
Dept: HEALTH INFORMATION MANAGEMENT | Facility: CLINIC | Age: 39
End: 2024-04-25
Payer: COMMERCIAL

## 2024-06-08 ENCOUNTER — HEALTH MAINTENANCE LETTER (OUTPATIENT)
Age: 39
End: 2024-06-08

## 2024-08-21 NOTE — TELEPHONE ENCOUNTER
PROCEDURE NOTE  Date: 8/20/2024   Name: Damian Vargas  YOB: 1964    Procedures    12 lead EKG completed. Results handed to RN        Pt has scheduled lab appt at [facility] for 12/16.    Kandace Romano

## 2024-09-16 NOTE — PROGRESS NOTES
SUBJECTIVE:   Jose Zuniga is a 32 y.o. male [facility].  Patient is still stiff in his neck.  Patient rates his pain a 4/10 bilateral neck that radiates into both shoulders.      PROCEDURE:  32 y.o. male with occipital, cervical, and thoracic somatic dysfunctions.      PRE-PROCEDURE DIAGNOSIS: Neck pain and cervical facet syndrome      PROCEDURE PERFORMED: joint manipulations.      Brief Procedure: The patient understands the risks and benefits of spinal manipulations      Procedure:  The patient has pain, tenderness and spasm at cervical regions.  Bilateral occipital PS, C2, C2, C3, C5, T1, T2, T5, and T6 malrotated which are determined by static and motion palpations. Patient was placed on table supine and prone. The occipital, cervical, and thoracic were manipulated by drop table and hands.  Scalene stretches applied bilaterally.  The patient tolerated the manipulations, scalene stretch without any complications.    DIAGNOSIS:  Diagnoses and all orders for this visit:    Cervicalgia    Cervical facet joint syndrome    Segmental and somatic dysfunction of head region    Cervical segment dysfunction    Thoracic region somatic dysfunction      DISCUSSION/PLAN:  This is a 32 y.o. male with medical history significant of lumbar radiculopathy, Crohn's colitis, tobacco abuse, right shoulder arthritis who presents today for spinal manipulation.  Patient still has a lot of stiffness in his neck but his pain has been stable with spinal manipulation.  He does have some increased range of motion.  Patient may need more additional treatment due to his moderate stiffness in his neck.  3-4 regions were manipulated.     Plan:    Home instructions: Heat QID for 10-15 minutes and OTC NSAID prn.    Patient will follow up next week for spinal manipulations.    Frequency: 2x week for 1 more week        Marita Dumont DC, RAOUL, ADELSO   

## 2024-10-28 ENCOUNTER — TRANSFERRED RECORDS (OUTPATIENT)
Dept: HEALTH INFORMATION MANAGEMENT | Facility: CLINIC | Age: 39
End: 2024-10-28
Payer: COMMERCIAL

## 2025-04-16 ENCOUNTER — TRANSFERRED RECORDS (OUTPATIENT)
Dept: ADMINISTRATIVE | Facility: CLINIC | Age: 40
End: 2025-04-16
Payer: COMMERCIAL

## 2025-04-18 NOTE — PROCEDURES
2025        Jose Zuniga (Dave)   [address] [place] [address]      Jose Zuniga (Dave),  :  1985    I am writing to let you know the results of the tests that were done the other day.   Thank you for allowing [facility] the opportunity to take part in your healthcare.  At [facility] we strive to provide each patient with the finest gastroenterology care available.  We hope your experience was pleasant and informative.    No sign of any Crohn's disease.  Tiny cyst in the pancreas is low risk and can be monitored over time (in a few years).      Thank you.    Electronically signed by:  Emory Mendoza MD 2025 10:54 AM  Document generated by:  Emory Mendoza MD  2025  If your provider ordered multiple tests; the results may not become available at the same time.  If multiple test results are received within 14 days of one another, you may receive a duplicate.  cc:  Bernabe Izaguirre DO

## 2025-04-21 NOTE — PROGRESS NOTES
[facility] Daily Progress     Patient Name: Jose Zuniga  Date: 6/29/2017  Visit #: 7  PTA visit #:    Referral Diagnosis:   724.2 (ICD-9-CM) - M54.5 (ICD-10-CM) - Lumbalgia   724.4 (ICD-9-CM) - M54.16 (ICD-10-CM) - Lumbar radiculitis   722.10 (ICD-9-CM) - M51.26 (ICD-10-CM) - Lumbar disc herniation   729.1 (ICD-9-CM) - M79.1 (ICD-10-CM) - Myofascial pain   723.1 (ICD-9-CM) - M54.2 (ICD-10-CM) - Cervicalgia   723.4 (ICD-9-CM) - M54.12 (ICD-10-CM) - Cervical radiculitis   780.50 (ICD-9-CM) - G47.9 (ICD-10-CM) - Sleep disturbance   721.0 (ICD-9-CM) - M46.92 (ICD-10-CM) - Facet arthritis of cervical region     Referring provider: Rosa Fagan PA-C  Visit Diagnosis:     ICD-10-CM    1. Chronic bilateral low back pain without sciatica M54.5     G89.29    2. Lumbar radiculopathy M54.16    3. Neck pain M54.2    4. Cervical radiculitis M54.12    5. Decreased ROM of neck R29.898          Assessment:     Patient is benefitting from skilled physical therapy and is making steady progress toward functional goals.  Patient is appropriate to continue with skilled physical therapy intervention, as indicated by initial plan of care.     Pt showing improved mobility with MedX and rotary torso today. Pt also performing more appropriate repetitions with lumbar MedX due to fatigue. Pt continues to perform high repetitions with cervical MedX. Pt has been consistent with mobility exercise for HEP, but encouraged to begin strengthening exercises.     Goal Status:  Pt. will be independent with home exercise program in : 4 weeks;Progressing toward  Pt. will report decreased intensity, frequency of : Pain;in 4 weeks;Comment;Progressing toward  Comment:: 50%  [place] in [place] in 6 weeks: (In progress)  Pt will: be able to ride his bike without increase in pain in 6 weeks: (In progress)  Pt will: be able to play with his nephew without a flare in back pain in 6 weeks: (In progress)    Plan /  [FreeTextEntry1] : Reviewed on April 21, 2025.  EKG as noted above. Labs available to me from 8 1/14/2024.  Showing LDL cholesterol 141 HDL 81 triglycerides 58.  Total cholesterol 234. Primary care notes were also reviewed. "Patient Education:     Continue with initial plan of care.  Progress with home program as tolerated.     Next session: review HEP, continue with MedX DE (cervical and lumbar), rotary torso,, adjust MedX DE  possible MFR or cervical joint mobs for cervical spine, progress mobility exercises    Subjective:     Pain Ratin  Pt feels that he still feel \"locked up\" in his neck. He tried electrical stimulation today with chiropractic, but unsure if it changed anything. \"My pain is the best its been in a long time.\"    Objective:     Improved mobility with lumbar and cervical MedX today   More appropriate repetitions with lumbar MedX    Limited ROM with reach and roll bilaterally    Improving mobility with rotary torso as well    4 way neck not performed today due to time    Lumbar MedX Initial testin17 4-week Re-test   AROM (full=  0-72  lumbar) 0-51 deg    Max Extension Torque  203#    Average Extension Torque 146#    Flex: ext ratio (ideal 1.4:1) 3.29:1        Cervical MedX Initial testin17 4-week Re-test   AROM (full= 0-120  cervical)  deg    Max Extension Torque  174#    Flex: ext ratio (ideal 1.4:1) 2.72:1      Cervical ROM   Date: 17    *Indicate scale AROM AROM AROM   Cervical Flexion 30 deg 40 deg    Cervical Extension 20  Deg pain and sting 35 deg     Right Left Right Left Right Left   Cervical Sidebending 10 deg 10 deg       Cervical Rotation 28 deg muscle pull 30 deg muscle pull 45 deg 35 deg       Exercises:  Exercise #1: Cervical ROM: HEP  Comment #1: Cervical Rotation Snag: x5 B, HEP  Exercise #2: Chin Tuck: HEP  Comment #2: X 10  Exercise #3: LTR: HEP  Comment #3: X 10  Exercise #4: Piriformis Stretch: HEP  Comment #4: X 30 seconds  Exercise #5: Femoral Nerve Glide: HEP  Comment #5: Rotary torso: started L, 38#, 90 seconds  Exercise #6: Bridges  Comment #6: X 10   Exercise #7: TA [place]  Comment #7: X 30  Exercise #8: Cervical Isometrics: reviewed  Comment #8: X " 5  Exercise #9: 4 way neck: SH 5, x20 each direction 30#    Treatment Today     TREATMENT MINUTES COMMENTS   Evaluation     Self-care/ Home management     Manual therapy     Neuromuscular Re-education     Therapeutic Activity     Therapeutic Exercises 30 See exercise and MedX flowsheets. Added reach and roll to HEP.    Gait training     Modality__________________                Total 30    Blank areas are intentional and mean the treatment did not include these items.       Emory Gallegos, PT, DPT  6/29/2017

## 2025-05-20 NOTE — TELEPHONE ENCOUNTER
Pt came for his kidney US contrast study PIV 20 ga started R forearm first attempt without incident, pt tolerated well.  Once study was completed the PIV dc and bandaged without incident. Pt advised to take the dressing off in 30 mins.   Refilled per Rheum RN refill protocol

## 2025-06-15 ENCOUNTER — HEALTH MAINTENANCE LETTER (OUTPATIENT)
Age: 40
End: 2025-06-15